# Patient Record
Sex: MALE | Race: WHITE | NOT HISPANIC OR LATINO | Employment: OTHER | URBAN - METROPOLITAN AREA
[De-identification: names, ages, dates, MRNs, and addresses within clinical notes are randomized per-mention and may not be internally consistent; named-entity substitution may affect disease eponyms.]

---

## 2017-12-29 ENCOUNTER — TELEPHONE (OUTPATIENT)
Dept: CARDIOLOGY | Facility: MEDICAL CENTER | Age: 72
End: 2017-12-29

## 2017-12-29 NOTE — TELEPHONE ENCOUNTER
----- Message from Dasia Quiroga sent at 12/29/2017 10:16 AM PST -----  Regarding: concerns about recent testing he had  LA/Traci      Patient's wife Patti called and patient had recent testing for another doctor and calcified arteries was mentioned. Doctor told patient it could of been there from years ago prior to him having stents put in, but suggested he get in touch with Dr. Waterman. Patti can be reached at [phone].    ========================================================================    S/w pt's wife (Patti), per Patti pt was just in his doctor today and they have the print out of the PET scan of the body that pt had done recently because of enlarged lymph node and it says on the heart section that pt have severe coronary artery calcification so that concerns them, extensive education provided to wife regarding coronary calcification and heart attack, wife reports pt denies any CP, increasing SOB or any cardiac symptoms at this time, informed wife that since pt has not seen in our office for more than a year, it would be a good idea to set up a F[facility] w/ Dr Waterman, wife agreed, discussed ED precautions w/ wife also, wife verbalizes understanding, wife transferred to scheduling to help set up FV.     FYI to Dr Waterman

## 2017-12-29 NOTE — TELEPHONE ENCOUNTER
Abida Waterman M.D.  Traci Clements R.N.   Caller: Unspecified (Today, 10:57 AM)             Thank you!   Very very common to have Ca++ in your seventies - no reccs unless he has sxs, like you said

## 2018-03-02 ENCOUNTER — TELEPHONE (OUTPATIENT)
Dept: CARDIOLOGY | Facility: MEDICAL CENTER | Age: 73
End: 2018-03-02

## 2018-03-02 ENCOUNTER — OFFICE VISIT (OUTPATIENT)
Dept: CARDIOLOGY | Facility: MEDICAL CENTER | Age: 73
End: 2018-03-02
Payer: MEDICARE

## 2018-03-02 VITALS
OXYGEN SATURATION: 95 % | BODY MASS INDEX: 34.36 KG/M2 | HEIGHT: 69 IN | HEART RATE: 100 BPM | SYSTOLIC BLOOD PRESSURE: 120 MMHG | DIASTOLIC BLOOD PRESSURE: 80 MMHG | WEIGHT: 232 LBS

## 2018-03-02 DIAGNOSIS — Z95.5 H/O RIGHT CORONARY ARTERY STENT PLACEMENT: Chronic | ICD-10-CM

## 2018-03-02 DIAGNOSIS — I25.10 CORONARY ARTERY DISEASE DUE TO LIPID RICH PLAQUE: ICD-10-CM

## 2018-03-02 DIAGNOSIS — I25.83 CORONARY ARTERY DISEASE DUE TO LIPID RICH PLAQUE: ICD-10-CM

## 2018-03-02 DIAGNOSIS — Z78.9 STATIN INTOLERANCE: ICD-10-CM

## 2018-03-02 DIAGNOSIS — I70.213 ATHEROSCLEROSIS OF NATIVE ARTERY OF BOTH LOWER EXTREMITIES WITH INTERMITTENT CLAUDICATION (HCC): Primary | ICD-10-CM

## 2018-03-02 PROCEDURE — 99214 OFFICE O/P EST MOD 30 MIN: CPT | Performed by: INTERNAL MEDICINE

## 2018-03-02 RX ORDER — HYDROCODONE BITARTRATE AND ACETAMINOPHEN 7.5; 325 MG/1; MG/1
1-2 TABLET ORAL EVERY 6 HOURS PRN
COMMUNITY
End: 2019-01-25

## 2018-03-02 ASSESSMENT — ENCOUNTER SYMPTOMS
PHOTOPHOBIA: 0
EYE PAIN: 0
COUGH: 0
DOUBLE VISION: 0
INSOMNIA: 0
HEARTBURN: 0
BRUISES/BLEEDS EASILY: 0
PALPITATIONS: 0
DIZZINESS: 0
BLURRED VISION: 0
ABDOMINAL PAIN: 0
LOSS OF CONSCIOUSNESS: 0
MYALGIAS: 0
WEIGHT LOSS: 0
SHORTNESS OF BREATH: 0
EYE DISCHARGE: 0

## 2018-03-02 NOTE — TELEPHONE ENCOUNTER
----- Message from Abida Waterman M.D. sent at 3/2/2018 10:48 AM PST -----  Can you please refer him back to the lipid clinic?  Diagnosis coronary disease with statin intolerance    ========================================================    Referral to Lipid Clinic initiated.

## 2018-03-02 NOTE — PROGRESS NOTES
"Subjective:   Brijesh Brown is a 72y.o. male who presents today in follow-up in regards to his coronary disease hypertension and hyperlipidemia had stenting to his mid LAD in 2004 for symptoms as well as subsequent stenting to the diagonal and right coronary artery more than 10 years ago    Doing well  Had a CAT scan of his chest to rule out cancer, wife was worried that he had calcium in his coronary arteries no symptoms, now on insulin for his diabetes but is able to walk without chest discomfort or breathlessness    Often does 2 miles a day but oftentimes, maybe once a week or so feels cramping in his thighs. He never took his statin. He has had trouble with more than 6 statins. We referred him to lipid clinic but he never followed up with fear that the medicines would be too expensive    Doing well no setbacks and enjoying his life      Past Medical History:   Diagnosis Date   • Atypical chest pain 4/23/2012   • Benign essential HTN 4/23/2012   • Depression 4/23/2012   • H/O right coronary artery stent placement 4/23/2012   • Myalgia 4/23/2012   • Other and unspecified hyperlipidemia 12/20/2013   • Overweight(278.02) 4/23/2012   • PVD (peripheral vascular disease) (CMS-[facility]) 4/23/2012     No past surgical history on file.  Family History   Problem Relation Age of Onset   • Lung Disease Mother    • Heart Disease Father      \"bad valve\"   • Diabetes Sister    • Heart Disease Sister      History   Smoking Status   • Former Smoker   • Packs/day: 1.00   • Years: 20.00   • Types: Cigarettes   • Quit date: 1/1/1984   Smokeless Tobacco   • Never Used     Comment: quit '84, 20 py history     Allergies   Allergen Reactions   • Statins [Hmg-Coa-R Inhibitors]      Caused muscle pain     Outpatient Encounter Prescriptions as of 3/2/2018   Medication Sig Dispense Refill   • Insulin Degludec (TRESIBA FLEXTOUCH SC) Inject  as instructed.     • HYDROcodone-acetaminophen (NORCO) 7.5-325 MG per tablet Take 1-2 Tabs by mouth " "every 6 hours as needed.     • glimepiride (AMARYL) 4 MG Tab Take 4 mg by mouth 2 times a day.     • metformin ER (GLUCOPHAGE XR) 500 MG TB24 Take 2 Tabs by mouth 2 times a day. 360 Tab 3   • Pitavastatin Calcium (LIVALO) 1 MG Tab Take 1 mg by mouth every day. (Patient not taking: Reported on 3/2/2018) 30 Tab 3   • vitamin D, Ergocalciferol, (DRISDOL) 24665 UNITS CAPS capsule Take 1 Cap by mouth every 7 days. (Patient not taking: Reported on 3/2/2018) 13 Cap 3   • aspirin EC (ECOTRIN) 81 MG TBEC Take 81 mg by mouth every day.     • Cholecalciferol (VITAMIN D) 2000 UNITS CAPS Take 2,000 Units by mouth every day.       No facility-administered encounter medications on file as of 3/2/2018.      Review of Systems   Constitutional: Negative for malaise/fatigue and weight loss.   Eyes: Negative for blurred vision, double vision, photophobia, pain and discharge.   Respiratory: Negative for cough and shortness of breath.    Cardiovascular: Negative for chest pain, palpitations and leg swelling.   Gastrointestinal: Negative for abdominal pain and heartburn.   Musculoskeletal: Negative for joint pain and myalgias.   Neurological: Negative for dizziness and loss of consciousness.   Endo/Heme/Allergies: Does not bruise/bleed easily.   Psychiatric/Behavioral: The patient does not have insomnia.    All other systems reviewed and are negative.       Objective:   /80   Pulse 100   Ht 1.74 m (5' 8.5\")   Wt 105.2 kg (232 lb)   SpO2 95%   BMI 34.76 kg/m²     Physical Exam   Constitutional: He is oriented to person, place, and time. He appears well-developed.   Overweight   HENT:   Head: Normocephalic and atraumatic.   Mouth/Throat: Mucous membranes are normal.   Eyes: EOM are normal. Pupils are equal, round, and reactive to light. No scleral icterus.   He has a visual field cut in his right eye from the midline down, it is black when he closes his left eye   Neck: No JVD present. No thyroid mass and no thyromegaly present. "   Cardiovascular: Normal rate, regular rhythm and intact distal pulses.    No murmur heard.  Normal posterior tibial pulses bilaterally   Pulmonary/Chest: Effort normal and breath sounds normal. He exhibits no tenderness.   Musculoskeletal: Normal range of motion. He exhibits no edema or tenderness.   Neurological: He is alert and oriented to person, place, and time. He has normal strength. He displays no tremor.   Skin: Skin is warm. No rash noted.   Psychiatric: He has a normal mood and affect. His behavior is normal.   Vitals reviewed.      Assessment:     1. Atherosclerosis of native artery of both lower extremities with intermittent claudication (CMS-[facility])  US-KWADWO MULTI LEVEL BILAT   2. Coronary artery disease due to lipid rich plaque     3. 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   2016  Again this strongly warrants statin or some other cholesterol reducing medication with his known coronary disease and significant calcification. I will refer him back to the cholesterol clinic. He is quite interested in doing this    Coronary disease  Talked about coronary calcium  Functional, plan PET scan to rule out ischemia if he has symptoms with exertion  Aspirin  Statin as above  Weight loss and diabetic control discussed at length    Cramping in the legs  Atypical for claudication  Exam reassuring, will check ABIs are discussed    RTC one year sooner based on the above. Laboratory data upcoming with his primary doctor

## 2018-03-12 ENCOUNTER — HOSPITAL ENCOUNTER (OUTPATIENT)
Dept: RADIOLOGY | Facility: MEDICAL CENTER | Age: 73
End: 2018-03-12
Attending: INTERNAL MEDICINE
Payer: MEDICARE

## 2018-03-12 DIAGNOSIS — I70.213 ATHEROSCLEROSIS OF NATIVE ARTERY OF BOTH LOWER EXTREMITIES WITH INTERMITTENT CLAUDICATION (HCC): ICD-10-CM

## 2018-03-12 PROCEDURE — 93922 UPR/L XTREMITY ART 2 LEVELS: CPT

## 2018-03-12 PROCEDURE — 93925 LOWER EXTREMITY STUDY: CPT

## 2018-03-13 ENCOUNTER — TELEPHONE (OUTPATIENT)
Dept: CARDIOLOGY | Facility: MEDICAL CENTER | Age: 73
End: 2018-03-13

## 2018-03-13 DIAGNOSIS — I73.9 PAD (PERIPHERAL ARTERY DISEASE) (HCC): ICD-10-CM

## 2018-03-13 NOTE — TELEPHONE ENCOUNTER
----- Message from Dasia Quiroga sent at 3/13/2018  2:06 PM PDT -----  Regarding: calling for test results  LA/Traci      Patient's wife Patti is calling for test results for lower extremity tests he had done yesterday, 03/12. She can be reached at [phone].      ----- Message -----   From: Abida Waterman M.D.   Sent: 3/12/2018   4:11 PM   To: Viviana Villarreal R.N.     Looks like he does have some significant PAD. Nothing to be immediately concerned or worried about. If his legs are cramping and bothering him, the right person for him to see would be a vascular surgeon, would recommend Dr. Arias's group, we be happy to set up      ==============================================================================    Called pt and wife (Patti), discussed LE US result and Dr Waterman recommendations, pt reports that he gets leg cramps cesilia in morning and his legs aches at times, pt and wife agreed to Vascular Referral.     [facility] initiated.

## 2018-03-23 ENCOUNTER — NON-PROVIDER VISIT (OUTPATIENT)
Dept: VASCULAR LAB | Facility: MEDICAL CENTER | Age: 73
End: 2018-03-23
Attending: INTERNAL MEDICINE
Payer: MEDICARE

## 2018-03-23 DIAGNOSIS — E78.5 HYPERLIPIDEMIA, UNSPECIFIED HYPERLIPIDEMIA TYPE: ICD-10-CM

## 2018-03-23 PROCEDURE — 99212 OFFICE O/P EST SF 10 MIN: CPT

## 2018-03-23 RX ORDER — TEMAZEPAM 15 MG/1
CAPSULE ORAL
Refills: 1 | COMMUNITY
Start: 2018-03-05 | End: 2019-01-25

## 2018-03-23 RX ORDER — INSULIN DEGLUDEC 200 U/ML
INJECTION, SOLUTION SUBCUTANEOUS
Refills: 0 | COMMUNITY
Start: 2018-03-05 | End: 2019-01-25

## 2018-03-23 RX ORDER — HYDROCODONE BITARTRATE AND ACETAMINOPHEN 10; 325 MG/1; MG/1
TABLET ORAL
Refills: 0 | COMMUNITY
Start: 2018-01-15 | End: 2019-01-25

## 2018-03-23 RX ORDER — IBUPROFEN 800 MG/1
TABLET ORAL
Refills: 0 | COMMUNITY
Start: 2018-01-15 | End: 2019-01-25

## 2018-03-23 NOTE — PROGRESS NOTES
Date of Service: 03/23/18    [facility] Brown has been referred for evaluation and management of dyslipidemia    Referral Source: Abida Waterman    [facility]  History of ASCVD: Yes, Details: Hx of Lower Extremity arterial disease PVD, Hx of CAD with right coronary stent placement.  Age at Initial Diagnosis:  58    Current Prescription Lipid Lowering Medications - including dose:   Statin: None  Non-Statin: None  Current Lipid Lowering and Related Supplements:   None  Any Current Side Effects Potentially Related to Lipid Lowering therapy?   No  Current Adherence to Lipid Lowering Therapies   Not on any lipid lowering therapy  Previously Attempted Interventions for Lipids - including outcome  Statin: Was on atorvastatin 40 mg for 12 years without complications then developed muscle pain/ weakness, patient also failed rosuvastatin and pitavasatin  Outcome: Atorvastatin was then stopped, he has since tried several different statins since that point  Non-Statin: Patient has had a Hx of trial of Zetia 10 mg and Welchol 2500 mg/day  Outcome: he did no tolerate those either  Any Previous History of Statin Intolerance?   Yes, Details: See above, Hx of several different trials including Lipitor, Crestor and pitavastatin  Baseline Lipids Prior to Treatment:   Shown here:Results for [facility] BROWN (MRN [id number]) as of 3/23/2018 10:56   Ref. Range 6/26/2015 08:54 10/14/2015 11:21   Cholesterol,Tot Latest Ref Range: 100 - 199 mg/dL 257 (H)    Triglycerides Latest Ref Range: 0 - 149 mg/dL 163 (H)    HDL Latest Ref Range: >39 mg/dL 42    LDL Latest Ref Range: 0 - 99 mg/dL 182 (H)    VLDL Cholesterol Calc Latest Ref Range: 5 - 40 mg/dL 33      Other Pertinent History: Patient with Hx of Type II Diabetes, HTN, Hx of cigarette smoking quit in 1984  PAST MEDICAL HISTORY:  has a past medical history of Atypical chest pain (4/23/2012); Benign essential HTN (4/23/2012); Depression (4/23/2012); H/O right coronary artery stent placement  (2012); Myalgia (2012); Other and unspecified hyperlipidemia (2013); Overweight(278.02) (2012); and PVD (peripheral vascular disease) (CMS-HCC) (2012).  PAST SURGICAL HISTORY:  has no past surgical history on file.  CURRENT MEDICATIONS:   Current Outpatient Prescriptions:   •  ONE TOUCH ULTRA TEST, USE TO TEST BLOOD SUGAR ONCE DAILY AS DIRECTED  •  HYDROcodone/acetaminophen, TK 1 T PO Q 4 TO 6 H PRN P  •  ibuprofen, TK 1 T PO Q 6 TO 8 H PRN P  •  TRESIBA FLEXTOUCH, INJECT 22 UNITS UNDER THE SKIN NIGHTLY  •  temazepam, TK 2 CS PO QHS PRF INSOMNIA  •  Insulin Degludec (TRESIBA FLEXTOUCH SC), Inject  as instructed., 3/2/2018  •  HYDROcodone-acetaminophen, 1-2 Tab, Oral, Q6HRS PRN, 3/2/2018  •  glimepiride, 4 mg, Oral, BID, 3/2/2018  •  metFORMIN ER, 1,000 mg, Oral, BID, 3/2/2018  •  vitamin D (Ergocalciferol), 50,000 Units, Oral, Q7 DAYS (Patient not taking: Reported on 3/2/2018), Not Taking at Unknown time  •  aspirin EC, 81 mg, Oral, DAILY  •  Vitamin D, 2,000 Units, Oral, DAILY  ALLERGIES: Statins [hmg-coa-r inhibitors]    SOCIAL HISTORY   History   Smoking Status   • Former Smoker   • Packs/day: 1.00   • Years: 20.00   • Types: Cigarettes   • Quit date: 1984   Smokeless Tobacco   • Never Used     Comment: quit '84, 20 py history     Change in weight: Stable  Exercise habits: no regular exercise program  Diet: common adult    ROS  Physical Exam    DATA REVIEW:  Most Recent Lipid Panel: 2015   TC: 257 T, LDL: 182, VLDL:33, HDL:42 ,  (Non HDL): 215    Other: NA    Recent Imaging Studies:    Recent imaging studies, including 2018 Right KWADWO 0.73, Left KWADWO 0.65, were available in EMR and reviewed with patient at today's visit. Bilateral. Moderate arterial insufficiency    ASSESSMENT AND PLAN  Patient Type, check all that apply:   Secondary Prevention  Established Atherosclerotic Cardiovascular Disease (ASCVD)  Yes, Details: Hx of CAD with right coronary stent placement,  PVD  Other Established (non-atherosclerotic) Vascular Disease, if Present:  None  Evidence of Heterozygous Familial Hypercholesterolemia (FH):   Possible  ACC/AHA Indication for Statin Therapy, michele all that apply:  Established ASCVD: Indication for High intensity statin   Calculated Risk for ASCVD, if applicable    N/A  Other Significant Risk Markers, if any, michele all that apply   Family history of premature ASCVD in first degree relative Father had a CAD with triple bypass at 54 y/o  National Lipid Association (NLA) Goal  LDL-C:   <70 mg/dL, Non HDL goal <100  Lifestyle Recommendations From Today’s Visit:    Eating Plan: Concentrate on  Low sat/Trans fat  Statin Therapy Recommendations from Today’s Visit:   Patient resistant to re-trial of statin at this point  Non-Statin Medications Recommendations from Today’s Visit:   None  Indication for PCSK9 Inhibitor, if applicable: YES  ASCVD with suboptimal control of LDL-C despite maximally tolerated statin.   Patient with Hx of ASCVD and Type II DM, per NLA guidelines he is very high risk, with the need of >20% reduction in LDL and Non HDL goals.  Supplements Recommended at this visit: Ordered Vit D levels will evaluate at that point   Recommendations for Other Cardiovascular Risk Factors, michele all that apply: Diabetes/Impaired Fasting Glucose- Patient is being managed by PCP last A1C ws 7.6 10/24/2015    Studies Ordered at Todays Visit: None  Blood Work Ordered At Today’s visit: Lipid panel and Vit D   Follow-Up: 13 days, patient will get new lipid panel ASAP and will return for instruction on PCSK9-I initiation.    Shakir George, PharmD     Agree with above  Patient fits ACC decision pathway and our clinical utilization criteria for use of PSCK9 Inhibitor    Michael J Bloch, MD  Certified Clinical Lipid Specialist  Medial Director, [facility]    CC:  Sheridan Jackman M.D.

## 2018-03-30 LAB
25(OH)D3+25(OH)D2 SERPL-MCNC: 24 NG/ML (ref 30–100)
ABN OPTION 3 977711: NORMAL
CHOLEST SERPL-MCNC: 219 MG/DL (ref 100–199)
HDLC SERPL-MCNC: 38 MG/DL
LABORATORY COMMENT REPORT: ABNORMAL
LDLC SERPL CALC-MCNC: 160 MG/DL (ref 0–99)
TRIGL SERPL-MCNC: 105 MG/DL (ref 0–149)
VLDLC SERPL CALC-MCNC: 21 MG/DL (ref 5–40)

## 2018-04-03 ENCOUNTER — HOSPITAL ENCOUNTER (OUTPATIENT)
Facility: MEDICAL CENTER | Age: 73
End: 2018-04-03
Attending: SURGERY | Admitting: SURGERY
Payer: MEDICARE

## 2018-04-06 ENCOUNTER — NON-PROVIDER VISIT (OUTPATIENT)
Dept: VASCULAR LAB | Facility: MEDICAL CENTER | Age: 73
End: 2018-04-06
Attending: INTERNAL MEDICINE
Payer: MEDICARE

## 2018-04-06 ENCOUNTER — TELEPHONE (OUTPATIENT)
Dept: VASCULAR LAB | Facility: MEDICAL CENTER | Age: 73
End: 2018-04-06

## 2018-04-06 DIAGNOSIS — E78.5 HYPERLIPIDEMIA, UNSPECIFIED HYPERLIPIDEMIA TYPE: ICD-10-CM

## 2018-04-06 PROCEDURE — 96372 THER/PROPH/DIAG INJ SC/IM: CPT

## 2018-04-06 NOTE — NON-PROVIDER
Patient came in today for Praluent training. Patient understands how the injection works. The first injection was given here in our office today, patient was comfortable enough to do it himself. Pharmacist also came in to give the patient his lab values. Updated patient on how the insurance works with this medication. Patient states that he is very concerned about the price. Let the patient know that once he has been approved, Praluent has a co-pay assistance program that he may qualify for. Once we get an approval from the insurance company we will try and qualify the patient for PASS. PA for the medication was just received this morning, I will call today and get the PA done.     Maria Guadalupe Velázquez, Med Ass't

## 2018-04-06 NOTE — TELEPHONE ENCOUNTER
Patients most recent Lipid panel reviewed, patient came in today for first administration of Praluent 150 mg. By the end of visit he was patient was comfortable enough to do it himself. 4  weeks of samples were given to patient today. [facility] 150 mg.     FU with pharmacotherapy clinic and  Lipid Panel in 6 weeks.    Shakir George, Pharm.D  Cc Dr Bloch

## 2018-04-10 ENCOUNTER — TELEPHONE (OUTPATIENT)
Dept: VASCULAR LAB | Facility: MEDICAL CENTER | Age: 73
End: 2018-04-10

## 2018-04-10 ENCOUNTER — TELEPHONE (OUTPATIENT)
Dept: CARDIOLOGY | Facility: MEDICAL CENTER | Age: 73
End: 2018-04-10

## 2018-04-10 ENCOUNTER — APPOINTMENT (OUTPATIENT)
Dept: ADMISSIONS | Facility: MEDICAL CENTER | Age: 73
End: 2018-04-10
Payer: MEDICARE

## 2018-04-10 DIAGNOSIS — I73.9 PAD (PERIPHERAL ARTERY DISEASE) (HCC): ICD-10-CM

## 2018-04-10 NOTE — TELEPHONE ENCOUNTER
----- Message from Ginny Cano sent at 4/10/2018 12:54 PM PDT -----  Regarding: Question about prescribing medication  LA/Traci    Patient's wife, Shelia, wants a call back at [phone]. She wants to find out if Dr Waterman would prescribe a medication called Cilostazol (Pletal) for her .    ================================================================    Called pt, pt reports he saw Dr Rodriguez for his PAD and was given two options; first is he can either loose weight and increase his activity as tolerated or he can have angioplasty done, per pt he opted on angioplasty first but then decided on the first one afterwards, then he read something about Pletal for PVD online, informed pt that he actually needs to call Dr Rodriguez office if this is something he would recommend for treatment of his PVD/PAD as he is the vascular specialist, pt verbalizes understanding, pt and wife would like to know though Dr Waterman recommendations if he is safe to take this medication considering his heart disease and other medications then they would call Dr Rodriguez to discussed.     To Dr Waterman for advice

## 2018-04-10 NOTE — TELEPHONE ENCOUNTER
Spoke with pt on the phone. He rc'd 1st praluent injections 4/6/18. He reports that evening he experienced SOB, chest pressure and numbness/lightheadedness in his face. He reports this for 2 more days (3 days total). He reports it has resolved at this point. Pt does have a hx of CAD. Discussed with pt not a common side effect of Praluent although he reports he read on the internet it is. Discussed with pt, concern of symptoms and possible coronary event. Recommended pt be evaluated in ER which he is refusing. He denies rash, tongue swelling, injection site reaction or any other allergic reaction symptoms.  He reports he will continue with Praluent q 2 weeks. Discussed with pt if symptoms return to seek immediate medical attention      Patsy Mcdonough, Pharm D

## 2018-04-11 RX ORDER — CILOSTAZOL 100 MG/1
100 TABLET ORAL EVERY 12 HOURS
Qty: 60 TAB | Refills: 6 | OUTPATIENT
Start: 2018-04-11 | End: 2019-01-25

## 2018-04-12 ENCOUNTER — TELEPHONE (OUTPATIENT)
Dept: CARDIOLOGY | Facility: MEDICAL CENTER | Age: 73
End: 2018-04-12

## 2018-04-12 ENCOUNTER — TELEPHONE (OUTPATIENT)
Dept: VASCULAR LAB | Facility: MEDICAL CENTER | Age: 73
End: 2018-04-12

## 2018-04-12 DIAGNOSIS — E78.5 HYPERLIPIDEMIA, UNSPECIFIED HYPERLIPIDEMIA TYPE: ICD-10-CM

## 2018-04-12 NOTE — TELEPHONE ENCOUNTER
Pt's wife called and is concerned re: new Rx for Pletal. She is wondering if he is to continue taking ASA 81 mg as well and worried about interactions. Pt is also doing Praluent injections and she is concerned about interactions with that as well. Discussed that Pharmacist is a good resource if concerned with drug interactions, but she would like Dr. Waterman's advice.    To Dr. Julienne Waterman to advise.

## 2018-04-13 NOTE — TELEPHONE ENCOUNTER
Called pt. S/w wife and pt, discussed Dr Waterman recommendations, pt and wife verbalizes understanding

## 2018-04-23 ENCOUNTER — TELEPHONE (OUTPATIENT)
Dept: VASCULAR LAB | Facility: MEDICAL CENTER | Age: 73
End: 2018-04-23

## 2018-04-23 NOTE — TELEPHONE ENCOUNTER
Received indication from silver script that praluent was approved.  MA to inform patient.    Michael J Bloch, MD  Certified Clinical Lipid Specialist  Medial Director, [facility]    Cc:

## 2018-04-24 ENCOUNTER — TELEPHONE (OUTPATIENT)
Dept: VASCULAR LAB | Facility: MEDICAL CENTER | Age: 73
End: 2018-04-24

## 2018-04-24 NOTE — TELEPHONE ENCOUNTER
Spoke with pt on the phone following up to see if he had side effects after second injection of Praluent. Pt reports similar adverse reaction of SOB and chest pressure for 2.5 days. Pt is reporting Praluent is $150 per month and he can not afford this. Pt wants to try Repatha as he reports he read it has less side effects... Pt is hoping to get on patient assistance program. Will get MA to contact pt to see if he qualifies for patient assistance for Repatha.     Patsy Mcdonough, Pharm D

## 2018-05-02 ENCOUNTER — ANTICOAGULATION MONITORING (OUTPATIENT)
Dept: VASCULAR LAB | Facility: MEDICAL CENTER | Age: 73
End: 2018-05-02

## 2018-05-02 ENCOUNTER — TELEPHONE (OUTPATIENT)
Dept: VASCULAR LAB | Facility: MEDICAL CENTER | Age: 73
End: 2018-05-02

## 2018-05-02 NOTE — TELEPHONE ENCOUNTER
PA forms completed for silver prescription and [facility] for repatha  Await coverage determination    Michael J Bloch, MD  Certified Clinical Lipid Specialist  Medial Director, [facility]

## 2018-05-02 NOTE — PROGRESS NOTES
Patient came in to receive samples of Repatha 140 mg.     LOT:[id number]  EXP: 6/20    Spoke with [facility] this morning, they will be faxing over the PA. Will make sure this is done in a timely manner and will keep the patient informed.     Maria Guadalupe Velázquez, Med Ass't  [facility]

## 2018-05-07 ENCOUNTER — TELEPHONE (OUTPATIENT)
Dept: VASCULAR LAB | Facility: MEDICAL CENTER | Age: 73
End: 2018-05-07

## 2018-05-07 NOTE — TELEPHONE ENCOUNTER
Received indication from formulary that repatha prior authorization was approved  Medical assistant to inform patient    Michael J Bloch, MD  Certified Clinical Lipid Specialist  Medial Director, [facility]

## 2018-05-14 ENCOUNTER — TELEPHONE (OUTPATIENT)
Dept: VASCULAR LAB | Facility: MEDICAL CENTER | Age: 73
End: 2018-05-14

## 2018-05-14 ENCOUNTER — ANTICOAGULATION MONITORING (OUTPATIENT)
Dept: VASCULAR LAB | Facility: MEDICAL CENTER | Age: 73
End: 2018-05-14

## 2018-05-14 NOTE — PROGRESS NOTES
Patient came in to fill out [employer] [facility] paperwork. Patient also received Repatha 140 mg sample.     LOT: [id number]  EXP: 6/20

## 2018-05-18 ENCOUNTER — APPOINTMENT (OUTPATIENT)
Dept: VASCULAR LAB | Facility: MEDICAL CENTER | Age: 73
End: 2018-05-18
Payer: MEDICARE

## 2018-05-24 ENCOUNTER — TELEPHONE (OUTPATIENT)
Dept: VASCULAR LAB | Facility: MEDICAL CENTER | Age: 73
End: 2018-05-24

## 2018-05-24 NOTE — TELEPHONE ENCOUNTER
Received indication from [employer] that repatha will be covered on safety [email or web address] [facility] to inform patient    Michael J Bloch, MD  Certified Clinical Lipid Specialist  Medial Director, [facility]

## 2018-06-21 ENCOUNTER — TELEPHONE (OUTPATIENT)
Dept: CARDIOLOGY | Facility: MEDICAL CENTER | Age: 73
End: 2018-06-21

## 2018-06-21 NOTE — TELEPHONE ENCOUNTER
----- Message from Ginny Cano sent at 6/21/2018  2:39 PM PDT -----  Regarding: Patient's wife wants call back  LA/Traci    Patient's wife, Patti, wants a call back at [phone]. She said that the call isn't an emergency but needs to speak to you about her . I tried to find out what the call is about and she said that she needs to speak to you.    ======================================================================    Called pt's wife (Patti), per wife last month pt had an episode that he was dizzy and felt some numbness on his face and almost faint, denies CP or SOB and denies further episodes since then, they did not check pt's BP and blood sugar during the episode. Advise wife to continue to monitor at this time and if pt have further episodes or increase episode to call our office or seek emergency services, wife verbalizes understanding    Pt's wife also would like me to advice her about her Aortic Regurgitation and whether she needs a prophylaxis abx, she said she currently don't have a cardiologist and sees Linda before, informed wife that we are unable to give recommendations about that at this time and recommended to contact her PCP if she does not have a cardiologist.     To Dr Waterman

## 2018-06-21 NOTE — TELEPHONE ENCOUNTER
Chart reviewed, agree with the patient's current treatment, recommend nothing new  Anyone in our group can see the patient's wife if needed.  Thanks 1 [email or web address]

## 2018-12-19 ENCOUNTER — TELEPHONE (OUTPATIENT)
Dept: VASCULAR LAB | Facility: MEDICAL CENTER | Age: 73
End: 2018-12-19

## 2018-12-19 NOTE — TELEPHONE ENCOUNTER
[facility]    LM for pt to call clinic and establish care again with lipid clinic.    Yoshi Strange, PharmD

## 2019-01-02 ENCOUNTER — TELEPHONE (OUTPATIENT)
Dept: VASCULAR LAB | Facility: MEDICAL CENTER | Age: 74
End: 2019-01-02

## 2019-01-02 NOTE — TELEPHONE ENCOUNTER
[facility]    Left VM to remind pt to schedule follow up with lipid clinic.    Yoshi Strange, PharmD

## 2019-01-10 ENCOUNTER — TELEPHONE (OUTPATIENT)
Dept: VASCULAR LAB | Facility: MEDICAL CENTER | Age: 74
End: 2019-01-10

## 2019-01-25 ENCOUNTER — OFFICE VISIT (OUTPATIENT)
Dept: CARDIOLOGY | Facility: MEDICAL CENTER | Age: 74
End: 2019-01-25
Payer: MEDICARE

## 2019-01-25 VITALS
WEIGHT: 232 LBS | HEART RATE: 100 BPM | BODY MASS INDEX: 34.36 KG/M2 | HEIGHT: 69 IN | SYSTOLIC BLOOD PRESSURE: 130 MMHG | OXYGEN SATURATION: 92 % | DIASTOLIC BLOOD PRESSURE: 84 MMHG

## 2019-01-25 DIAGNOSIS — E66.3 OVERWEIGHT: Chronic | ICD-10-CM

## 2019-01-25 DIAGNOSIS — G47.33 OBSTRUCTIVE SLEEP APNEA: ICD-10-CM

## 2019-01-25 DIAGNOSIS — I70.213 ATHEROSCLEROSIS OF NATIVE ARTERY OF BOTH LOWER EXTREMITIES WITH INTERMITTENT CLAUDICATION (HCC): ICD-10-CM

## 2019-01-25 DIAGNOSIS — Z78.9 STATIN INTOLERANCE: ICD-10-CM

## 2019-01-25 DIAGNOSIS — I25.10 CORONARY ARTERY DISEASE DUE TO LIPID RICH PLAQUE: ICD-10-CM

## 2019-01-25 DIAGNOSIS — I25.83 CORONARY ARTERY DISEASE DUE TO LIPID RICH PLAQUE: ICD-10-CM

## 2019-01-25 DIAGNOSIS — I73.9 PERIPHERAL ARTERIAL DISEASE (HCC): ICD-10-CM

## 2019-01-25 DIAGNOSIS — I10 BENIGN ESSENTIAL HTN: Chronic | ICD-10-CM

## 2019-01-25 DIAGNOSIS — Z87.891 PERSONAL HISTORY OF TOBACCO USE: ICD-10-CM

## 2019-01-25 DIAGNOSIS — E11.65 UNCONTROLLED TYPE 2 DIABETES MELLITUS WITH HYPERGLYCEMIA (HCC): ICD-10-CM

## 2019-01-25 DIAGNOSIS — E78.2 MIXED HYPERLIPIDEMIA: ICD-10-CM

## 2019-01-25 DIAGNOSIS — Z95.5 H/O RIGHT CORONARY ARTERY STENT PLACEMENT: Chronic | ICD-10-CM

## 2019-01-25 PROBLEM — I70.219 ATHEROSCLEROSIS OF NATIVE ARTERIES OF EXTREMITY WITH INTERMITTENT CLAUDICATION (HCC): Status: ACTIVE | Noted: 2019-01-25

## 2019-01-25 PROCEDURE — 99214 OFFICE O/P EST MOD 30 MIN: CPT | Performed by: NURSE PRACTITIONER

## 2019-01-25 RX ORDER — ZOLPIDEM TARTRATE 5 MG/1
TABLET ORAL
Refills: 3 | COMMUNITY
Start: 2019-01-20 | End: 2019-02-08 | Stop reason: CLARIF

## 2019-01-25 RX ORDER — PEN NEEDLE, DIABETIC 32GX 5/32"
NEEDLE, DISPOSABLE MISCELLANEOUS
Refills: 6 | COMMUNITY
Start: 2018-12-05 | End: 2019-02-08 | Stop reason: CLARIF

## 2019-01-25 RX ORDER — METFORMIN HYDROCHLORIDE 500 MG/1
TABLET, EXTENDED RELEASE ORAL
Refills: 0 | COMMUNITY
Start: 2018-11-28 | End: 2019-02-08 | Stop reason: CLARIF

## 2019-01-25 RX ORDER — HYDROCODONE BITARTRATE AND ACETAMINOPHEN 5; 325 MG/1; MG/1
TABLET ORAL
Refills: 0 | COMMUNITY
Start: 2019-01-21 | End: 2019-01-25

## 2019-01-25 ASSESSMENT — ENCOUNTER SYMPTOMS
MYALGIAS: 0
ABDOMINAL PAIN: 0
COUGH: 0
DIZZINESS: 0
ORTHOPNEA: 0
SHORTNESS OF BREATH: 0
PND: 0
CLAUDICATION: 0
PALPITATIONS: 0
FEVER: 0

## 2019-01-25 NOTE — PROGRESS NOTES
"Chief Complaint   Patient presents with   • Coronary Artery Disease     PP of [place]     Subjective:   Brijesh Brown is a 73 y.o. male who presents today for worsening exertional angina.    He is a patient of Dr. ALTAGRACIA Waterman in our office.    Hx of CAD with prior coronary stent placements X3 (unkonwn vessels), HLD, HTN, obesity, CHERIE (un-treated), and PVD.    He states that over the last year but mainly the last 5 months he has noticed exertional chest tightness, once he rests it goes away.    His wife is in the apt with him as well    He doesn't exercise and tries to eat healthy but remains overweight.    He has no claudication with his PVD.    He stopped taking ASA, repatha, and pletal due to side effects.    He has had no episodes of palpitations, dizziness/lightheadedness, shortness of breath, orthopnea, or peripheral edema.    Past Medical History:   Diagnosis Date   • Atypical chest pain 4/23/2012   • Benign essential HTN 4/23/2012   • Depression 4/23/2012   • H/O right coronary artery stent placement 4/23/2012   • Myalgia 4/23/2012   • Other and unspecified hyperlipidemia 12/20/2013   • Overweight(278.02) 4/23/2012   • PVD (peripheral vascular disease) ([facility]) 4/23/2012     No past surgical history on file.  Family History   Problem Relation Age of Onset   • Lung Disease Mother    • Heart Disease Father         \"bad valve\"   • Diabetes Sister    • Heart Disease Sister      Social History     Social History   • Marital status:      Spouse name: N/A   • Number of children: N/A   • Years of education: N/A     Occupational History   • Not on file.     Social History Main Topics   • Smoking status: Former Smoker     Packs/day: 1.00     Years: 20.00     Types: Cigarettes     Quit date: 1/1/1984   • Smokeless tobacco: Never Used      Comment: quit '84, 20 py history   • Alcohol use No   • Drug use: Unknown   • Sexual activity: Not on file      Comment:  48 years     Other Topics Concern   • Not on file "     Social History Narrative   • No narrative on file     Allergies   Allergen Reactions   • Statins [Hmg-Coa-R Inhibitors]      Caused muscle pain     Outpatient Encounter Prescriptions as of 1/25/2019   Medication Sig Dispense Refill   • metFORMIN ER (GLUCOPHAGE XR) 500 MG TABLET SR 24 HR TK 2 TS PO BID  0   • zolpidem (AMBIEN) 5 MG Tab TK 1 T PO QHS FOR 30 DAYS  3   • Insulin Degludec (TRESIBA FLEXTOUCH SC) Inject 25 Units as instructed.     • glimepiride (AMARYL) 4 MG Tab Take 8 mg by mouth 2 times a day.     • HYDROcodone-acetaminophen (NORCO) 5-325 MG Tab per tablet TK 1 T PO TID  0   • BD PEN NEEDLE BRE U/F USE TO INJECT INSULIN DAILY  6   • Evolocumab (REPATHA) 140 MG/ML Solution Prefilled Syringe Inject 140 mg as instructed every 14 days. 2 Syringe 5   • cilostazol (PLETAL) 100 MG Tab Take 1 Tab by mouth every 12 hours. 60 Tab 6   • ONE TOUCH ULTRA TEST strip USE TO TEST BLOOD SUGAR ONCE DAILY AS DIRECTED  11   • HYDROcodone/acetaminophen (NORCO)  MG Tab TK 1 T PO Q 4 TO 6 H PRN P  0   • ibuprofen (MOTRIN) 800 MG Tab TK 1 T PO Q 6 TO 8 H PRN P  0   • TRESIBA FLEXTOUCH 200 UNIT/ML Solution Pen-injector INJECT 22 UNITS UNDER THE SKIN NIGHTLY  0   • temazepam (RESTORIL) 15 MG Cap TK 2 CS PO QHS PRF INSOMNIA  1   • HYDROcodone-acetaminophen (NORCO) 7.5-325 MG per tablet Take 1-2 Tabs by mouth every 6 hours as needed.     • metformin ER (GLUCOPHAGE XR) 500 MG TB24 Take 2 Tabs by mouth 2 times a day. (Patient taking differently: Take 500 mg by mouth 2 times a day.) 360 Tab 3   • vitamin D, Ergocalciferol, (DRISDOL) 23698 UNITS CAPS capsule Take 1 Cap by mouth every 7 days. (Patient not taking: Reported on 3/2/2018) 13 Cap 3   • aspirin EC (ECOTRIN) 81 MG TBEC Take 81 mg by mouth every day.     • Cholecalciferol (VITAMIN D) 2000 UNITS CAPS Take 2,000 Units by mouth every day.       No facility-administered encounter medications on file as of 1/25/2019.      Review of Systems   Constitutional: Negative for  "fever and malaise/fatigue.   Respiratory: Negative for cough and shortness of breath.    Cardiovascular: Positive for chest pain. Negative for palpitations, orthopnea, claudication, leg swelling and PND.        Typical exertional angina   Gastrointestinal: Negative for abdominal pain.   Musculoskeletal: Negative for myalgias.   Neurological: Negative for dizziness.        Objective:   /84 (BP Location: Left arm, Patient Position: Sitting, BP Cuff Size: Adult)   Pulse 100   Ht 1.74 m (5' 8.5\")   Wt 105.2 kg (232 lb)   SpO2 92%   BMI 34.76 kg/m²     Physical Exam   Constitutional: He appears well-developed.   obese   HENT:   Head: Normocephalic and atraumatic.   Eyes: EOM are normal.   Neck: No JVD present.   Cardiovascular: Normal rate, regular rhythm, normal heart sounds and intact distal pulses.    Pulmonary/Chest: Effort normal and breath sounds normal.   Musculoskeletal: Normal range of motion. He exhibits no edema.   Skin: Skin is warm and dry.   Psychiatric: He has a normal mood and affect.   Nursing note and vitals reviewed.    Lab Results   Component Value Date/Time    CHOLSTRLTOT 219 (H) 03/29/2018 07:54 AM    CHOLSTRLTOT 253 10/01/2013     (H) 03/29/2018 07:54 AM     10/01/2013    HDL 38 (L) 03/29/2018 07:54 AM    HDL 44 10/01/2013    TRIGLYCERIDE 105 03/29/2018 07:54 AM    TRIGLYCERIDE 187 10/01/2013      LE ART/KWADWO US 3/12/18   Conclusions   Bilateral.    Moderate arterial insufficiency.     Bilateral   Doppler waveform of the common femoral artery is of high amplitude and    triphasic.    Doppler waveforms at the ankle are brisk and multiphasic.    Ankle-brachial index is moderately reduced.    Toe-Brachial Index Results:   Right: 0.32  Left: 0.28    TransthoracicEcho 2/4/16  CONCLUSIONS  Left ventricular ejection fraction is visually estimated to be 65%.  Mild left ventricular hypertrophy.  Grade I diastolic dysfunction.  No significant valve disease or flow abnormalities.   No " prior study is available for comparison.     Cardiac PET 6/4/2012  CONCLUSIONS/IMPRESSIONS:  1. Negative pharmacologic PET scan for ischemia or infarction.  2. Normal left ventricular systolic function with an ejection      fraction of 68% with stress.  3. No chest pain reported and no ischemic EKG changes seen with      pharmacologic stress testing.  4. Appropriate blood pressure and heart rate response to exercise.  5. No prior nuclear stress test available for comparison.    Assessment:     1. Benign essential HTN     2. H/O right coronary artery stent placement     3. Uncontrolled type 2 diabetes mellitus with hyperglycemia (HCC)     4. Coronary artery disease due to lipid rich plaque     5. Mixed hyperlipidemia     6. Statin intolerance       Medical Decision Making:  Today's Assessment / Status / Plan:     1. CAD with new persisting angina  -recommend cardiac PET for ischemic eval  -consider repeat cath if +  -if -, consider imdur 30 mg QD  -recommend re-starting ASA 81 mg QD  -unable to tolerate statins and PCKS9 inhibitors due to myalgias, continue to follow, consider zetia?  -follow up after PET scan to review plan of care    2. HTN  -good control on no medications  -follow in office and at home    3. HLD  -see above, no medications    4. DM  -managed per patient with metformin, tresiba, and glimepiride    5. CHERIE with obesity  -un-treated due to intolerance of mask  -discussed weight loss and management program, will think about it    6. Prior smoker  -cessation in '80's    7. PVD  -no claudication  -restart ASA, no cholesterol medication    FU in clinic in 1 months with SC post PET scan    Patient verbalizes understanding and agrees with the plan of care.     Collaborating MD: Keven FENG

## 2019-01-31 ENCOUNTER — TELEPHONE (OUTPATIENT)
Dept: VASCULAR LAB | Facility: MEDICAL CENTER | Age: 74
End: 2019-01-31

## 2019-01-31 NOTE — TELEPHONE ENCOUNTER
[facility]     Left VM for pt to call the clinic and establish care with lipid clinic for PCSK9 monitoring.    Yoshi Strange, PharmD

## 2019-02-06 ENCOUNTER — TELEPHONE (OUTPATIENT)
Dept: CARDIOLOGY | Facility: MEDICAL CENTER | Age: 74
End: 2019-02-06

## 2019-02-06 ENCOUNTER — HOSPITAL ENCOUNTER (OUTPATIENT)
Dept: RADIOLOGY | Facility: MEDICAL CENTER | Age: 74
DRG: 287 | End: 2019-02-06
Attending: NURSE PRACTITIONER
Payer: MEDICARE

## 2019-02-06 DIAGNOSIS — I25.83 CORONARY ARTERY DISEASE DUE TO LIPID RICH PLAQUE: ICD-10-CM

## 2019-02-06 DIAGNOSIS — I25.10 CORONARY ARTERY DISEASE DUE TO LIPID RICH PLAQUE: ICD-10-CM

## 2019-02-06 DIAGNOSIS — E78.2 MIXED HYPERLIPIDEMIA: ICD-10-CM

## 2019-02-06 PROCEDURE — A9555 RB82 RUBIDIUM: HCPCS

## 2019-02-06 PROCEDURE — 700111 HCHG RX REV CODE 636 W/ 250 OVERRIDE (IP)

## 2019-02-06 PROCEDURE — 93018 CV STRESS TEST I&R ONLY: CPT | Performed by: INTERNAL MEDICINE

## 2019-02-06 PROCEDURE — 78492 MYOCRD IMG PET MLT RST&STRS: CPT | Mod: 26 | Performed by: INTERNAL MEDICINE

## 2019-02-07 ENCOUNTER — TELEPHONE (OUTPATIENT)
Dept: CARDIOLOGY | Facility: MEDICAL CENTER | Age: 74
End: 2019-02-07

## 2019-02-07 NOTE — TELEPHONE ENCOUNTER
procedure advice   Received: Today   Message Contents   Lorena Brown R.N.   Phone Number: [phone]             SC/chiquis     Pt's wife Patti calling to ask if angioplasty and angiogram are the same and what does each one actually do for the patient.       Please call Patti, [phone]        Answered wife's questions. She also asked about procedure codes for insurance. Advised wife that the codes can be given tomorrow with visit if scheduled.

## 2019-02-07 NOTE — PROCEDURES
ORDERING PROVIDER:  TRACY Inman    AGE:  73.  GENDER:  Male.  HEIGHT:  68 inches.  WEIGHT:  232 pounds.    INDICATION:  Coronary atherosclerosis.    PROCEDURE:  The patient reviewed and signed the acknowledgement for testing   form.  The patient was in a fasting state and was properly prepared for   testing.  An intravenous line was inserted and a flush of normal saline   followed to insure line patency.     A transmission scan was acquired for attenuation correction using the internal   Germanium sources.  The patient was then administered 26.8 mCi of   Rubidium-82.  Approximately 90 seconds after the infusion, resting imagine   were obtained with ECG-gating.  Following the resting series, the patient   administered 60 mg of dipyridamole over four minutes.  The blood pressure,   heart rate and ECG were monitored and recorded.  After the dipyridamole   infusion was completed, another transmission scan for attenuation correction   was obtained.  The patient was then administered 26.7 mCi of Rubidium-82.    Approximately 90 seconds after the infusion, Peak stress images were obtained   with ECG-gating.     CLINICAL RESPONSE:  Resting blood pressure was 99/74 with a heart rate of 100.    Immediately post-dipyridamole infusion the blood pressure was 85/65 with a   heart rate of 92.  After a recovery period the blood pressure was 144/55 with   a heart rate of 88.     The patient experienced shortness of breath and chest pressure during testing.     Aminophylline 100 mg was administered following the scan.     ELECTROCARDIOGRAPHIC FINDINGS:  Resting EKG showed sinus rhythm.  No specific   changes with pharmacologic stress.     SCINTOGRAPHIC FINDINGS:  There is a moderate inferolateral wall motion defect.    This is completely reversible.  There are no other perfusion defects   appreciated.  The QC data for the scan was reviewed and was within acceptable   limits.      GATED WALL MOTION FINDINGS:  There is  normal global and segmental function.    Resting ejection fraction is 64%, this corrects to 72% with stress.  There is   no dilation appreciated of the ventricular myocardium with stress.      CONCLUSIONS AND IMPRESSIONS:   1.  Moderate ischemia in the inferior wall.  2.  Normal function with a resting ejection fraction of 64%.  3.  Ordering provider notified at time of reading.       ____________________________________     MD SCOOBY GRAVES / JAYLEN    DD:  02/06/2019 14:44:43  DT:  02/06/2019 16:39:22    D#:  [id number]  Job#:  [id number]

## 2019-02-07 NOTE — TELEPHONE ENCOUNTER
+ PET, needs [facility]   Received: Today   Message Contents   KAI Holt R.N.   Cc: Nani Waterman P.A.-C.             [place] called with + PET, needs repeat [facility] please for eval of coronaries.     No heavy exertional activities until cath. If chest pain worsens, utilize ER.     FU with cath and then arrange for 1-2 week HFU with myself or Nani post cath.     SC        S/w pt and explained the above information, recommendations and precautions. Pt has a follow up scheduled on the 8th, in 2 days. Instead of proceeding with scheduling procedure he would like to keep this appt and discuss things in more detail with APRN.

## 2019-02-08 ENCOUNTER — TELEPHONE (OUTPATIENT)
Dept: CARDIOLOGY | Facility: MEDICAL CENTER | Age: 74
End: 2019-02-08

## 2019-02-08 ENCOUNTER — APPOINTMENT (OUTPATIENT)
Dept: RADIOLOGY | Facility: MEDICAL CENTER | Age: 74
DRG: 287 | End: 2019-02-08
Attending: EMERGENCY MEDICINE
Payer: MEDICARE

## 2019-02-08 ENCOUNTER — HOSPITAL ENCOUNTER (INPATIENT)
Facility: MEDICAL CENTER | Age: 74
LOS: 3 days | DRG: 287 | End: 2019-02-11
Attending: EMERGENCY MEDICINE | Admitting: INTERNAL MEDICINE
Payer: MEDICARE

## 2019-02-08 ENCOUNTER — OFFICE VISIT (OUTPATIENT)
Dept: CARDIOLOGY | Facility: MEDICAL CENTER | Age: 74
End: 2019-02-08
Payer: MEDICARE

## 2019-02-08 VITALS
SYSTOLIC BLOOD PRESSURE: 138 MMHG | OXYGEN SATURATION: 93 % | HEART RATE: 92 BPM | HEIGHT: 69 IN | DIASTOLIC BLOOD PRESSURE: 74 MMHG | BODY MASS INDEX: 34.76 KG/M2

## 2019-02-08 DIAGNOSIS — I25.83 CORONARY ARTERY DISEASE DUE TO LIPID RICH PLAQUE: ICD-10-CM

## 2019-02-08 DIAGNOSIS — Z95.5 H/O RIGHT CORONARY ARTERY STENT PLACEMENT: Chronic | ICD-10-CM

## 2019-02-08 DIAGNOSIS — Z78.9 STATIN INTOLERANCE: ICD-10-CM

## 2019-02-08 DIAGNOSIS — G47.33 OBSTRUCTIVE SLEEP APNEA: ICD-10-CM

## 2019-02-08 DIAGNOSIS — I10 BENIGN ESSENTIAL HTN: Chronic | ICD-10-CM

## 2019-02-08 DIAGNOSIS — I25.10 CORONARY ARTERY DISEASE DUE TO LIPID RICH PLAQUE: ICD-10-CM

## 2019-02-08 DIAGNOSIS — Z87.891 PERSONAL HISTORY OF TOBACCO USE: ICD-10-CM

## 2019-02-08 DIAGNOSIS — I73.9 PERIPHERAL ARTERIAL DISEASE (HCC): ICD-10-CM

## 2019-02-08 DIAGNOSIS — R07.9 CHEST PAIN, UNSPECIFIED TYPE: ICD-10-CM

## 2019-02-08 DIAGNOSIS — E66.3 OVERWEIGHT: Chronic | ICD-10-CM

## 2019-02-08 DIAGNOSIS — E78.2 MIXED HYPERLIPIDEMIA: ICD-10-CM

## 2019-02-08 DIAGNOSIS — E11.65 UNCONTROLLED TYPE 2 DIABETES MELLITUS WITH HYPERGLYCEMIA (HCC): ICD-10-CM

## 2019-02-08 PROBLEM — I24.9 ACS (ACUTE CORONARY SYNDROME) (HCC): Status: ACTIVE | Noted: 2019-02-08

## 2019-02-08 LAB
ALBUMIN SERPL BCP-MCNC: 4.2 G/DL (ref 3.2–4.9)
ALBUMIN/GLOB SERPL: 1.1 G/DL
ALP SERPL-CCNC: 71 U/L (ref 30–99)
ALT SERPL-CCNC: 19 U/L (ref 2–50)
ANION GAP SERPL CALC-SCNC: 9 MMOL/L (ref 0–11.9)
APTT PPP: 27.6 SEC (ref 24.7–36)
AST SERPL-CCNC: 16 U/L (ref 12–45)
BASOPHILS # BLD AUTO: 0.3 % (ref 0–1.8)
BASOPHILS # BLD: 0.02 K/UL (ref 0–0.12)
BILIRUB SERPL-MCNC: 0.4 MG/DL (ref 0.1–1.5)
BNP SERPL-MCNC: 4 PG/ML (ref 0–100)
BUN SERPL-MCNC: 21 MG/DL (ref 8–22)
CALCIUM SERPL-MCNC: 9.3 MG/DL (ref 8.5–10.5)
CHLORIDE SERPL-SCNC: 102 MMOL/L (ref 96–112)
CHOLEST SERPL-MCNC: 245 MG/DL (ref 100–199)
CO2 SERPL-SCNC: 23 MMOL/L (ref 20–33)
CREAT SERPL-MCNC: 1.08 MG/DL (ref 0.5–1.4)
EKG IMPRESSION: NORMAL
EKG IMPRESSION: NORMAL
EOSINOPHIL # BLD AUTO: 0.19 K/UL (ref 0–0.51)
EOSINOPHIL NFR BLD: 2.6 % (ref 0–6.9)
ERYTHROCYTE [DISTWIDTH] IN BLOOD BY AUTOMATED COUNT: 45.5 FL (ref 35.9–50)
EST. AVERAGE GLUCOSE BLD GHB EST-MCNC: 212 MG/DL
GLOBULIN SER CALC-MCNC: 3.8 G/DL (ref 1.9–3.5)
GLUCOSE BLD-MCNC: 108 MG/DL (ref 65–99)
GLUCOSE BLD-MCNC: 142 MG/DL (ref 65–99)
GLUCOSE SERPL-MCNC: 222 MG/DL (ref 65–99)
HBA1C MFR BLD: 9 % (ref 0–5.6)
HCT VFR BLD AUTO: 44.7 % (ref 42–52)
HDLC SERPL-MCNC: 42 MG/DL
HGB BLD-MCNC: 15.4 G/DL (ref 14–18)
IMM GRANULOCYTES # BLD AUTO: 0.02 K/UL (ref 0–0.11)
IMM GRANULOCYTES NFR BLD AUTO: 0.3 % (ref 0–0.9)
INR PPP: 0.96 (ref 0.87–1.13)
LDLC SERPL CALC-MCNC: 179 MG/DL
LIPASE SERPL-CCNC: 13 U/L (ref 11–82)
LYMPHOCYTES # BLD AUTO: 2.08 K/UL (ref 1–4.8)
LYMPHOCYTES NFR BLD: 28.1 % (ref 22–41)
MCH RBC QN AUTO: 34.4 PG (ref 27–33)
MCHC RBC AUTO-ENTMCNC: 34.5 G/DL (ref 33.7–35.3)
MCV RBC AUTO: 99.8 FL (ref 81.4–97.8)
MONOCYTES # BLD AUTO: 0.77 K/UL (ref 0–0.85)
MONOCYTES NFR BLD AUTO: 10.4 % (ref 0–13.4)
NEUTROPHILS # BLD AUTO: 4.31 K/UL (ref 1.82–7.42)
NEUTROPHILS NFR BLD: 58.3 % (ref 44–72)
NRBC # BLD AUTO: 0 K/UL
NRBC BLD-RTO: 0 /100 WBC
PLATELET # BLD AUTO: 181 K/UL (ref 164–446)
PMV BLD AUTO: 11.2 FL (ref 9–12.9)
POTASSIUM SERPL-SCNC: 4.2 MMOL/L (ref 3.6–5.5)
PROT SERPL-MCNC: 8 G/DL (ref 6–8.2)
PROTHROMBIN TIME: 12.9 SEC (ref 12–14.6)
RBC # BLD AUTO: 4.48 M/UL (ref 4.7–6.1)
SODIUM SERPL-SCNC: 134 MMOL/L (ref 135–145)
TRIGL SERPL-MCNC: 121 MG/DL (ref 0–149)
TROPONIN I SERPL-MCNC: <0.01 NG/ML (ref 0–0.04)
WBC # BLD AUTO: 7.4 K/UL (ref 4.8–10.8)

## 2019-02-08 PROCEDURE — 700105 HCHG RX REV CODE 258: Performed by: INTERNAL MEDICINE

## 2019-02-08 PROCEDURE — 360979 HCHG DIAGNOSTIC CATH

## 2019-02-08 PROCEDURE — C1894 INTRO/SHEATH, NON-LASER: HCPCS

## 2019-02-08 PROCEDURE — 700102 HCHG RX REV CODE 250 W/ 637 OVERRIDE(OP): Performed by: STUDENT IN AN ORGANIZED HEALTH CARE EDUCATION/TRAINING PROGRAM

## 2019-02-08 PROCEDURE — 83036 HEMOGLOBIN GLYCOSYLATED A1C: CPT

## 2019-02-08 PROCEDURE — 85025 COMPLETE CBC W/AUTO DIFF WBC: CPT

## 2019-02-08 PROCEDURE — 99152 MOD SED SAME PHYS/QHP 5/>YRS: CPT

## 2019-02-08 PROCEDURE — 700102 HCHG RX REV CODE 250 W/ 637 OVERRIDE(OP): Performed by: INTERNAL MEDICINE

## 2019-02-08 PROCEDURE — 93458 L HRT ARTERY/VENTRICLE ANGIO: CPT

## 2019-02-08 PROCEDURE — 93005 ELECTROCARDIOGRAM TRACING: CPT | Performed by: EMERGENCY MEDICINE

## 2019-02-08 PROCEDURE — 93010 ELECTROCARDIOGRAM REPORT: CPT | Performed by: INTERNAL MEDICINE

## 2019-02-08 PROCEDURE — C1769 GUIDE WIRE: HCPCS

## 2019-02-08 PROCEDURE — 80061 LIPID PANEL: CPT

## 2019-02-08 PROCEDURE — 99153 MOD SED SAME PHYS/QHP EA: CPT

## 2019-02-08 PROCEDURE — A9270 NON-COVERED ITEM OR SERVICE: HCPCS | Performed by: INTERNAL MEDICINE

## 2019-02-08 PROCEDURE — 4A023N7 MEASUREMENT OF CARDIAC SAMPLING AND PRESSURE, LEFT HEART, PERCUTANEOUS APPROACH: ICD-10-PCS | Performed by: INTERNAL MEDICINE

## 2019-02-08 PROCEDURE — 85610 PROTHROMBIN TIME: CPT

## 2019-02-08 PROCEDURE — 84484 ASSAY OF TROPONIN QUANT: CPT

## 2019-02-08 PROCEDURE — 80053 COMPREHEN METABOLIC PANEL: CPT

## 2019-02-08 PROCEDURE — 700111 HCHG RX REV CODE 636 W/ 250 OVERRIDE (IP): Performed by: INTERNAL MEDICINE

## 2019-02-08 PROCEDURE — 93000 ELECTROCARDIOGRAM COMPLETE: CPT | Performed by: INTERNAL MEDICINE

## 2019-02-08 PROCEDURE — 99152 MOD SED SAME PHYS/QHP 5/>YRS: CPT | Performed by: INTERNAL MEDICINE

## 2019-02-08 PROCEDURE — B2111ZZ FLUOROSCOPY OF MULTIPLE CORONARY ARTERIES USING LOW OSMOLAR CONTRAST: ICD-10-PCS | Performed by: INTERNAL MEDICINE

## 2019-02-08 PROCEDURE — 700111 HCHG RX REV CODE 636 W/ 250 OVERRIDE (IP)

## 2019-02-08 PROCEDURE — 82962 GLUCOSE BLOOD TEST: CPT

## 2019-02-08 PROCEDURE — 93458 L HRT ARTERY/VENTRICLE ANGIO: CPT | Mod: 26 | Performed by: INTERNAL MEDICINE

## 2019-02-08 PROCEDURE — 700101 HCHG RX REV CODE 250

## 2019-02-08 PROCEDURE — B2151ZZ FLUOROSCOPY OF LEFT HEART USING LOW OSMOLAR CONTRAST: ICD-10-PCS | Performed by: INTERNAL MEDICINE

## 2019-02-08 PROCEDURE — 85730 THROMBOPLASTIN TIME PARTIAL: CPT

## 2019-02-08 PROCEDURE — 770020 HCHG ROOM/CARE - TELE (206)

## 2019-02-08 PROCEDURE — 36415 COLL VENOUS BLD VENIPUNCTURE: CPT

## 2019-02-08 PROCEDURE — 304952 HCHG R 2 PADS

## 2019-02-08 PROCEDURE — 83690 ASSAY OF LIPASE: CPT

## 2019-02-08 PROCEDURE — 99223 1ST HOSP IP/OBS HIGH 75: CPT | Mod: GC | Performed by: INTERNAL MEDICINE

## 2019-02-08 PROCEDURE — 99214 OFFICE O/P EST MOD 30 MIN: CPT | Performed by: NURSE PRACTITIONER

## 2019-02-08 PROCEDURE — 71045 X-RAY EXAM CHEST 1 VIEW: CPT

## 2019-02-08 PROCEDURE — 99221 1ST HOSP IP/OBS SF/LOW 40: CPT | Performed by: INTERNAL MEDICINE

## 2019-02-08 PROCEDURE — 83880 ASSAY OF NATRIURETIC PEPTIDE: CPT

## 2019-02-08 PROCEDURE — 93005 ELECTROCARDIOGRAM TRACING: CPT | Performed by: INTERNAL MEDICINE

## 2019-02-08 PROCEDURE — 99291 CRITICAL CARE FIRST HOUR: CPT

## 2019-02-08 PROCEDURE — 93005 ELECTROCARDIOGRAM TRACING: CPT

## 2019-02-08 PROCEDURE — 307093 HCHG TR BAND RADIAL

## 2019-02-08 RX ORDER — VERAPAMIL HYDROCHLORIDE 2.5 MG/ML
INJECTION, SOLUTION INTRAVENOUS
Status: COMPLETED
Start: 2019-02-08 | End: 2019-02-08

## 2019-02-08 RX ORDER — HEPARIN SODIUM,PORCINE 1000/ML
VIAL (ML) INJECTION
Status: COMPLETED
Start: 2019-02-08 | End: 2019-02-08

## 2019-02-08 RX ORDER — ZOLPIDEM TARTRATE 5 MG/1
5 TABLET ORAL NIGHTLY PRN
Status: DISCONTINUED | OUTPATIENT
Start: 2019-02-08 | End: 2019-02-11 | Stop reason: HOSPADM

## 2019-02-08 RX ORDER — SODIUM CHLORIDE 9 MG/ML
INJECTION, SOLUTION INTRAVENOUS CONTINUOUS
Status: DISCONTINUED | OUTPATIENT
Start: 2019-02-08 | End: 2019-02-08

## 2019-02-08 RX ORDER — ZOLPIDEM TARTRATE 5 MG/1
5 TABLET ORAL NIGHTLY PRN
COMMUNITY

## 2019-02-08 RX ORDER — ISOSORBIDE MONONITRATE 30 MG/1
30 TABLET, EXTENDED RELEASE ORAL EVERY MORNING
Qty: 30 TAB | Refills: 11 | Status: SHIPPED | OUTPATIENT
Start: 2019-02-08 | End: 2019-02-08 | Stop reason: CLARIF

## 2019-02-08 RX ORDER — HEPARIN SODIUM 1000 [USP'U]/ML
6000 INJECTION, SOLUTION INTRAVENOUS; SUBCUTANEOUS ONCE
Status: COMPLETED | OUTPATIENT
Start: 2019-02-08 | End: 2019-02-08

## 2019-02-08 RX ORDER — ASPIRIN 81 MG/1
324 TABLET, CHEWABLE ORAL ONCE
Status: COMPLETED | OUTPATIENT
Start: 2019-02-08 | End: 2019-02-08

## 2019-02-08 RX ORDER — MIDAZOLAM HYDROCHLORIDE 1 MG/ML
INJECTION INTRAMUSCULAR; INTRAVENOUS
Status: COMPLETED
Start: 2019-02-08 | End: 2019-02-08

## 2019-02-08 RX ORDER — POLYETHYLENE GLYCOL 3350 17 G/17G
1 POWDER, FOR SOLUTION ORAL
Status: DISCONTINUED | OUTPATIENT
Start: 2019-02-08 | End: 2019-02-11 | Stop reason: HOSPADM

## 2019-02-08 RX ORDER — SODIUM CHLORIDE 9 MG/ML
INJECTION, SOLUTION INTRAVENOUS CONTINUOUS
Status: DISCONTINUED | OUTPATIENT
Start: 2019-02-08 | End: 2019-02-09

## 2019-02-08 RX ORDER — ROSUVASTATIN CALCIUM 20 MG/1
10 TABLET, COATED ORAL EVERY EVENING
Status: DISCONTINUED | OUTPATIENT
Start: 2019-02-08 | End: 2019-02-11 | Stop reason: HOSPADM

## 2019-02-08 RX ORDER — ACETAMINOPHEN 325 MG/1
650 TABLET ORAL EVERY 6 HOURS PRN
Status: DISCONTINUED | OUTPATIENT
Start: 2019-02-08 | End: 2019-02-11 | Stop reason: HOSPADM

## 2019-02-08 RX ORDER — INSULIN GLARGINE 100 [IU]/ML
12 INJECTION, SOLUTION SUBCUTANEOUS EVERY EVENING
Status: DISCONTINUED | OUTPATIENT
Start: 2019-02-08 | End: 2019-02-08

## 2019-02-08 RX ORDER — HEPARIN SODIUM 1000 [USP'U]/ML
3200 INJECTION, SOLUTION INTRAVENOUS; SUBCUTANEOUS PRN
Status: DISCONTINUED | OUTPATIENT
Start: 2019-02-08 | End: 2019-02-11

## 2019-02-08 RX ORDER — BISACODYL 10 MG
10 SUPPOSITORY, RECTAL RECTAL
Status: DISCONTINUED | OUTPATIENT
Start: 2019-02-08 | End: 2019-02-11 | Stop reason: HOSPADM

## 2019-02-08 RX ORDER — DEXTROSE MONOHYDRATE 25 G/50ML
25 INJECTION, SOLUTION INTRAVENOUS
Status: DISCONTINUED | OUTPATIENT
Start: 2019-02-08 | End: 2019-02-09

## 2019-02-08 RX ORDER — INSULIN GLARGINE 100 [IU]/ML
20 INJECTION, SOLUTION SUBCUTANEOUS EVERY EVENING
Status: DISCONTINUED | OUTPATIENT
Start: 2019-02-08 | End: 2019-02-11 | Stop reason: HOSPADM

## 2019-02-08 RX ORDER — ASPIRIN 325 MG
325 TABLET ORAL DAILY
Status: DISCONTINUED | OUTPATIENT
Start: 2019-02-09 | End: 2019-02-09

## 2019-02-08 RX ORDER — LIDOCAINE HYDROCHLORIDE 20 MG/ML
INJECTION, SOLUTION INFILTRATION; PERINEURAL
Status: COMPLETED
Start: 2019-02-08 | End: 2019-02-08

## 2019-02-08 RX ORDER — AMOXICILLIN 250 MG
2 CAPSULE ORAL 2 TIMES DAILY
Status: DISCONTINUED | OUTPATIENT
Start: 2019-02-08 | End: 2019-02-11 | Stop reason: HOSPADM

## 2019-02-08 RX ADMIN — METOPROLOL TARTRATE 25 MG: 25 TABLET, FILM COATED ORAL at 17:40

## 2019-02-08 RX ADMIN — MIDAZOLAM HYDROCHLORIDE 1 MG: 1 INJECTION, SOLUTION INTRAMUSCULAR; INTRAVENOUS at 15:20

## 2019-02-08 RX ADMIN — HEPARIN SODIUM 6000 UNITS: 1000 INJECTION, SOLUTION INTRAVENOUS; SUBCUTANEOUS at 18:01

## 2019-02-08 RX ADMIN — MIDAZOLAM HYDROCHLORIDE 2 MG: 1 INJECTION, SOLUTION INTRAMUSCULAR; INTRAVENOUS at 14:38

## 2019-02-08 RX ADMIN — NITROGLYCERIN 10 ML: 20 INJECTION INTRAVENOUS at 14:38

## 2019-02-08 RX ADMIN — FENTANYL CITRATE 100 MCG: 50 INJECTION, SOLUTION INTRAMUSCULAR; INTRAVENOUS at 14:38

## 2019-02-08 RX ADMIN — SODIUM CHLORIDE: 9 INJECTION, SOLUTION INTRAVENOUS at 17:41

## 2019-02-08 RX ADMIN — ASPIRIN 81 MG 324 MG: 81 TABLET ORAL at 13:33

## 2019-02-08 RX ADMIN — SODIUM CHLORIDE: 9 INJECTION, SOLUTION INTRAVENOUS at 13:33

## 2019-02-08 RX ADMIN — HEPARIN SODIUM 1200 UNITS/HR: 5000 INJECTION, SOLUTION INTRAVENOUS at 18:02

## 2019-02-08 RX ADMIN — VERAPAMIL HYDROCHLORIDE 5 MG: 2.5 INJECTION, SOLUTION INTRAVENOUS at 14:38

## 2019-02-08 RX ADMIN — HEPARIN SODIUM: 1000 INJECTION, SOLUTION INTRAVENOUS; SUBCUTANEOUS at 14:38

## 2019-02-08 RX ADMIN — FENTANYL CITRATE 50 MCG: 50 INJECTION, SOLUTION INTRAMUSCULAR; INTRAVENOUS at 15:20

## 2019-02-08 RX ADMIN — LIDOCAINE HYDROCHLORIDE: 20 INJECTION, SOLUTION INFILTRATION; PERINEURAL at 14:38

## 2019-02-08 RX ADMIN — HEPARIN SODIUM: 200 INJECTION, SOLUTION INTRAVENOUS at 14:15

## 2019-02-08 ASSESSMENT — ENCOUNTER SYMPTOMS
FEVER: 0
ORTHOPNEA: 0
SHORTNESS OF BREATH: 0
BLURRED VISION: 1
COUGH: 1
BACK PAIN: 0
TREMORS: 0
SENSORY CHANGE: 0
TINGLING: 0
HEADACHES: 0
HEARTBURN: 0
DOUBLE VISION: 0
CLAUDICATION: 0
BACK PAIN: 1
HEMOPTYSIS: 0
SHORTNESS OF BREATH: 0
MYALGIAS: 0
NAUSEA: 1
MYALGIAS: 0
DIZZINESS: 0
BLOOD IN STOOL: 0
ORTHOPNEA: 0
CLAUDICATION: 0
PALPITATIONS: 0
PND: 0
PALPITATIONS: 0
FALLS: 0
NECK PAIN: 0
CHILLS: 0
VOMITING: 0
WEIGHT LOSS: 0
DIZZINESS: 0
SEIZURES: 0
PHOTOPHOBIA: 0
ABDOMINAL PAIN: 0
SPEECH CHANGE: 0
FEVER: 0
WHEEZING: 0
COUGH: 0
CONSTIPATION: 0
DIAPHORESIS: 0
DIARRHEA: 0
ABDOMINAL PAIN: 0
DEPRESSION: 0
FOCAL WEAKNESS: 0
NAUSEA: 1
SPUTUM PRODUCTION: 0

## 2019-02-08 ASSESSMENT — LIFESTYLE VARIABLES
SUBSTANCE_ABUSE: 0
ALCOHOL_USE: NO
EVER_SMOKED: YES

## 2019-02-08 ASSESSMENT — COGNITIVE AND FUNCTIONAL STATUS - GENERAL
MOVING FROM LYING ON BACK TO SITTING ON SIDE OF FLAT BED: A LITTLE
MOBILITY SCORE: 20
WALKING IN HOSPITAL ROOM: A LITTLE
CLIMB 3 TO 5 STEPS WITH RAILING: A LITTLE
SUGGESTED CMS G CODE MODIFIER DAILY ACTIVITY: CH
DAILY ACTIVITIY SCORE: 24
STANDING UP FROM CHAIR USING ARMS: A LITTLE
SUGGESTED CMS G CODE MODIFIER MOBILITY: CJ

## 2019-02-08 ASSESSMENT — PATIENT HEALTH QUESTIONNAIRE - PHQ9
2. FEELING DOWN, DEPRESSED, IRRITABLE, OR HOPELESS: NOT AT ALL
SUM OF ALL RESPONSES TO PHQ9 QUESTIONS 1 AND 2: 0
1. LITTLE INTEREST OR PLEASURE IN DOING THINGS: NOT AT ALL

## 2019-02-08 NOTE — ED TRIAGE NOTES
Chief Complaint   Patient presents with   • Chest Pain     Sent from cardiology office for ACS. Rates pain 2/10.  Chart up for ERP.

## 2019-02-08 NOTE — ED NOTES
Per Graciela BOURGEOIS from cath lab, she will come to ED to help transport pt to holding area.

## 2019-02-08 NOTE — LETTER
"     [facility]   [address]  [place] [address]  Phone: [phone]  Fax: [phone]              Brijesh Brown  1945    Encounter Date: 2/8/2019    KAI Holt          PROGRESS NOTE:  Chief Complaint   Patient presents with   • HTN (Controlled)     FV     Subjective:   Brijesh Brown is a 73 y.o. male who presents today for follow up on stress imaging.    He is a patient of Dr. Abida Waterman in our office.    Hx of CAD with prior LAD stenting X3 in '04, DM, obesity, HLD, PVD, and HTN.    He presents today with his wife. His wife is anxious about the results, he seems calm.    As of last night, his symptoms progressed to nausea, worsening chest tightness/back pain with radiation to his left jaw. EKG was obtained.    His EKG, recent stress imaging, worsening symptoms, and medical history were reviewed with Dr. Matthews, we both alongside the patient agree with ER evaluation and consideration for urgent cath today or tomorrow with worsening symptoms. Cardiology rounding team, cath lab, and ER were made aware of patient. He was wheeled over to the ER with nurse and family.    Past Medical History:   Diagnosis Date   • Benign essential HTN 4/23/2012   • CAD (coronary artery disease)     S/P NIKKI X 3 in '04   • Depression 4/23/2012   • Myalgia    • Other and unspecified hyperlipidemia 12/20/2013   • Overweight(278.02) 4/23/2012   • PVD (peripheral vascular disease) ([facility]) 4/23/2012     Past Surgical History:   Procedure Laterality Date   • CARDIAC CATH       Family History   Problem Relation Age of Onset   • Lung Disease Mother    • Heart Disease Father         \"bad valve\"   • Diabetes Sister    • Heart Disease Sister      Social History     Social History   • Marital status:      Spouse name: N/A   • Number of children: N/A   • Years of education: N/A     Occupational History   • Not on file.     Social History Main " Topics   • Smoking status: Former Smoker     Packs/day: 1.00     Years: 20.00     Types: Cigarettes     Quit date: 1/1/1984   • Smokeless tobacco: Never Used      Comment: quit '84, 20 py history   • Alcohol use No   • Drug use: Unknown   • Sexual activity: Not on file      Comment:  48 years     Other Topics Concern   • Not on file     Social History Narrative   • No narrative on file     Allergies   Allergen Reactions   • Statins [Hmg-Coa-R Inhibitors]      Caused muscle pain     Outpatient Encounter Prescriptions as of 2/8/2019   Medication Sig Dispense Refill   • [DISCONTINUED] isosorbide mononitrate SR (IMDUR) 30 MG TABLET SR 24 HR Take 1 Tab by mouth every morning. 30 Tab 11   • aspirin EC (ECOTRIN) 81 MG Tablet Delayed Response Take 1 Tab by mouth every day. 30 Tab 11   • [DISCONTINUED] metFORMIN ER (GLUCOPHAGE XR) 500 MG TABLET SR 24 HR TK 2 TS PO BID  0   • [DISCONTINUED] BD PEN NEEDLE BRE U/F USE TO INJECT INSULIN DAILY  6   • [DISCONTINUED] zolpidem (AMBIEN) 5 MG Tab TK 1 T PO QHS FOR 30 DAYS  3   • [DISCONTINUED] ONE TOUCH ULTRA TEST strip USE TO TEST BLOOD SUGAR ONCE DAILY AS DIRECTED  11   • Insulin Degludec (TRESIBA FLEXTOUCH) 200 UNIT/ML Solution Pen-injector Inject 26 Units as instructed every evening.     • glimepiride (AMARYL) 4 MG Tab Take 4 mg by mouth 2 times a day.       No facility-administered encounter medications on file as of 2/8/2019.      Review of Systems   Constitutional: Negative for fever and malaise/fatigue.   Respiratory: Negative for cough and shortness of breath.    Cardiovascular: Positive for chest pain. Negative for palpitations, orthopnea, claudication, leg swelling and PND.   Gastrointestinal: Positive for nausea. Negative for abdominal pain.   Musculoskeletal: Positive for back pain. Negative for myalgias.   Neurological: Negative for dizziness.        Objective:   /74 (BP Location: Left arm, Patient Position: Sitting, BP Cuff Size: Adult)   Pulse 92   Ht  "1.74 m (5' 8.5\")   SpO2 93%   BMI 34.76 kg/m²      Physical Exam   Constitutional: He appears well-developed.   Central obesity   HENT:   Head: Normocephalic and atraumatic.   Eyes: EOM are normal.   Neck: No JVD present.   Cardiovascular: Normal rate, regular rhythm, normal heart sounds and intact distal pulses.    Pulmonary/Chest: Effort normal and breath sounds normal.   Musculoskeletal: Normal range of motion. He exhibits no edema.   Skin: Skin is warm and dry.   Psychiatric: He has a normal mood and affect.   Nursing note and vitals reviewed.      Assessment:     1. Benign essential HTN     2. Coronary artery disease due to lipid rich plaque  EKG   3. H/O right coronary artery stent placement  EKG   4. Mixed hyperlipidemia     5. Obstructive sleep apnea     6. Overweight     7. Peripheral arterial disease (HCC)     8. Personal history of tobacco use     9. Statin intolerance     10. Uncontrolled type 2 diabetes mellitus with hyperglycemia (HCC)       Medical Decision Making:  Today's Assessment / Status / Plan:     1. CAD, prior stents X3 to LAD in '04  -last seen a few weeks ago with angina  -stress imaging was ordered and now + with moderate ischemia in inferior region  -patient presented to office with worsening symptoms of chest tightness, back pain, L neck pain, and nausea as of last night  -sent for ER eval and admission for [facility]  -rounding cardiology team aware of patient  -cont asa, unable to tolerate statins/PCSK9 inhibitors   -discuss management post cath    2. HTN  -moderate control  -no medications  -follow post cath    3. HLD  -unmanaged as patient is intolerant to statins and PCSK9 inhibitors, follow post cath    4. DM  -managed by PCP    5. Obesity  -needs weight loss regimen  -discuss at hospital follow up post cath    6. PAD  -no claudication  -cont asa, needs cholesterol management  -follow clinically    7. Prior smoker  -now cessation for >30 years  -20 pack years    FU in clinic in 2 weeks " with SC post cath    Patient verbalizes understanding and agrees with the plan of care.     Collaborating MD: Byron FENG        No Recipients

## 2019-02-08 NOTE — ED PROVIDER NOTES
ED Provider Note    CHIEF COMPLAINT  Chief Complaint   Patient presents with   • Chest Pain       HPI  Brijesh Brown is a 73 y.o. male here for evaluation of chest pain.  The patient states that he has been having intermittent chest heaviness associated with nausea and radiation to the left side of his neck, over the last few days.  He was sent in here by Dr. Waterman, after he had an abnormal stress test we believe done on February 6.  The patient has no vomiting, no shortness of breath, and no abdominal pain.  He has no fever, no chills.  Nothing alleviates or exacerbates his symptoms.  He does take some aspirin intermittently, but not consistently.  He describes his pain is an aching pain, but nonradiating.    PAST MEDICAL HISTORY   has a past medical history of Benign essential HTN (4/23/2012); CAD (coronary artery disease); Depression (4/23/2012); Diabetes ([facility]); Myalgia; Other and unspecified hyperlipidemia (12/20/2013); Overweight(278.02) (4/23/2012); and PVD (peripheral vascular disease) ([facility]) (4/23/2012).    SOCIAL HISTORY  Social History     Social History Main Topics   • Smoking status: Former Smoker     Packs/day: 1.00     Years: 20.00     Types: Cigarettes     Quit date: 1/1/1984   • Smokeless tobacco: Never Used      Comment: quit '84, 20 py history   • Alcohol use No   • Drug use: Unknown   • Sexual activity: Not on file      Comment:  48 years       Family History  No bleeding disorders.     SURGICAL HISTORY   has a past surgical history that includes cardiac cath.    CURRENT MEDICATIONS  Home Medications     Reviewed by Leighton Barr (Pharmacy Tech) on 02/08/19 at 1304  Med List Status: Complete   Medication Last Dose Status   aspirin EC (ECOTRIN) 81 MG Tablet Delayed Response 2/8/2019 Active   glimepiride (AMARYL) 4 MG Tab 2/8/2019 Active   Insulin Degludec (TRESIBA FLEXTOUCH) 200 UNIT/ML Solution Pen-injector 2/7/2019 Active   metFORMIN (GLUCOPHAGE) 500 MG Tab 2/8/2019 Active    zolpidem (AMBIEN) 5 MG Tab PRN Active                ALLERGIES  Allergies   Allergen Reactions   • Statins [Hmg-Coa-R Inhibitors]      Caused muscle pain       REVIEW OF SYSTEMS  See HPI for further details. Review of systems as above, otherwise all other systems are negative.     PHYSICAL EXAM  Constitutional: Well developed, well nourished. No acute distress.  HEENT: Normocephalic, atraumatic. Posterior pharynx clear and moist.  Eyes:  EOMI. Normal sclera.  Neck: Supple, Full range of motion, nontender.  Chest/Pulmonary: clear to ausculation. Symmetrical expansion.   Cardio: Regular rate and rhythm with no murmur.   Abdomen: Soft, nontender. No peritoneal signs. No guarding. No palpable masses  Musculoskeletal: No deformity, no edema, neurovascular intact.   Neuro: Clear speech, appropriate, cooperative, cranial nerves II-XII grossly intact.  Psych: Normal mood and affect    Results for orders placed or performed during the hospital encounter of 02/08/19   Troponin   Result Value Ref Range    Troponin I <0.01 0.00 - 0.04 ng/mL   Btype Natriuretic Peptide   Result Value Ref Range    B Natriuretic Peptide 4 0 - 100 pg/mL   CBC with Differential   Result Value Ref Range    WBC 7.4 4.8 - 10.8 K/uL    RBC 4.48 (L) 4.70 - 6.10 M/uL    Hemoglobin 15.4 14.0 - 18.0 g/dL    Hematocrit 44.7 42.0 - 52.0 %    MCV 99.8 (H) 81.4 - 97.8 fL    MCH 34.4 (H) 27.0 - 33.0 pg    MCHC 34.5 33.7 - 35.3 g/dL    RDW 45.5 35.9 - 50.0 fL    Platelet Count 181 164 - 446 K/uL    MPV 11.2 9.0 - 12.9 fL    Neutrophils-Polys 58.30 44.00 - 72.00 %    Lymphocytes 28.10 22.00 - 41.00 %    Monocytes 10.40 0.00 - 13.40 %    Eosinophils 2.60 0.00 - 6.90 %    Basophils 0.30 0.00 - 1.80 %    Immature Granulocytes 0.30 0.00 - 0.90 %    Nucleated RBC 0.00 /100 WBC    Neutrophils (Absolute) 4.31 1.82 - 7.42 K/uL    Lymphs (Absolute) 2.08 1.00 - 4.80 K/uL    Monos (Absolute) 0.77 0.00 - 0.85 K/uL    Eos (Absolute) 0.19 0.00 - 0.51 K/uL    Baso (Absolute)  0.02 0.00 - 0.12 K/uL    Immature Granulocytes (abs) 0.02 0.00 - 0.11 K/uL    NRBC (Absolute) 0.00 K/uL   Complete Metabolic Panel (CMP)   Result Value Ref Range    Sodium 134 (L) 135 - 145 mmol/L    Potassium 4.2 3.6 - 5.5 mmol/L    Chloride 102 96 - 112 mmol/L    Co2 23 20 - 33 mmol/L    Anion Gap 9.0 0.0 - 11.9    Glucose 222 (H) 65 - 99 mg/dL    Bun 21 8 - 22 mg/dL    Creatinine 1.08 0.50 - 1.40 mg/dL    Calcium 9.3 8.5 - 10.5 mg/dL    AST(SGOT) 16 12 - 45 U/L    ALT(SGPT) 19 2 - 50 U/L    Alkaline Phosphatase 71 30 - 99 U/L    Total Bilirubin 0.4 0.1 - 1.5 mg/dL    Albumin 4.2 3.2 - 4.9 g/dL    Total Protein 8.0 6.0 - 8.2 g/dL    Globulin 3.8 (H) 1.9 - 3.5 g/dL    A-G Ratio 1.1 g/dL   Prothrombin Time   Result Value Ref Range    PT 12.9 12.0 - 14.6 sec    INR 0.96 0.87 - 1.13   APTT   Result Value Ref Range    APTT 27.6 24.7 - 36.0 sec   Lipase   Result Value Ref Range    Lipase 13 11 - 82 U/L   ESTIMATED GFR   Result Value Ref Range    GFR If African American >60 >60 mL/min/1.73 m 2    GFR If Non African American >60 >60 mL/min/1.73 m 2   EKG   Result Value Ref Range    Report       [facility] Emergency Dept.    Test Date:  2019  Pt Name:    [facility] CONKLIN                  Department: ER  MRN:        [id number]                      Room:       RD 05  Gender:     Male                         Technician: [id number]  :        1945                   Requested By:ER TRIAGE PROTOCOL  Order #:    [id number]                    Reading MD:    Measurements  Intervals                                Axis  Rate:       84                           P:          32  SD:         168                          QRS:        -6  QRSD:       92                           T:          23  QT:         372  QTc:        440    Interpretive Statements  SINUS RHYTHM  RSR' IN V1 OR V2, PROBABLY NORMAL VARIANT  Compared to ECG 2019 10:44:46  RSR' in V1 or V2 now present       DX-CHEST-LIMITED (1 VIEW)   Final Result       Bilateral lung base atelectasis.            PROCEDURES     MEDICAL RECORD  I have reviewed patient's medical record and pertinent results are listed above.    COURSE & MEDICAL DECISION MAKING  I have reviewed any medical record information, laboratory studies and radiographic results as noted above.    Dr. Galan, has come down to see the patient, and states that he is going to send the patient to the Cath Lab.  At this time the patient has mild chest discomfort, but described as only as a slight heaviness.  He has no vomiting, and no current nausea.    I have been into speak to the family regarding her lab work, EKG, and the tentative plan.  The patient will be admitted to [facility], and they are seeing the patient now as well.    CRITICAL CARE  The very real possibility of a deterioration of this patient's condition required the highest level of my preparedness for sudden, emergent intervention.  I provided critical care services, which included medication orders, frequent reevaluations of the patient's condition and response to treatment, ordering and reviewing test results, and discussing the case with various consultants.  The critical care time associated with the care of the patient was 35 minutes. Review chart for interventions. This time is exclusive of any other billable procedures.       FINAL IMPRESSION  Chest pain  Critical care 35 minutes.       Electronically signed by: Mando Stevenson, 2/8/2019 1:37 PM

## 2019-02-08 NOTE — PROGRESS NOTES
"Chief Complaint   Patient presents with   • HTN (Controlled)     FV     Subjective:   Brijesh Brown is a 73 y.o. male who presents today for follow up on stress imaging.    He is a patient of Dr. Abida Waterman in our office.    Hx of CAD with prior LAD stenting X3 in '04, DM, obesity, HLD, PVD, and HTN.    He presents today with his wife. His wife is anxious about the results, he seems calm.    As of last night, his symptoms progressed to nausea, worsening chest tightness/back pain with radiation to his left jaw. EKG was obtained.    His EKG, recent stress imaging, worsening symptoms, and medical history were reviewed with Dr. Matthews, we both alongside the patient agree with ER evaluation and consideration for urgent cath today or tomorrow with worsening symptoms. Cardiology rounding team, cath lab, and ER were made aware of patient. He was wheeled over to the ER with nurse and family.    Past Medical History:   Diagnosis Date   • Benign essential HTN 4/23/2012   • CAD (coronary artery disease)     S/P NIKKI X 3 in '04   • Depression 4/23/2012   • Myalgia    • Other and unspecified hyperlipidemia 12/20/2013   • Overweight(278.02) 4/23/2012   • PVD (peripheral vascular disease) ([facility]) 4/23/2012     Past Surgical History:   Procedure Laterality Date   • CARDIAC CATH       Family History   Problem Relation Age of Onset   • Lung Disease Mother    • Heart Disease Father         \"bad valve\"   • Diabetes Sister    • Heart Disease Sister      Social History     Social History   • Marital status:      Spouse name: N/A   • Number of children: N/A   • Years of education: N/A     Occupational History   • Not on file.     Social History Main Topics   • Smoking status: Former Smoker     Packs/day: 1.00     Years: 20.00     Types: Cigarettes     Quit date: 1/1/1984   • Smokeless tobacco: Never Used      Comment: quit '84, 20 py history   • Alcohol use No   • Drug use: Unknown   • Sexual activity: Not on file      " "Comment:  48 years     Other Topics Concern   • Not on file     Social History Narrative   • No narrative on file     Allergies   Allergen Reactions   • Statins [Hmg-Coa-R Inhibitors]      Caused muscle pain     Outpatient Encounter Prescriptions as of 2/8/2019   Medication Sig Dispense Refill   • [DISCONTINUED] isosorbide mononitrate SR (IMDUR) 30 MG TABLET SR 24 HR Take 1 Tab by mouth every morning. 30 Tab 11   • aspirin EC (ECOTRIN) 81 MG Tablet Delayed Response Take 1 Tab by mouth every day. 30 Tab 11   • [DISCONTINUED] metFORMIN ER (GLUCOPHAGE XR) 500 MG TABLET SR 24 HR TK 2 TS PO BID  0   • [DISCONTINUED] BD PEN NEEDLE BRE U/F USE TO INJECT INSULIN DAILY  6   • [DISCONTINUED] zolpidem (AMBIEN) 5 MG Tab TK 1 T PO QHS FOR 30 DAYS  3   • [DISCONTINUED] ONE TOUCH ULTRA TEST strip USE TO TEST BLOOD SUGAR ONCE DAILY AS DIRECTED  11   • Insulin Degludec (TRESIBA FLEXTOUCH) 200 UNIT/ML Solution Pen-injector Inject 26 Units as instructed every evening.     • glimepiride (AMARYL) 4 MG Tab Take 4 mg by mouth 2 times a day.       No facility-administered encounter medications on file as of 2/8/2019.      Review of Systems   Constitutional: Negative for fever and malaise/fatigue.   Respiratory: Negative for cough and shortness of breath.    Cardiovascular: Positive for chest pain. Negative for palpitations, orthopnea, claudication, leg swelling and PND.   Gastrointestinal: Positive for nausea. Negative for abdominal pain.   Musculoskeletal: Positive for back pain. Negative for myalgias.   Neurological: Negative for dizziness.        Objective:   /74 (BP Location: Left arm, Patient Position: Sitting, BP Cuff Size: Adult)   Pulse 92   Ht 1.74 m (5' 8.5\")   SpO2 93%   BMI 34.76 kg/m²     Physical Exam   Constitutional: He appears well-developed.   Central obesity   HENT:   Head: Normocephalic and atraumatic.   Eyes: EOM are normal.   Neck: No JVD present.   Cardiovascular: Normal rate, regular rhythm, normal " heart sounds and intact distal pulses.    Pulmonary/Chest: Effort normal and breath sounds normal.   Musculoskeletal: Normal range of motion. He exhibits no edema.   Skin: Skin is warm and dry.   Psychiatric: He has a normal mood and affect.   Nursing note and vitals reviewed.      Assessment:     1. Benign essential HTN     2. Coronary artery disease due to lipid rich plaque  EKG   3. H/O right coronary artery stent placement  EKG   4. Mixed hyperlipidemia     5. Obstructive sleep apnea     6. Overweight     7. Peripheral arterial disease (HCC)     8. Personal history of tobacco use     9. Statin intolerance     10. Uncontrolled type 2 diabetes mellitus with hyperglycemia (HCC)       Medical Decision Making:  Today's Assessment / Status / Plan:     1. CAD, prior stents X3 to LAD in '04  -last seen a few weeks ago with angina  -stress imaging was ordered and now + with moderate ischemia in inferior region  -patient presented to office with worsening symptoms of chest tightness, back pain, L neck pain, and nausea as of last night  -sent for ER eval and admission for [facility]  -rounding cardiology team aware of patient  -cont asa, unable to tolerate statins/PCSK9 inhibitors   -discuss management post cath    2. HTN  -moderate control  -no medications  -follow post cath    3. HLD  -unmanaged as patient is intolerant to statins and PCSK9 inhibitors, follow post cath    4. DM  -managed by PCP    5. Obesity  -needs weight loss regimen  -discuss at hospital follow up post cath    6. PAD  -no claudication  -cont asa, needs cholesterol management  -follow clinically    7. Prior smoker  -now cessation for >30 years  -20 pack years    FU in clinic in 2 weeks with SC post cath    Patient verbalizes understanding and agrees with the plan of care.     Collaborating MD: Byron FENG

## 2019-02-08 NOTE — ASSESSMENT & PLAN NOTE
Last KWADWO 3/2018: Moderate arterial insufficiency, not on any medications. No active claudication.  - On Aspirin

## 2019-02-08 NOTE — TELEPHONE ENCOUNTER
Late entry: at approximately 11am, pt was transferred from cardiology office to ER- Red 5 via wheelchair. pts wife accompanied us. Pt was transferred without issues. Report was given to ER nurse.

## 2019-02-08 NOTE — ED NOTES
Pt reports that he had minor pain and pressure that started in his back, then moved to his neck and radiated up to his left ear. Pt reports that it was more pressure and discomfort rates it 2/10. States that Dr. Waterman sent him over for further eval.     Report to CHRISTELLE Marin

## 2019-02-08 NOTE — ASSESSMENT & PLAN NOTE
Pt admitted for exertional and resting angina. Trop and EKG was WNL. H/o 3 stents in 2004. Follows with Dr ALTAGRACIA Waterman as outpaient.   S/p PCI on 02/08: High grade mid LAD, distal LAD, ostial LCX, Proximal LCX stenosis, diffuse RCA disease with patent stents in LAD and RCA.  BETTY score for UA : 4 points ( 20% risk at 14 days of all cause mortality)  Plan  - Cardiology and cardiac surgery following. Pending decision on CABG as pt is high risk for procedure. Likely discussion tomorrow.   - Continue heparin drip, ASA, Metoprolol and crestor.  - ECHO reading pending

## 2019-02-08 NOTE — ASSESSMENT & PLAN NOTE
Has Myopathy with statins. However he needs to be on statin given recent cardiac symptoms and findings. He is willing to give it a try.  - Started Crestor 02/08

## 2019-02-08 NOTE — PROCEDURES
DATE OF SERVICE:  02/08/2019    REFERRING PHYSICIAN:  Fabian Galan MD    PROCEDURES:  1.  Left heart catheterization.  2.  Coronary angiography.  3.  Left ventriculogram.  4.  Monitoring of conscious sedation.    PREPROCEDURE DIAGNOSIS:  1.  Unstable angina.  2.  Abnormal cardiac PET scan.    POSTPROCEDURE DIAGNOSES:  1.  Multivessel coronary artery disease including patent stent in the mid left   anterior descending artery with bifurcation of a patent stent into the   diagonal branch, high grade mid left anterior descending artery stenosis,   high-grade distal left anterior descending artery stenosis, high-grade ostial   circumflex artery stenosis, high-grade proximal left circumflex artery   stenosis, patent stent in the right coronary artery with diffuse   nonobstructive stenosis.  2.  Normal left ventricular systolic function with ejection fraction of 60%.  3.  Elevated left ventricular end-diastolic pressure.    INDICATION:  The patient is a 73-year-old male with past medical history   significant for coronary artery disease with stent placement of the mid left   anterior descending artery with 3.0x18 mm Cypher drug-eluting stent with   bifurcating stent with 2.0x13 mm pixel bare metal stent as well as proximal   right coronary artery with 3.5x13 mm Cypher drug-eluting stent by Dr. Julian Castellon at [facility] in 2004, and a history of diabetes.    The patient has been experiencing chest pain and underwent a cardiac PET scan,   which shows large inferior wall ischemia.  He was scheduled for cardiac   catheterization.    DESCRIPTION OF PROCEDURE:  After informed consent was signed by the patient,   the patient was brought to the cardiac catheterization laboratory.  He was   prepped and draped in the usual sterile manner.  The right wrist area was   anesthetized with 2% Xylocaine.  A 6-Serbian sheath was inserted into the right   radial artery using the modified Seldinger technique under ultrasound    guidance.  Intra-arterial verapamil and nitroglycerin were given.  IV heparin   was given.  A 4-Beninese pigtail catheter was positioned into the left   ventricle.  Left ventriculography was performed.  No other catheters would   cross the tortuous porch subclavian artery.  The catheters were removed.  The   right inguinal area was anesthetized with 2% Xylocaine.  A 4-Beninese sheath was   inserted into the right femoral artery using modified Seldinger technique   under ultrasound guidance.  A JL4 catheter was positioned into the left main   coronary artery.  Coronary angiography was performed.  This was exchanged for   a 4-Beninese 3DRC catheter, which was positioned into right coronary artery.    Coronary angiography was performed.  The case was discussed with Dr. Fabian Galan.  It was decided to discuss the case with cardiothoracic surgery.  The   patient tolerated the procedure well.  At the end of procedure, all catheters   and sheaths were removed.  Hemoband was placed on the right wrist.  He was   transferred to telemetry in stable condition.    HEMODYNAMIC DATA:  Hemodynamic data shows aortic pressures of 140/80 with mean   of 100 mmHg and /0 with LVEDP of 20 mmHg.    AORTIC VALVE:  There is no significant gradient noted.    LEFT VENTRICULOGRAM:  A 10 mL of contrast was delivered for 3 seconds.    Ejection fraction was estimated to be 60%.  There was no segmental wall motion   abnormalities noted.    ANGIOGRAM:  1.  Left main coronary artery:  Left main coronary artery is a moderate   length, moderate caliber vessel with diffuse 20-30% stenosis.  2.  Left anterior descending artery:  Left anterior descending artery is a   long, moderate-caliber vessel, which wraps around the apex.  Proximal portion   of the vessel is heavily calcified with diffuse stenosis of 20-30%.  There is   a patent stent in the mid portion with in-stent restenosis of 20-30%.  There   is a bifurcating stent from the mid stent,  which is patent, going to a third   diagonal branch noted.  Beyond the stents, the left anterior descending artery   contains a concentric 90% stenosis of the mid portion, a second 70% stenosis   of the mid to distal portion and 99% diffuse stenosis of the distal portion.    There are additional diagonal branches noted free of disease.  3.  Left circumflex artery:  Left circumflex artery is a moderate caliber   vessel with ostial concentric 80% stenosis followed by a proximal concentric   90% stenosis followed by aneurysm.  There is a moderate caliber bifurcating   obtuse marginal branch noted free of disease.  4.  Right coronary artery:  Right coronary artery is a dominant,   moderate-caliber vessel with proximal concentric 60% stenosis.  Proximal   patent stent with diffuse in-stent restenosis.  Mid to distal portion of the   vessel contains diffuse stenosis with ectasia of 60%.  Distal portion of the   vessel, there is a concentric 60% stenosis prior to small caliber posterior   descending artery and posterolateral branches.    IMPRESSION:  1.  Multivessel coronary artery disease including patent stent in the mid left   anterior descending artery with bifurcation of a patent stent into the   diagonal branch, high grade mid left anterior descending artery stenosis,   high-grade distal left anterior descending artery stenosis, high-grade ostial   circumflex artery stenosis, high-grade proximal left circumflex artery   stenosis, patent stent in the right coronary artery with diffuse   nonobstructive stenosis.  2.  Normal left ventricular systolic function with ejection fraction of 60%.  3.  Elevated left ventricular end-diastolic pressure.    RECOMMENDATIONS:  Recommend consultation with CT surgery.    SEDATION: The patient's sedation was managed by myself with continuous   face to face time with the patient for 15 minutes from 14:10 to 14:25.       ____________________________________     JACQUELIN MOORE MD    [facility]  / JAYLEN    DD:  02/08/2019 15:25:29  DT:  02/08/2019 15:41:59    D#:  [id number]  Job#:  [id number]

## 2019-02-08 NOTE — ASSESSMENT & PLAN NOTE
Not on meds at home. H/o statin and PCKS9 inhibitors intolerance. Per last cardiology note : consider starting Zetia.  - 02/08: , , .   - Goal LDL for him will be < 70, he is willing to try statin. Trial of Crestor 10mg.   - Dietary modification counseling

## 2019-02-08 NOTE — ED NOTES
Per MD Stevenson, pt okat to be transported to cath lab at this time. Pt transported on zoll to cath lab per cath lab CHRISTELLE Mehta. Typing RN remains at bedside. Pt appears comfortable and in NAD, pt belongings kept with family at bedside.

## 2019-02-08 NOTE — ASSESSMENT & PLAN NOTE
Prior LAD stenting x 3 in 2004  No h/o HF. Last Echo : EF 65%. Normal systolic function. Grade I diastolic dysfunction.   Having worsening exertional chest pain x 1 year, stress test 02/06/2019: positive with moderate ischemia in the inferior region.  Plan as stated in unstable angina

## 2019-02-08 NOTE — ASSESSMENT & PLAN NOTE
At home controlled with Glimiperide 4 mg BID, Metformin 1000 BID, and Insulin Deglutec 26 U (Last A1c 10/2015: 7.4)  , during hospital stay blood sugar <200.  - Oral meds on hold. On Lantus 12 U + ISS, hypoglycemia protocol and diabetic diet.

## 2019-02-08 NOTE — CONSULTS
"DATE OF SERVICE:  02/08/2019    CARDIAC CONSULTATION    CHIEF COMPLAINT:  Chest discomfort.    HISTORY OF PRESENT ILLNESS:  The patient is a pleasant 73-year-old gentleman   with history of known coronary artery disease who was sent from our cardiology office to   the ER for evaluation of chest pain.    He has a long history of coronary artery disease.  Initially, he  underwent angiography on   11/18/2004, and at that time had a 2.0x13 mm PIXEL stent placed in his LAD   diagonal and a Cypher stent placed in his LAD.  He also had a Cypher stent placed in his RCA.  He was   absolutely asymptomatic until about a year ago when he started developing   effort angina.  He walked about 75 yards and then developed chest discomfort.    The pain would venessa in about 2-3 minutes and he resumed walking, noted to   have recurrent pain at the same distance and again relieved with rest.  His   pattern was absolutely unchanged over the last year.  He had a myocardial PET   scan performed on 02/06 in preparation for a routine office visit.  This   showed \"moderate ischemia\" in the inferior wall and normal ejection fraction.    This morning, the patient was awakened from sleep with a pressure-like   sensation across his upper chest.  He described it as 1.5/10 in intensity.    This started about 5:00 a.m. and persisted.  By the time he got to our office   today, the discomfort that radiated into the left side of his jaw, but the   chest pain had largely abated.  He also has some discomfort radiating to his   back.    Cardiac risk factor profile is positive for long history of diabetes mellitus.    He has history of hypertension and hyperlipidemia.  The patient quit smoking   in 1984.  There is family history of coronary artery disease in that father   had bypass grafting at age 56.    There is no history of orthopnea, exertional dyspnea, or dependent edema.  He   also has no sense of palpitations.    In reviewing the cath report, which " was a bit difficult to interpret, the   3.0x18 mm stent was actually in the RCA.  There is also a stent in the LAD and   LAD diagonal.    MEDICATIONS ON ADMISSION:  Aspirin 81 mg a day, Amaryl 4 mg a day, metformin   ____ twice daily, and Ambien for sleep.    ALLERGIES:  HE STATES HE HAS INTOLERANCE TO STATINS.    FAMILY HISTORY:  Father had bypass at age 56.  Sister has history of   congenital heart disease.  Mother  at age 87.    ILLNESSES:  Coronary artery disease as described above.  Hypertension.    Hyperlipidemia.  Type 2 diabetes.  History of embolic stroke, eye with   residual visual defect, recurrent kidney stones.  History of depression per   chart.  History of elevated PSA.    SOCIAL HISTORY:  The patient is .  He quit smoking in .    REVIEW OF SYSTEMS:  CONSTITUTIONAL:  He has had no weight loss.  He was in his usual state of   health until this morning's events.  NEUROLOGIC:  History of stroke manifested by visual disturbance, which is   chronic.  PSYCHIATRIC:  History of depression in the past.  PULMONARY:  Denies exertional dyspnea or wheezing.  CARDIAC:  As above.  GASTROINTESTINAL:  History of pancreatitis with recurrence.  No recent   abdominal pain.  No rectal bleeding.  RENAL:  History of recurrent kidney stones.  No hematuria recently.  GENITOURINARY:  History of elevated PSA and some difficulty voiding.  The   patient has refused suggested prostate biopsy.  MUSCULOSKELETAL:  No recent trauma.  HEMATOPOIETIC:  No recent easy bleeding.    PHYSICAL EXAMINATION:  GENERAL:  The patient is resting comfortably in no distress.  VITAL SIGNS:  On the monitor, he is in sinus rhythm at 88, blood pressure   148/85, and pulse is 88.  HEAD:  Pupils are equal, sclerae nonicteric.  EOM intact.  Gaze conjugate.  NECK:  There is no JVD, no bruit, no masses, no adenopathy, no thyromegaly.    Carotid upstroke intact.  LUNGS:  Clear to auscultation.  BACK:  Without deformity.  HEART:  Regular rate and  rhythm with apical S4.  No S3, no murmur.  ABDOMEN:  Obese, soft.  No hepatomegaly, no masses.  No bruit.  No evidence of   aortic aneurysm.  EXTREMITIES:  No edema.  Posterior tibial pulses are 1+.  SKIN:  Warm and dry.  Musculature is normal.  NEUROLOGIC:  Patient is alert and cooperative without facial droop.    PERTINENT LABORATORY DATA:  Hemoglobin is 15.4, potassium is 4.2, and GFR is   greater than 60.  Initial troponin is negative.    EKG demonstrates sinus rhythm with inferior Q-waves and tall R waves in V2 and   V3.    IMPRESSION:  1.  Acute coronary syndrome.  Patient has history of known coronary artery   disease with prior stenting.  He had classic angina this morning with   radiation to his jaw.  He still has minimal (0.5/10) discomfort in his jaw and   taken urgently to the cath lab for coronary angiography and intervention.    The procedure was explained to the patient and family.  The inherent risks of   conscious sedation, bleeding, stroke, heart attack, and kidney damage were   discussed.  2.  Status post stenting.  The patient's cath report from 2004 was reviewed.    It is a bit difficult to interpret, but he apparently actually had 1 stent   (3.0x13 mm Cypher stent) in his right coronary artery, 1 stent in his left   anterior descending, as well as a Pixel stent placed in  the left anterior descending diagonal.  3.  Diabetes mellitus type 2.  4.  Statin intolerance.  I will discuss this with the patient.  5.  History of hypertension.  6.  Elevated PSA.  7.  Kidney stones.  8.  History of depression.    PLAN:  As outlined above, he was given an additional aspirin in the ER, was   taken urgently to the cath lab for coronary angiography.       ____________________________________     MD ARABELLA CHERY / NTS    DD:  02/08/2019 14:11:30  DT:  02/08/2019 14:56:27    D#:  [id number]  Job#:  [id number]

## 2019-02-09 ENCOUNTER — APPOINTMENT (OUTPATIENT)
Dept: RADIOLOGY | Facility: MEDICAL CENTER | Age: 74
DRG: 287 | End: 2019-02-09
Attending: NURSE PRACTITIONER
Payer: MEDICARE

## 2019-02-09 PROBLEM — I20.0 UNSTABLE ANGINA (HCC): Status: ACTIVE | Noted: 2019-02-08

## 2019-02-09 LAB
ALBUMIN SERPL BCP-MCNC: 3.5 G/DL (ref 3.2–4.9)
ALBUMIN/GLOB SERPL: 1.3 G/DL
ALP SERPL-CCNC: 57 U/L (ref 30–99)
ALT SERPL-CCNC: 15 U/L (ref 2–50)
ANION GAP SERPL CALC-SCNC: 9 MMOL/L (ref 0–11.9)
APTT PPP: 51.6 SEC (ref 24.7–36)
APTT PPP: 57.9 SEC (ref 24.7–36)
APTT PPP: 62.6 SEC (ref 24.7–36)
APTT PPP: 75.3 SEC (ref 24.7–36)
AST SERPL-CCNC: 13 U/L (ref 12–45)
BASOPHILS # BLD AUTO: 0.2 % (ref 0–1.8)
BASOPHILS # BLD: 0.02 K/UL (ref 0–0.12)
BILIRUB SERPL-MCNC: 0.5 MG/DL (ref 0.1–1.5)
BUN SERPL-MCNC: 18 MG/DL (ref 8–22)
CALCIUM SERPL-MCNC: 8.9 MG/DL (ref 8.5–10.5)
CHLORIDE SERPL-SCNC: 106 MMOL/L (ref 96–112)
CO2 SERPL-SCNC: 22 MMOL/L (ref 20–33)
CREAT SERPL-MCNC: 0.83 MG/DL (ref 0.5–1.4)
EKG IMPRESSION: NORMAL
EKG IMPRESSION: NORMAL
EOSINOPHIL # BLD AUTO: 0.14 K/UL (ref 0–0.51)
EOSINOPHIL NFR BLD: 1.5 % (ref 0–6.9)
ERYTHROCYTE [DISTWIDTH] IN BLOOD BY AUTOMATED COUNT: 45.8 FL (ref 35.9–50)
GLOBULIN SER CALC-MCNC: 2.6 G/DL (ref 1.9–3.5)
GLUCOSE BLD-MCNC: 145 MG/DL (ref 65–99)
GLUCOSE BLD-MCNC: 147 MG/DL (ref 65–99)
GLUCOSE BLD-MCNC: 160 MG/DL (ref 65–99)
GLUCOSE BLD-MCNC: 162 MG/DL (ref 65–99)
GLUCOSE BLD-MCNC: 184 MG/DL (ref 65–99)
GLUCOSE BLD-MCNC: 207 MG/DL (ref 65–99)
GLUCOSE SERPL-MCNC: 230 MG/DL (ref 65–99)
HCT VFR BLD AUTO: 39 % (ref 42–52)
HGB BLD-MCNC: 13.3 G/DL (ref 14–18)
IMM GRANULOCYTES # BLD AUTO: 0.05 K/UL (ref 0–0.11)
IMM GRANULOCYTES NFR BLD AUTO: 0.5 % (ref 0–0.9)
LYMPHOCYTES # BLD AUTO: 1.86 K/UL (ref 1–4.8)
LYMPHOCYTES NFR BLD: 20.4 % (ref 22–41)
MAGNESIUM SERPL-MCNC: 1.8 MG/DL (ref 1.5–2.5)
MCH RBC QN AUTO: 34.2 PG (ref 27–33)
MCHC RBC AUTO-ENTMCNC: 34.1 G/DL (ref 33.7–35.3)
MCV RBC AUTO: 100.3 FL (ref 81.4–97.8)
MONOCYTES # BLD AUTO: 0.82 K/UL (ref 0–0.85)
MONOCYTES NFR BLD AUTO: 9 % (ref 0–13.4)
NEUTROPHILS # BLD AUTO: 6.21 K/UL (ref 1.82–7.42)
NEUTROPHILS NFR BLD: 68.4 % (ref 44–72)
NRBC # BLD AUTO: 0 K/UL
NRBC BLD-RTO: 0 /100 WBC
PLATELET # BLD AUTO: 156 K/UL (ref 164–446)
PMV BLD AUTO: 11.2 FL (ref 9–12.9)
POTASSIUM SERPL-SCNC: 3.9 MMOL/L (ref 3.6–5.5)
PROT SERPL-MCNC: 6.1 G/DL (ref 6–8.2)
RBC # BLD AUTO: 3.89 M/UL (ref 4.7–6.1)
SODIUM SERPL-SCNC: 137 MMOL/L (ref 135–145)
TROPONIN I SERPL-MCNC: <0.01 NG/ML (ref 0–0.04)
WBC # BLD AUTO: 9.1 K/UL (ref 4.8–10.8)

## 2019-02-09 PROCEDURE — 99233 SBSQ HOSP IP/OBS HIGH 50: CPT | Performed by: INTERNAL MEDICINE

## 2019-02-09 PROCEDURE — 93005 ELECTROCARDIOGRAM TRACING: CPT | Performed by: INTERNAL MEDICINE

## 2019-02-09 PROCEDURE — 99232 SBSQ HOSP IP/OBS MODERATE 35: CPT | Mod: GC | Performed by: INTERNAL MEDICINE

## 2019-02-09 PROCEDURE — 36415 COLL VENOUS BLD VENIPUNCTURE: CPT

## 2019-02-09 PROCEDURE — 83735 ASSAY OF MAGNESIUM: CPT

## 2019-02-09 PROCEDURE — 82962 GLUCOSE BLOOD TEST: CPT | Mod: 91

## 2019-02-09 PROCEDURE — 700102 HCHG RX REV CODE 250 W/ 637 OVERRIDE(OP): Performed by: STUDENT IN AN ORGANIZED HEALTH CARE EDUCATION/TRAINING PROGRAM

## 2019-02-09 PROCEDURE — 700102 HCHG RX REV CODE 250 W/ 637 OVERRIDE(OP): Performed by: INTERNAL MEDICINE

## 2019-02-09 PROCEDURE — 770020 HCHG ROOM/CARE - TELE (206)

## 2019-02-09 PROCEDURE — 700111 HCHG RX REV CODE 636 W/ 250 OVERRIDE (IP): Performed by: STUDENT IN AN ORGANIZED HEALTH CARE EDUCATION/TRAINING PROGRAM

## 2019-02-09 PROCEDURE — A9270 NON-COVERED ITEM OR SERVICE: HCPCS | Performed by: INTERNAL MEDICINE

## 2019-02-09 PROCEDURE — 93970 EXTREMITY STUDY: CPT | Mod: 26 | Performed by: SURGERY

## 2019-02-09 PROCEDURE — 80053 COMPREHEN METABOLIC PANEL: CPT

## 2019-02-09 PROCEDURE — 700105 HCHG RX REV CODE 258: Performed by: INTERNAL MEDICINE

## 2019-02-09 PROCEDURE — 93880 EXTRACRANIAL BILAT STUDY: CPT

## 2019-02-09 PROCEDURE — A9270 NON-COVERED ITEM OR SERVICE: HCPCS | Performed by: STUDENT IN AN ORGANIZED HEALTH CARE EDUCATION/TRAINING PROGRAM

## 2019-02-09 PROCEDURE — 99223 1ST HOSP IP/OBS HIGH 75: CPT | Performed by: THORACIC SURGERY (CARDIOTHORACIC VASCULAR SURGERY)

## 2019-02-09 PROCEDURE — 85730 THROMBOPLASTIN TIME PARTIAL: CPT | Mod: 91

## 2019-02-09 PROCEDURE — 700111 HCHG RX REV CODE 636 W/ 250 OVERRIDE (IP): Performed by: INTERNAL MEDICINE

## 2019-02-09 PROCEDURE — 85025 COMPLETE CBC W/AUTO DIFF WBC: CPT

## 2019-02-09 PROCEDURE — 99232 SBSQ HOSP IP/OBS MODERATE 35: CPT | Performed by: INTERNAL MEDICINE

## 2019-02-09 PROCEDURE — 93970 EXTREMITY STUDY: CPT

## 2019-02-09 PROCEDURE — 93880 EXTRACRANIAL BILAT STUDY: CPT | Mod: 26 | Performed by: SURGERY

## 2019-02-09 PROCEDURE — 99356 PR PROLONGED SVC I/P OR OBS SETTING 1ST HOUR: CPT | Performed by: INTERNAL MEDICINE

## 2019-02-09 PROCEDURE — 93010 ELECTROCARDIOGRAM REPORT: CPT | Performed by: INTERNAL MEDICINE

## 2019-02-09 PROCEDURE — 84484 ASSAY OF TROPONIN QUANT: CPT

## 2019-02-09 RX ORDER — DEXTROSE MONOHYDRATE 25 G/50ML
25 INJECTION, SOLUTION INTRAVENOUS
Status: DISCONTINUED | OUTPATIENT
Start: 2019-02-09 | End: 2019-02-11 | Stop reason: HOSPADM

## 2019-02-09 RX ORDER — ONDANSETRON 4 MG/1
4 TABLET, ORALLY DISINTEGRATING ORAL EVERY 4 HOURS PRN
Status: DISCONTINUED | OUTPATIENT
Start: 2019-02-09 | End: 2019-02-11 | Stop reason: HOSPADM

## 2019-02-09 RX ORDER — MAGNESIUM SULFATE HEPTAHYDRATE 40 MG/ML
2 INJECTION, SOLUTION INTRAVENOUS ONCE
Status: COMPLETED | OUTPATIENT
Start: 2019-02-09 | End: 2019-02-09

## 2019-02-09 RX ORDER — POTASSIUM CHLORIDE 20 MEQ/1
40 TABLET, EXTENDED RELEASE ORAL ONCE
Status: COMPLETED | OUTPATIENT
Start: 2019-02-09 | End: 2019-02-09

## 2019-02-09 RX ORDER — ASPIRIN 81 MG/1
81 TABLET, CHEWABLE ORAL DAILY
Status: DISCONTINUED | OUTPATIENT
Start: 2019-02-10 | End: 2019-02-11 | Stop reason: HOSPADM

## 2019-02-09 RX ORDER — LORAZEPAM 2 MG/ML
1 INJECTION INTRAMUSCULAR ONCE
Status: DISCONTINUED | OUTPATIENT
Start: 2019-02-09 | End: 2019-02-10 | Stop reason: SDUPTHER

## 2019-02-09 RX ADMIN — MAGNESIUM SULFATE HEPTAHYDRATE 2 G: 40 INJECTION, SOLUTION INTRAVENOUS at 09:26

## 2019-02-09 RX ADMIN — METOPROLOL TARTRATE 25 MG: 25 TABLET, FILM COATED ORAL at 06:08

## 2019-02-09 RX ADMIN — HEPARIN SODIUM 3200 UNITS: 1000 INJECTION, SOLUTION INTRAVENOUS; SUBCUTANEOUS at 09:42

## 2019-02-09 RX ADMIN — ASPIRIN 325 MG: 325 TABLET ORAL at 06:08

## 2019-02-09 RX ADMIN — INSULIN GLARGINE 20 UNITS: 100 INJECTION, SOLUTION SUBCUTANEOUS at 18:22

## 2019-02-09 RX ADMIN — INSULIN LISPRO 2 UNITS: 100 INJECTION, SOLUTION INTRAVENOUS; SUBCUTANEOUS at 00:10

## 2019-02-09 RX ADMIN — ZOLPIDEM TARTRATE 5 MG: 5 TABLET ORAL at 01:17

## 2019-02-09 RX ADMIN — INSULIN LISPRO 1 UNITS: 100 INJECTION, SOLUTION INTRAVENOUS; SUBCUTANEOUS at 06:20

## 2019-02-09 RX ADMIN — HEPARIN SODIUM 1350 UNITS/HR: 5000 INJECTION, SOLUTION INTRAVENOUS at 14:37

## 2019-02-09 RX ADMIN — METOPROLOL TARTRATE 25 MG: 25 TABLET, FILM COATED ORAL at 18:18

## 2019-02-09 RX ADMIN — POTASSIUM CHLORIDE 40 MEQ: 1500 TABLET, EXTENDED RELEASE ORAL at 09:27

## 2019-02-09 RX ADMIN — SODIUM CHLORIDE: 9 INJECTION, SOLUTION INTRAVENOUS at 03:54

## 2019-02-09 ASSESSMENT — ENCOUNTER SYMPTOMS
AGITATION: 1
EYE DISCHARGE: 0
COUGH: 0
SHORTNESS OF BREATH: 0
BLURRED VISION: 0
ORTHOPNEA: 0
TREMORS: 0
WEAKNESS: 0
SPUTUM PRODUCTION: 0
DOUBLE VISION: 0
CARDIOVASCULAR NEGATIVE: 1
MYALGIAS: 1
BACK PAIN: 1
PND: 0
NAUSEA: 0
SHORTNESS OF BREATH: 1
ABDOMINAL PAIN: 0
DIZZINESS: 0
BRUISES/BLEEDS EASILY: 0
HEARTBURN: 0
ADENOPATHY: 0
NERVOUS/ANXIOUS: 0
MYALGIAS: 0
PALPITATIONS: 0
PSYCHIATRIC NEGATIVE: 1
CONSTITUTIONAL NEGATIVE: 1
WHEEZING: 0
GASTROINTESTINAL NEGATIVE: 1
DEPRESSION: 0
FEVER: 0
HEADACHES: 0
TINGLING: 0
AGITATION: 0
NEUROLOGICAL NEGATIVE: 1
DIAPHORESIS: 0
EYE PAIN: 0
NECK PAIN: 0
VOMITING: 0
CHILLS: 0
RESPIRATORY NEGATIVE: 1

## 2019-02-09 NOTE — PROGRESS NOTES
Patient ambulated to restroom no chest pain or dizziness, no bleeding from groin site, tr band still in place slight oozing with removal of air, air replaced. Will continue to monitor.

## 2019-02-09 NOTE — CARE PLAN
Problem: Safety  Goal: Will remain free from falls  Outcome: PROGRESSING AS EXPECTED  Pt educated to use call light before getting out of bed. Call light in reach. Bed locked in lowest position with 2 upper bed rails up. Pt encouraged to sit at edge of bed before standing up. No c/o dizziness. Assistance of one using the walker given to help pt to the commode and back to bed. Room floor is free from clutter. Treaded socks on pt. Bed alarm on. Adequate lighting in room. Vision aids/glasses available.      Problem: Knowledge Deficit  Goal: Knowledge of disease process/condition, treatment plan, diagnostic tests, and medications will improve    Intervention: Explain information regarding disease process/condition, treatment plan, diagnostic tests, and medications and document in education  Pt and family educated on plan of care, medications, pain management, and disease processes. Pt and family verbalized understanding and was encouraged to ask questions. All questions answered.

## 2019-02-09 NOTE — PROGRESS NOTES
Pt called stated felt clammy for about 2 minutes when walked into room pt stated it had passed, pt did not feel clammy when touched. Denies pain.  b/p taken 140s systolic heart rate and oygen WDL. Pt had not eating much dinner, checked glucose, glucose 142. No changes on heart monitor noted.

## 2019-02-09 NOTE — PROGRESS NOTES
Cardiology Follow Up Progress Note    Date of Service  2/9/2019    Attending Physician  Duane Montana M.D.    Chief Complaint   Chest pain    HPI  Brijesh Brown is a 73 y.o. male admitted 2/8/2019 with classic acute coronary syndrome.  Angiography revealed very complex and diffuse coronary artery disease.  The patient has been seen in consultation by the cardiovascular surgeons and I discussed the best option for treatment.  He remains on heparin.  He has had no angina.  Patient states his has been intolerant to statins and Repatha in the past.  Because of the severity of his coronary disease, should be rechallenged with statin.  Today's lab has been reviewed.  Renal function is normal.  Troponins are negative x2.    Review of Systems  Review of Systems   Respiratory: Negative.    Cardiovascular: Negative.    Neurological: Negative.    Hematological: Negative for adenopathy.   Psychiatric/Behavioral: Positive for agitation.       Vital signs in last 24 hours  Temp:  [36.1 °C (97 °F)-37.1 °C (98.7 °F)] 37.1 °C (98.7 °F)  Pulse:  [] 81  Resp:  [16-18] 17  BP: (100-168)/() 151/80  SpO2:  [90 %-96 %] 93 %    Physical Exam  Physical Exam   Constitutional: He is oriented to person, place, and time. He appears well-nourished. No distress.   HENT:   Head: Normocephalic and atraumatic.   Eyes: Right eye exhibits no discharge. Scleral icterus is present.   Neck: No JVD present.   Cardiovascular: Normal rate and regular rhythm.    Musculoskeletal: He exhibits no edema.   Neurological: He is alert and oriented to person, place, and time.   Skin: Skin is warm and dry.   Psychiatric: He is agitated.   Mildly agitated       Lab Review  Lab Results   Component Value Date/Time    WBC 9.1 02/09/2019 12:03 AM    RBC 3.89 (L) 02/09/2019 12:03 AM    HEMOGLOBIN 13.3 (L) 02/09/2019 12:03 AM    HEMATOCRIT 39.0 (L) 02/09/2019 12:03 AM    .3 (H) 02/09/2019 12:03 AM    MCH 34.2 (H) 02/09/2019 12:03 AM    MCHC  34.1 02/09/2019 12:03 AM    MPV 11.2 02/09/2019 12:03 AM      Lab Results   Component Value Date/Time    SODIUM 137 02/09/2019 12:03 AM    POTASSIUM 3.9 02/09/2019 12:03 AM    CHLORIDE 106 02/09/2019 12:03 AM    CO2 22 02/09/2019 12:03 AM    GLUCOSE 230 (H) 02/09/2019 12:03 AM    BUN 18 02/09/2019 12:03 AM    CREATININE 0.83 02/09/2019 12:03 AM    CREATININE 0.98 10/01/2013    BUNCREATRAT 18 06/26/2015 08:54 AM      Lab Results   Component Value Date/Time    ASTSGOT 13 02/09/2019 12:03 AM    ALTSGPT 15 02/09/2019 12:03 AM     Lab Results   Component Value Date/Time    CHOLSTRLTOT 245 (H) 02/08/2019 11:53 AM     (H) 02/08/2019 11:53 AM    HDL 42 02/08/2019 11:53 AM    TRIGLYCERIDE 121 02/08/2019 11:53 AM    TROPONINI <0.01 02/09/2019 10:42 AM       Recent Labs      02/08/19   1153   BNPBTYPENAT  4       Acute coronary syndrome: Patient admitted with classic angina.  Troponins are negative.  He has diffuse and complex coronary disease.  Surgery has been consulted they are going to review the case and discussed with 1 of your partners.  We will keep the patient on heparin over the weekend until final disposition is determined.    Hyperlipidemia: He was restarted on statins as outlined above.    Diabetes mellitus type 2 blood sugars morning 230.  Glycohemoglobin admission 9.  Discussed with patient, CV nurse practitioner, patient's family, and nursing    Fabian Galan M.D.   Cardiologist, [facility]  [phone]

## 2019-02-09 NOTE — CONSULTS
"DATE OF CONSULTATION: 2/9/2019     REFERRING PHYSICIAN: Louie Ferraro MD.     CONSULTING PHYSICIAN: Sharon Cordova M.D. FACS    CHIEF COMPLAINT: Chest Pain    HISTORY OF PRESENT ILLNESS: The patient is a 73 y.o. male who complains of chest pressure with exertion after 70 yards for the last year, it would resolved after  40-50 seconds of rest. He made an appointment with his cardiologist and had stress test that showed \"moderate ischemia\" in the inferior wall and normal ejection fraction. Two days later while at the cardiology office he had an episode of chest pressure at rest that radiated up his neck and left arm. He was taken directly to the emergency room and then to the cardiac catheterization lab. He also complains of shortness of breath with exertion for the last year. He denies dizziness, syncope, lower extremity edema, orthopnea, or PND.     PAST MEDICAL HISTORY:   Past Medical History:   Diagnosis Date   • Benign essential HTN 4/23/2012   • CAD (coronary artery disease)     S/P NIKKI X 3 in '04   • CVA, old, disturbances of vision     right eye   • Depression 4/23/2012   • Diabetes ([facility])    • Myalgia    • Other and unspecified hyperlipidemia 12/20/2013   • Overweight(278.02) 4/23/2012   • PVD (peripheral vascular disease) ([facility]) 4/23/2012       PAST SURGICAL HISTORY:   Past Surgical History:   Procedure Laterality Date   • CARDIAC CATH     • INCISION HERNIA REPAIR         FAMILY HISTORY:   Family History   Problem Relation Age of Onset   • Lung Disease Mother    • Heart Disease Father         CABG x3, then re-do CABG x 4   • Diabetes Sister    • Heart Disease Sister         SOCIAL HISTORY:   Social History     Social History   • Marital status:      Spouse name: N/A   • Number of children: N/A   • Years of education: N/A     Occupational History   • Not on file.     Social History Main Topics   • Smoking status: Former Smoker     Packs/day: 1.00     Years: 20.00     Types: Cigarettes     Quit date: " 1/1/1984   • Smokeless tobacco: Never Used      Comment: quit '84, 20 py history   • Alcohol use No   • Drug use: Unknown   • Sexual activity: Not on file      Comment:  48 years     Other Topics Concern   • Not on file     Social History Narrative   • No narrative on file       ALLERGIES:   Allergies   Allergen Reactions   • Statins [Hmg-Coa-R Inhibitors]      Caused muscle pain        CURRENT MEDICATIONS:     Current Facility-Administered Medications:   •  pneumococcal 13-Estephania Conj Vacc (PREVNAR 13) syringe 0.5 mL, 0.5 mL, Intramuscular, Once PRN, Fabian Galan M.D.  •  [START ON 2/10/2019] aspirin (ASA) chewable tab 81 mg, 81 mg, Oral, DAILY, Serena Leal M.D.  •  magnesium sulfate IVPB premix 2 g, 2 g, Intravenous, Once, Janet Napoles M.D., Last Rate: 25 mL/hr at 02/09/19 0926, 2 g at 02/09/19 0926  •  insulin lispro (HUMALOG) injection 1-6 Units, 1-6 Units, Subcutaneous, 4X/DAY ACHS **AND** Accu-Chek Q6 if NPO, , , Q AC AND BEDTIME(S) **AND** NOTIFY MD and PharmD, , , Once **AND** glucose 4 g chewable tablet 16 g, 16 g, Oral, Q15 MIN PRN **AND** dextrose 50% (D50W) injection 25 mL, 25 mL, Intravenous, Q15 MIN PRN, Duane Montana M.D.  •  NS infusion, , Intravenous, Continuous, Serena Leal M.D., Last Rate: 100 mL/hr at 02/09/19 0354  •  senna-docusate (PERICOLACE or SENOKOT S) 8.6-50 MG per tablet 2 Tab, 2 Tab, Oral, BID **AND** polyethylene glycol/lytes (MIRALAX) PACKET 1 Packet, 1 Packet, Oral, QDAY PRN **AND** magnesium hydroxide (MILK OF MAGNESIA) suspension 30 mL, 30 mL, Oral, QDAY PRN **AND** bisacodyl (DULCOLAX) suppository 10 mg, 10 mg, Rectal, QDAY PRN, Janet Napoles M.D.  •  acetaminophen (TYLENOL) tablet 650 mg, 650 mg, Oral, Q6HRS PRN, Janet Napoles M.D.  •  metoprolol (LOPRESSOR) tablet 25 mg, 25 mg, Oral, TWICE DAILY, Serena Leal M.D., 25 mg at 02/09/19 0608  •  rosuvastatin (CRESTOR) tablet 10 mg, 10 mg, Oral, Q EVENING, Serena Leal M.D.  •  zolpidem (AMBIEN) tablet 5  mg, 5 mg, Oral, HS PRN, Serena Leal M.D., 5 mg at 02/09/19 0117  •  insulin glargine (LANTUS) injection 20 Units, 20 Units, Subcutaneous, Q EVENING, Seerna Leal M.D.  •  [COMPLETED] heparin injection 6,000 Units, 6,000 Units, Intravenous, Once, 6,000 Units at 02/08/19 1801 **AND** heparin injection 3,200 Units, 3,200 Units, Intravenous, PRN, 3,200 Units at 02/09/19 0942 **AND** heparin infusion 25,000 units in 500 ml 0.45% nacl, , Intravenous, Continuous, Last Rate: 27 mL/hr at 02/09/19 0941, 1,350 Units/hr at 02/09/19 0941 **AND** Protocol 440 Heparin Weight Based DO NOT GIVE ANY HEPARIN BOLUS TO STROKE PATIENT, , , CONTINUOUS **AND** Protocol 440 Heparin Weight Based Discontinue Enoxaparin (Lovenox), Dabigatran (Pradaxa), Rivaroxaban (Xarelto), Apixaban (Eliquis), Edoxaban (Savaysa, Lixiana), Fondaparinux (Arixtra) and Argatroban prior to heparin administration, , , CONTINUOUS **AND** Protocol 440 Heparin Weight Based Draw baseline aPTT, PT, and platelet count if not already done, , , CONTINUOUS **AND** Protocol 440 Heparin Weight Based Draw aPTT 6 hours after beginning infusion. , , , CONTINUOUS **AND** Protocol 440 Heparin Weight Based Draw Platelet count every three days. Contact MD if platelet is 50% lower than baseline count., , , CONTINUOUS **AND** Protocol 440 Heparin Weight Based Record Patient Data, , , CONTINUOUS **AND** Protocol 440 Heparin Weight Based INSTRUCTIONS, , , CONTINUOUS **AND** Protocol 440 Heparin Weight Based Review aPTT results 6 hours after infusion is begun as detailed, , , CONTINUOUS **AND** Protocol 440 Heparin Weight Based Adjust heparin to maintain aPTT between 55-96 sec, , , CONTINUOUS **AND** Protocol 440 Heparin Weight Based Order aPTT 6 hours after any rate change or hold until aPTT is therapeutic (55-96 seconds), , , CONTINUOUS **AND** Protocol 440 Heparin Weight Based Documentation and verification, , , CONTINUOUS, Duane Montana M.D.     LABS REVIEWED:  Lab Results    Component Value Date/Time    SODIUM 137 02/09/2019 12:03 AM    POTASSIUM 3.9 02/09/2019 12:03 AM    CHLORIDE 106 02/09/2019 12:03 AM    CO2 22 02/09/2019 12:03 AM    GLUCOSE 230 (H) 02/09/2019 12:03 AM    BUN 18 02/09/2019 12:03 AM    CREATININE 0.83 02/09/2019 12:03 AM    CREATININE 0.98 10/01/2013    BUNCREATRAT 18 06/26/2015 08:54 AM      Lab Results   Component Value Date/Time    PROTHROMBTM 12.9 02/08/2019 11:53 AM    INR 0.96 02/08/2019 11:53 AM      Lab Results   Component Value Date/Time    WBC 9.1 02/09/2019 12:03 AM    RBC 3.89 (L) 02/09/2019 12:03 AM    HEMOGLOBIN 13.3 (L) 02/09/2019 12:03 AM    HEMATOCRIT 39.0 (L) 02/09/2019 12:03 AM    .3 (H) 02/09/2019 12:03 AM    MCH 34.2 (H) 02/09/2019 12:03 AM    MCHC 34.1 02/09/2019 12:03 AM    MPV 11.2 02/09/2019 12:03 AM    NEUTSPOLYS 68.40 02/09/2019 12:03 AM    LYMPHOCYTES 20.40 (L) 02/09/2019 12:03 AM    MONOCYTES 9.00 02/09/2019 12:03 AM    EOSINOPHILS 1.50 02/09/2019 12:03 AM    BASOPHILS 0.20 02/09/2019 12:03 AM        IMAGING REVIEWED AND INTERPRETED:    ECHOCARDIOGRAM: pending    ANGIOGRAM: 2/8/2019  Multivessel coronary artery disease including patent stent in the mid left   anterior descending artery with bifurcation of a patent stent into the   diagonal branch, high grade mid left anterior descending artery stenosis,   high-grade distal left anterior descending artery stenosis, high-grade ostial   circumflex artery stenosis, high-grade proximal left circumflex artery   stenosis, patent stent in the right coronary artery with diffuse   nonobstructive stenosis.  2.  Normal left ventricular systolic function with ejection fraction of 60%.  3.  Elevated left ventricular end-diastolic pressure.    REVIEW OF SYSTEMS:   Review of Systems   Constitutional: Negative.    HENT: Negative.    Eyes:        Right eye vision loss   Respiratory: Positive for shortness of breath.    Cardiovascular: Positive for chest pain.   Gastrointestinal: Negative.     Genitourinary: Negative.    Musculoskeletal: Positive for back pain and myalgias.   Skin: Negative.    Neurological: Negative.    Endo/Heme/Allergies: Negative.    Psychiatric/Behavioral: Negative.      All other systems were reviewed with the patient and are negative.    PHYSICAL EXAMINATION:   Physical Exam    CONSTITUTIONAL:  Blood pressure 141/73, pulse 69, temperature 36.7 °C (98.1 °F), temperature source Temporal, resp. rate 18, weight 110.2 kg (242 lb 15.2 oz), SpO2 90 %.    General appearance: Well-developed, obese 73-year-old male in no acute distress.  She is alert and oriented x3.  HEENT:  Anicteric sclerae, moist conjunctivae, moist mucous membranes, good dentition.  Normocephalic, atraumatic.  NECK:  Supple without jugular venous distention, without lymphadenopathy    SKIN:   Normal skin turgor, no stasis dermatitis or ulcers noted.  RESPIRATORY:  Clear to auscultation bilaterally, crackles bases, respirations are regular, symmetrical and unlabored.   CARDIAC:  Regular rate and rhythm, no murmurs. No carotid bruits. Peripheral pulses palpable.   GASTROINTESTINAL:  Soft, obese, non-distended, non-tender with bowel sounds present, no hepatosplenomegaly.  No palpable masses.  EXTREMITIES:  No clubbing, cyanosis, or changes consistent with peripheral vascular disease. No peripheral edema or varicosities.  NEUROLOGIC/ PSYCHIATRIC: Alert and oriented times three. Mood and affect normal.  MUSCULOSKELETAL:  Normal gait and station.  Good range of motion.      IMPRESSION:  Acute coronary syndrome  Coronary artery disease  S/P PCI to LAD and RCA 2014  Obesity  History of tobacco abuse    PLAN:  Mr. Brown has unstable angina and he is in need of revascularization.  Coronary artery bypass grafting, its risks, benefits, potential complications and alternative treatments were discussed with the patient in detail. The CABG STS mortality risk score is 2%. The CABG morbidity and mortality risk score is 10%. The scores  were discussed with patient.  The concern that I have is that the patient has a very diffuse coronary artery disease especially in the LAD and RCA.  As such, he is not an ideal candidate for coronary artery bypass grafting from an anatomic standpoint.  However, I would like to review the case with my partner Dr. Scott before making a final recommendation.    Findings and recommendations have been discussed with the patient’s cardiologist Fabian Galan M.D..  Thank you for this very challenging consultation and participation in the patient’s care.  I will keep you apprised of all future developments.        Sincerely,       NHUNG Muniz FACS, Athan Roumanas, M.D. FACS performed a substantial portion of the EM visit face-to-face with the same patient on the same date of service with the APRN, Cee Butterfield. I was personally involved in reviewing and interpreting the films and conducted elements of the history and physical exam. I performed all of the medical decision making for the patient.

## 2019-02-09 NOTE — PROGRESS NOTES
Able to remove tr band no further bleeding, bruising around site, no hematoma, pulses present. Pt verbalized understanding.

## 2019-02-09 NOTE — CARE PLAN
Problem: Safety  Goal: Will remain free from injury    Intervention: Provide assistance with mobility  Patient educated to use call light for assistance. Fall precautions in place. Staff will assist with mobilization. Hourly rounding in place.      Problem: Knowledge Deficit  Goal: Knowledge of disease process/condition, treatment plan, diagnostic tests, and medications will improve    Intervention: Assess knowledge level of disease process/condition, treatment plan, diagnostic tests, and medications  Pt educated on POC for the day, disease process, upcoming tests, and medication information. Will continue to answer questions and educate pt as necessary.

## 2019-02-09 NOTE — PROGRESS NOTES
2 RN skin check    Bilateral ears pink but blanching  Back pink from pad placement  Right radial cath site, right groin cath site

## 2019-02-09 NOTE — PROGRESS NOTES
Pt had another episode lasting a minute stating felt like he was becoming clammy and then cool. Denies pain or dizziness with episode no monitor changes, ekg done. No change in ekg

## 2019-02-09 NOTE — PROGRESS NOTES
Cardiology Follow Up Progress Note    Date of Service  2/9/2019    Attending Physician  Duane Montana M.D.    Chief Complaint: Chest pain      Patient transferred from office yesterday with history of classic rest angina.  History of remote stenting of RCA, LAD, and LAD diagonal 2004.  Angiography yesterday revealed multivessel coronary disease and he is being seen in consultation by surgery.    He has been stable overnight without any chest pain or dyspnea.  Patient continues on heparin therapy.  He has had no bleeding problems.  Had a small hematoma at the arterial puncture site.  Patient is agitated to go home.    Review of Systems  Review of Systems   Constitutional: Negative.    Respiratory: Negative.    Cardiovascular: Negative.    Neurological: Negative.    Hematological: Does not bruise/bleed easily.   Psychiatric/Behavioral: Negative for agitation.       Vital signs in last 24 hours  Temp:  [36.1 °C (97 °F)-36.7 °C (98.1 °F)] 36.7 °C (98.1 °F)  Pulse:  [] 69  Resp:  [16-18] 18  BP: (100-168)/() 141/73  SpO2:  [90 %-96 %] 90 %    Physical Exam  Physical Exam   Constitutional: He is oriented to person, place, and time. No distress.   HENT:   Head: Normocephalic and atraumatic.   Eyes: No scleral icterus.   Neck: No JVD present.   Cardiovascular: Normal rate and regular rhythm.    Very small hematoma with arterial puncture site.  No evidence of hand ischemia   Pulmonary/Chest: No respiratory distress.   Some decreased breath sounds left base   Abdominal: Soft. He exhibits no distension.   Neurological: He is alert and oriented to person, place, and time.   Skin: Skin is warm and dry.       Lab Review  Lab Results   Component Value Date/Time    WBC 9.1 02/09/2019 12:03 AM    RBC 3.89 (L) 02/09/2019 12:03 AM    HEMOGLOBIN 13.3 (L) 02/09/2019 12:03 AM    HEMATOCRIT 39.0 (L) 02/09/2019 12:03 AM    .3 (H) 02/09/2019 12:03 AM    MCH 34.2 (H) 02/09/2019 12:03 AM    MCHC 34.1 02/09/2019 12:03  AM    MPV 11.2 02/09/2019 12:03 AM      Lab Results   Component Value Date/Time    SODIUM 137 02/09/2019 12:03 AM    POTASSIUM 3.9 02/09/2019 12:03 AM    CHLORIDE 106 02/09/2019 12:03 AM    CO2 22 02/09/2019 12:03 AM    GLUCOSE 230 (H) 02/09/2019 12:03 AM    BUN 18 02/09/2019 12:03 AM    CREATININE 0.83 02/09/2019 12:03 AM    CREATININE 0.98 10/01/2013    BUNCREATRAT 18 06/26/2015 08:54 AM      Lab Results   Component Value Date/Time    ASTSGOT 13 02/09/2019 12:03 AM    ALTSGPT 15 02/09/2019 12:03 AM     Lab Results   Component Value Date/Time    CHOLSTRLTOT 245 (H) 02/08/2019 11:53 AM     (H) 02/08/2019 11:53 AM    HDL 42 02/08/2019 11:53 AM    TRIGLYCERIDE 121 02/08/2019 11:53 AM    TROPONINI <0.01 02/08/2019 11:53 AM       Recent Labs      02/08/19   1153   BNPBTYPENAT  4       Acute coronary syndrome: Patient transferred from the office with classic angina.  Initial troponin is negative.  Repeat troponin is pending.  Angiography yesterday revealed complex coronary disease.  He was seen in consultation by surgery.  The patient continues on heparin.  Laboratory reviewed.  GFR is greater than 60.  Glycohemoglobin is 9.  LDL is 179.  Patient states that he is intolerant of statins but his symptoms are very atypical.  This was discussed with he and family today.  For the short-term at least, he is now on rosuvastatin.  Awaiting scheduling of CABG.    Please contact me with any questions.    Fabian Galan M.D.   Cardiologist, [facility]  [phone]

## 2019-02-09 NOTE — PROGRESS NOTES
Internal Medicine Interval Note  Note Author: Janet Napoles M.D.     Name Brijesh Brown 1945   Age/Sex 73 y.o. male   MRN [id number]   Code Status Full     After 5PM or if no immediate response to page, please call for cross-coverage  Attending/Team: Dr. Montana/ RHONDA Ac See Patient List for primary contact information  Call [phone] to page    1st Call - Day Intern (R1):   Dr. Napoles 2nd Call - Day Sr. Resident (R2/R3):   Dr. vivas         Reason for interval visit  (Principal Problem)   Unstable angina      Interval Problem Daily Status Update  (24 hours, problem oriented, brief subjective history, new lab/imaging data pertinent to that problem)   Underwent angiogram yesterday: High grade mid LAD, distal LAD, ostial LCX, Proximal LCX stenosis, diffuse RCA disease with patent stents in LAD and RCA. HbA1C 9 on admission. CS consulted for possible CABG, await recommendation.   No arrhythmia overnight.   Appropriate post op change in cath site in right groin and wrist, no hematoma. Distal pulse palpable. Had small hematoma on left arm IV site, IV pulled out, compression bandage applied.   On Heparin drip, continue heparin drip in the hospital until he gets surgery done.   Overnight needed 2 L O2 to maintain saturation >90, No SOB at night. No current compliants of chest pain/ SOB/ palpitation. Sating >90% in room air.   IS ordered.     Review of Systems   Constitutional: Negative for chills, diaphoresis and fever.   HENT: Negative for hearing loss and tinnitus.    Eyes: Negative for blurred vision and double vision.   Respiratory: Negative for cough, sputum production and shortness of breath.    Cardiovascular: Negative for chest pain, palpitations, orthopnea, leg swelling and PND.   Gastrointestinal: Negative for abdominal pain, heartburn, nausea and vomiting.   Genitourinary: Negative for dysuria and urgency.   Musculoskeletal: Negative for myalgias and neck pain.   Skin: Negative  for itching and rash.   Neurological: Negative for dizziness, tingling, tremors and headaches.   Psychiatric/Behavioral: Negative for depression and suicidal ideas.       Disposition/Barriers to discharge:   Inpatient: Unstable angina, possible CABG    Consultants/Specialty  Cardiology   PCP: Sheridan Jackman M.D.      Quality Measures  Quality-Core Measures   Reviewed items::  EKG reviewed, Labs reviewed, Medications reviewed and Radiology images reviewed  Phelps catheter::  No Phelps  DVT prophylaxis pharmacological::  Heparin (Heparin drip for unstable angina)          Physical Exam       Vitals:    02/08/19 2230 02/09/19 0000 02/09/19 0400 02/09/19 0800   BP: 132/73 118/79 125/73 141/73   Pulse: 74 75 76 69   Resp: 17 18 18 18   Temp: 36.3 °C (97.3 °F) 36.4 °C (97.6 °F) 36.3 °C (97.3 °F) 36.7 °C (98.1 °F)   TempSrc: Temporal Temporal Temporal Temporal   SpO2: 93% 93% 91% 90%   Weight:         Body mass index is 36.4 kg/m². Weight: 110.2 kg (242 lb 15.2 oz)  Oxygen Therapy:  Pulse Oximetry: 90 %, O2 (LPM): 0, O2 Delivery: None (Room Air)    Physical Exam   Constitutional: He is oriented to person, place, and time and well-developed, well-nourished, and in no distress.   HENT:   Head: Normocephalic and atraumatic.   Eyes: Conjunctivae are normal. Right eye exhibits no discharge. Left eye exhibits no discharge.   Neck: Normal range of motion. Neck supple.   Cardiovascular: Normal rate, regular rhythm, normal heart sounds and intact distal pulses.    No murmur heard.  Pulmonary/Chest: Effort normal and breath sounds normal. No respiratory distress. He has no wheezes. He has no rales.   Abdominal: Soft. Bowel sounds are normal. He exhibits no distension. There is no tenderness. There is no rebound.   Catheter site in rt groin: No hematoma/color change   Musculoskeletal: Normal range of motion. He exhibits no edema or tenderness.   Neurological: He is alert and oriented to person, place, and time.   Skin: Skin is warm.  No erythema.   Psychiatric: Affect and judgment normal.             Assessment/Plan     * Unstable angina (HCC)   Assessment & Plan    73 years old male with past medical history of coronary artery disease (3 stents in LAD in 2004), type 2 diabetes mellitus (last HbA1c in 2015: 6.9), hypertension, dyslipidemia was admitted due to worsening chest pain which started with exertional, recently became chest pain/discomfort at rest.  No troponin elevation/EKG changes. On aspirin for last 2 weeks only after the cardilogy visit. He was not taking it because of intolerance.   - BETTY score for UA : 4 points ( 20% risk at 14 days of all call mortality, new or recurrent MI, or severe recurrent ischemia requiring urgent revascularization)  - Angiogram on 02/08: High grade mid LAD, distal LAD, ostial LCX, Proximal LCX stenosis, diffuse RCA disease with patent stents in LAD and RCA.  - Consulted CS for possible CABG  - Continue heparin drip  - Continue Metoprolol, Aspirin  - Started Crestor (02/08)  - Replete electrolytes as needed (K>4, Mg >2)     Hyperlipidemia- (present on admission)   Assessment & Plan    Not on any oral medications, has statin and PCKS9 inhibitors intolerance. Per last cardiology note : consider starting Zetia.  - 02/08: , , .   - Goal LDL for him will be < 70, he is willing to try statin. Started Crestor.   - Dietary modification counseling      CAD (coronary artery disease)- (present on admission)   Assessment & Plan    Prior LAD stenting x 3 in 2004  No h/o HF. Last Echo : EF 65%. Normal systolic function. Grade I diastolic dysfunction.   Having worsening exertional chest pain x 1 year, stress test 02/06/2019: positive with moderate ischemia in the inferior region.  Cardiac cath today     Type II diabetes mellitus (HCC)- (present on admission)   Assessment & Plan    At home controlled with Glimiperide 4 mg BID, Metformin 1000 BID, and Insulin Deglutec 26 U (Last A1c 10/2015: 7.4)  - Oral  meds on hold. Continue Lantus 12 U + ISS, during hospital stay blood sugar ranges in 150-200s  - HbA1c 9 (02/08)  - Follow up with PCP for better control of DM for the long term.      Benign essential HTN- (present on admission)   Assessment & Plan    Not on any home medications. Will continue to monitor.      Peripheral arterial disease (HCC)- (present on admission)   Assessment & Plan    Last KWADWO 3/2018: Moderate arterial insufficiency, not on any medications. No active claudication.  - On Aspirin     Statin intolerance- (present on admission)   Assessment & Plan    Has Myopathy with statins. However he needs to be on statin given recent cardiac symptoms and findings. He is willing to give it a try.  - Started Crestor 02/08

## 2019-02-09 NOTE — PROGRESS NOTES
Bedside report received from night nurse. Assumed care of pt. Pt awake, laying in bed. A/Ox4, VSS. No complaints at this time. POC reviewed and white board updated. Tele box on. Call light in reach. Bed locked in lowest position with 2 upper bed rails up. Heparin drip running at 1200 units/hour. Rate verified with off-going nurse. Pt educated to call before getting out of bed.

## 2019-02-09 NOTE — PROGRESS NOTES
Pt has medium bruise at IV insertion point on left forearm. Pt asking for IV to be removed. IV was removed and replaced with one attempt. Pt tolerated insertion well. Daughter at bedside, asking questions about POC and heparin drip. Pt verbally consented to sharing information with daughter. Daughter updated on POC and medications. Pt asking questions about side effects to magnesium and potassium medications ordered. RN educated pt and daughter on side effects and pt verbalized understanding. No other questions asked.

## 2019-02-09 NOTE — CARE PLAN
Problem: Communication  Goal: The ability to communicate needs accurately and effectively will improve    Intervention: Educate patient and significant other/support system about the plan of care, procedures, treatments, medications and allow for questions   02/09/19 0159   OTHER   Pt & Family Have Been Educated on Methods Available to Report Concerns Related to Care, Treatment, Services, and Patient Safety Issues Yes   Education on medication given, pending cardiothoracic consult,       Problem: Safety  Goal: Will remain free from falls    Intervention: Implement fall precautions   02/09/19 0159   OTHER   Environmental Precautions Treaded Slipper Socks on Patient;Report Given to Other Health Care Providers Regarding Fall Risk;Personal Belongings, Wastebasket, Call Bell etc. in Easy Reach;Bed in Low Position;Communication Sign for Patients & Families;Transferred to Stronger Side;Mobility Assessed & Appropriate Sign Placed   Bedrails Bedrails Closest to Bathroom Down   Chair/Bed Strip Alarm Yes - Alarm On         Problem: Acute Care of the Cardiac Cath Patient  Goal: Post Procedure Optimal Outcome for the Cardiac Cath Patient    Intervention: Care and monitoring of Femstop or Hemoband per order  hemoband removed post midnight no further bleeding

## 2019-02-09 NOTE — PROGRESS NOTES
Pt transported to floor, tele boxed placed, monitor room notified. Post of vitals signs started. Radial TR band in place. Femoral dressing CDI, no signs of bleeding. Pt denied pain. Will continue to monitor.

## 2019-02-09 NOTE — PROGRESS NOTES
Report received at bedside from night shift RN, pt care assumed, tele box on. Pt aaox4, no signs of distress noted at this time. Patient resting comfortably in bed. POC discussed with pt and verbalizes no questions. Pt denies any additional needs at this time. Bed in lowest position, pt educated on fall risk and verbalized understanding, call light within reach.

## 2019-02-09 NOTE — SENIOR ADMIT NOTE
Mr. Brown is a 73 y.o. 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 Julienne Waterman for possible cath.  Patient stated that he has been having dyspnea on exertion.  He used to get short of breath by walking 100 yards but now the distance of walking has reduced by half.  He also has chest pressure on exertion, duration of 60 sec. the frequency of chest pressure which has worsened since last couple of weeks to a point where he gets it during rest.  Few days ago he had radiating pain to the left jaw from the chest.  He got stress test 2 days ago and the plan was to do an elective cath however given his chest pain at rest he was referred to ER immediately.      In ER: Vitals as stated below.  Labs: WBC 7.5, sodium 134, K4.2, glucose 222, cholesterol 245, . Troponin <0.01 and EKG shows sinus rhythm.  Dr. Galan was consulted by ERP and was taken to cath.    Exam:  BP (!) 168/90   Pulse 86   Resp 16   Wt 105.2 kg (231 lb 14.8 oz)   SpO2 94%   BMI 34.75 kg/m²     Physical exam:   General: Obese looking male, not in acute distress  HEENT: NC/AT. PERRLA, EOMI. moist mucous membranes. No lymphadenopathy.  CVS: RRR, S1S2 WNL, no MRG  RS: CTA.  Abdomen: Soft, NT/ND, BS +. No organomegaly.  Abdominal hernia.   Ext: No pedal edema.  1+ DP pulse in left leg.  Neuro: CN 2-12 wnl. Motor and sensory exam wnl.      Labs:  Recent Labs      02/08/19   1153   SODIUM  134*   POTASSIUM  4.2   CHLORIDE  102   CO2  23   BUN  21   CREATININE  1.08   CALCIUM  9.3       Recent Labs      02/08/19   1153   ALTSGPT  19   ASTSGOT  16   ALKPHOSPHAT  71   TBILIRUBIN  0.4   LIPASE  13   GLUCOSE  222*       Recent Labs      02/08/19   1153   RBC  4.48*   HEMOGLOBIN  15.4   HEMATOCRIT  44.7   PLATELETCT  181   PROTHROMBTM  12.9   APTT  27.6   INR  0.96        Recent Labs      02/08/19   1153   WBC  7.4   NEUTSPOLYS  58.30   LYMPHOCYTES  28.10   MONOCYTES  10.40   EOSINOPHILS  2.60   BASOPHILS  0.30   ASTSGOT  16   ALTSGPT  19   ALKPHOSPHAT  71   TBILIRUBIN  0.4         DX-CHEST-LIMITED (1 VIEW)   Final Result      Bilateral lung base atelectasis.          Assessment and Plan:  Chest pain  Unstable angina  Classic anginal chest pain with exertion, likely unstable angina.  - Abnormal stress test 2 days ago (outpatient).  Follows with Dr. GRACIELA Waterman  S/p cath today.  Multivessel coronary disease with patent stent in mid LAD with high-grade stenosis in mid LAD and distal LAD, ostial circumflex artery and LCA was noted.  Just on aspirin at home.  Has allergic to HMG CoA inhibitors.  -Cardiology recommended CT surgery consultation for possible CABG  -Continue ASA.   -Started rosuvastatin 10, metoprolol 25 mg twice daily, and heparin drip.  -Goal K>4 and mag>2    DLD  Cholesterol 245,  today. Patient has expressed allergy to had temporary coy inhibitors however will do a trial of crestor 10mg.      Uncontrolled diabetes  A1c today 9.  On metformin 500 mg, TRESIBA 26 units daily and glimepiride 4 mg at home.  -Started glargine 20 units, ISS, hypoglycemic protocol.  Held home medications.    Other chronic medical conditions:  Obesity-counseled with lifestyle modifications when appropriate  CHERIE-not compliant with CPAP  PVD-moderate arterial insufficiency per KWADWO in 3/2018    For further details , please refer to H&P by Dr. Napoles

## 2019-02-10 ENCOUNTER — APPOINTMENT (OUTPATIENT)
Dept: CARDIOLOGY | Facility: MEDICAL CENTER | Age: 74
DRG: 287 | End: 2019-02-10
Attending: NURSE PRACTITIONER
Payer: MEDICARE

## 2019-02-10 PROBLEM — I73.9 PERIPHERAL ARTERIAL DISEASE (HCC): Chronic | Status: ACTIVE | Noted: 2019-01-25

## 2019-02-10 LAB
ALBUMIN SERPL BCP-MCNC: 3.6 G/DL (ref 3.2–4.9)
ALBUMIN/GLOB SERPL: 1.3 G/DL
ALP SERPL-CCNC: 55 U/L (ref 30–99)
ALT SERPL-CCNC: 13 U/L (ref 2–50)
ANION GAP SERPL CALC-SCNC: 8 MMOL/L (ref 0–11.9)
APTT PPP: 52.6 SEC (ref 24.7–36)
AST SERPL-CCNC: 14 U/L (ref 12–45)
BASOPHILS # BLD AUTO: 0.3 % (ref 0–1.8)
BASOPHILS # BLD: 0.03 K/UL (ref 0–0.12)
BILIRUB SERPL-MCNC: 0.6 MG/DL (ref 0.1–1.5)
BUN SERPL-MCNC: 14 MG/DL (ref 8–22)
CALCIUM SERPL-MCNC: 8.2 MG/DL (ref 8.5–10.5)
CHLORIDE SERPL-SCNC: 106 MMOL/L (ref 96–112)
CO2 SERPL-SCNC: 21 MMOL/L (ref 20–33)
CREAT SERPL-MCNC: 0.88 MG/DL (ref 0.5–1.4)
EOSINOPHIL # BLD AUTO: 0.2 K/UL (ref 0–0.51)
EOSINOPHIL NFR BLD: 2.3 % (ref 0–6.9)
ERYTHROCYTE [DISTWIDTH] IN BLOOD BY AUTOMATED COUNT: 46.3 FL (ref 35.9–50)
GLOBULIN SER CALC-MCNC: 2.8 G/DL (ref 1.9–3.5)
GLUCOSE BLD-MCNC: 175 MG/DL (ref 65–99)
GLUCOSE BLD-MCNC: 184 MG/DL (ref 65–99)
GLUCOSE BLD-MCNC: 208 MG/DL (ref 65–99)
GLUCOSE BLD-MCNC: 221 MG/DL (ref 65–99)
GLUCOSE SERPL-MCNC: 155 MG/DL (ref 65–99)
HCT VFR BLD AUTO: 40.8 % (ref 42–52)
HGB BLD-MCNC: 14 G/DL (ref 14–18)
IMM GRANULOCYTES # BLD AUTO: 0.04 K/UL (ref 0–0.11)
IMM GRANULOCYTES NFR BLD AUTO: 0.5 % (ref 0–0.9)
LV EJECT FRACT  99904: 55
LV EJECT FRACT MOD 2C 99903: 57.67
LV EJECT FRACT MOD 4C 99902: 54.59
LV EJECT FRACT MOD BP 99901: 56.16
LYMPHOCYTES # BLD AUTO: 1.93 K/UL (ref 1–4.8)
LYMPHOCYTES NFR BLD: 22.1 % (ref 22–41)
MAGNESIUM SERPL-MCNC: 2.1 MG/DL (ref 1.5–2.5)
MCH RBC QN AUTO: 34.7 PG (ref 27–33)
MCHC RBC AUTO-ENTMCNC: 34.3 G/DL (ref 33.7–35.3)
MCV RBC AUTO: 101.2 FL (ref 81.4–97.8)
MONOCYTES # BLD AUTO: 0.91 K/UL (ref 0–0.85)
MONOCYTES NFR BLD AUTO: 10.4 % (ref 0–13.4)
NEUTROPHILS # BLD AUTO: 5.62 K/UL (ref 1.82–7.42)
NEUTROPHILS NFR BLD: 64.4 % (ref 44–72)
NRBC # BLD AUTO: 0 K/UL
NRBC BLD-RTO: 0 /100 WBC
PLATELET # BLD AUTO: 147 K/UL (ref 164–446)
PMV BLD AUTO: 11.3 FL (ref 9–12.9)
POTASSIUM SERPL-SCNC: 3.9 MMOL/L (ref 3.6–5.5)
PROT SERPL-MCNC: 6.4 G/DL (ref 6–8.2)
RBC # BLD AUTO: 4.03 M/UL (ref 4.7–6.1)
SODIUM SERPL-SCNC: 135 MMOL/L (ref 135–145)
WBC # BLD AUTO: 8.7 K/UL (ref 4.8–10.8)

## 2019-02-10 PROCEDURE — 770020 HCHG ROOM/CARE - TELE (206)

## 2019-02-10 PROCEDURE — 99233 SBSQ HOSP IP/OBS HIGH 50: CPT | Performed by: INTERNAL MEDICINE

## 2019-02-10 PROCEDURE — 83735 ASSAY OF MAGNESIUM: CPT

## 2019-02-10 PROCEDURE — 700111 HCHG RX REV CODE 636 W/ 250 OVERRIDE (IP): Performed by: INTERNAL MEDICINE

## 2019-02-10 PROCEDURE — 85025 COMPLETE CBC W/AUTO DIFF WBC: CPT

## 2019-02-10 PROCEDURE — 85730 THROMBOPLASTIN TIME PARTIAL: CPT

## 2019-02-10 PROCEDURE — 700102 HCHG RX REV CODE 250 W/ 637 OVERRIDE(OP): Performed by: INTERNAL MEDICINE

## 2019-02-10 PROCEDURE — 36415 COLL VENOUS BLD VENIPUNCTURE: CPT

## 2019-02-10 PROCEDURE — 82962 GLUCOSE BLOOD TEST: CPT | Mod: 91

## 2019-02-10 PROCEDURE — 99232 SBSQ HOSP IP/OBS MODERATE 35: CPT | Mod: GC | Performed by: INTERNAL MEDICINE

## 2019-02-10 PROCEDURE — 93306 TTE W/DOPPLER COMPLETE: CPT

## 2019-02-10 PROCEDURE — 93306 TTE W/DOPPLER COMPLETE: CPT | Mod: 26 | Performed by: INTERNAL MEDICINE

## 2019-02-10 PROCEDURE — A9270 NON-COVERED ITEM OR SERVICE: HCPCS | Performed by: INTERNAL MEDICINE

## 2019-02-10 PROCEDURE — 80053 COMPREHEN METABOLIC PANEL: CPT

## 2019-02-10 RX ORDER — POTASSIUM CHLORIDE 20 MEQ/1
20 TABLET, EXTENDED RELEASE ORAL ONCE
Status: DISPENSED | OUTPATIENT
Start: 2019-02-10 | End: 2019-02-11

## 2019-02-10 RX ORDER — LISINOPRIL 5 MG/1
5 TABLET ORAL
Status: DISCONTINUED | OUTPATIENT
Start: 2019-02-10 | End: 2019-02-11 | Stop reason: HOSPADM

## 2019-02-10 RX ORDER — LORAZEPAM 2 MG/ML
1 INJECTION INTRAMUSCULAR ONCE
Status: COMPLETED | OUTPATIENT
Start: 2019-02-10 | End: 2019-02-10

## 2019-02-10 RX ADMIN — INSULIN GLARGINE 20 UNITS: 100 INJECTION, SOLUTION SUBCUTANEOUS at 18:20

## 2019-02-10 RX ADMIN — METOPROLOL TARTRATE 25 MG: 25 TABLET, FILM COATED ORAL at 06:36

## 2019-02-10 RX ADMIN — ZOLPIDEM TARTRATE 5 MG: 5 TABLET ORAL at 01:54

## 2019-02-10 RX ADMIN — HEPARIN SODIUM 1350 UNITS/HR: 5000 INJECTION, SOLUTION INTRAVENOUS at 10:47

## 2019-02-10 RX ADMIN — HEPARIN SODIUM 3200 UNITS: 1000 INJECTION, SOLUTION INTRAVENOUS; SUBCUTANEOUS at 23:50

## 2019-02-10 RX ADMIN — LORAZEPAM 1 MG: 2 INJECTION INTRAMUSCULAR; INTRAVENOUS at 12:42

## 2019-02-10 RX ADMIN — ASPIRIN 81 MG 81 MG: 81 TABLET ORAL at 06:36

## 2019-02-10 RX ADMIN — METOPROLOL TARTRATE 25 MG: 25 TABLET, FILM COATED ORAL at 18:23

## 2019-02-10 ASSESSMENT — ENCOUNTER SYMPTOMS
VOMITING: 0
ORTHOPNEA: 0
DOUBLE VISION: 0
HEADACHES: 0
DEPRESSION: 0
TREMORS: 0
COUGH: 0
PND: 0
HEARTBURN: 0
NECK PAIN: 0
WHEEZING: 0
DIAPHORESIS: 0
CHOKING: 0
APNEA: 0
PALPITATIONS: 0
SHORTNESS OF BREATH: 0
TINGLING: 0
SPUTUM PRODUCTION: 0
BLURRED VISION: 0
MYALGIAS: 0
STRIDOR: 0
NAUSEA: 0
ABDOMINAL PAIN: 0
DIZZINESS: 0
CHEST TIGHTNESS: 0
FEVER: 0
CHILLS: 0

## 2019-02-10 NOTE — PROGRESS NOTES
"       Internal Medicine Interval Note  Note Author: Serena Vivas M.D.     Name Brijesh Brown 1945   Age/Sex 73 y.o. male   MRN [id number]   Code Status Full     After 5PM or if no immediate response to page, please call for cross-coverage  Attending/Team: Dr. Montana/ RHONDA Ac See Patient List for primary contact information  Call [phone] to page    1st Call - Day Intern (R1):   Dr. Napoles 2nd Call - Day Sr. Resident (R2/R3):   Dr. vivas         Reason for interval visit  (Principal Problem)   Unstable angina. Pending decision on CABG      Interval Problem Daily Status Update  (24 hours, problem oriented, brief subjective history, new lab/imaging data pertinent to that problem)   Overnight events: No acute events    Subjective: No chest pain, shortness of breath.  Patient, wife, daughter and son very anxious about the decision regarding CABG.     Interval events  -Dr. Dee had discussed with the family yesterday that he has diffuse stenosis and that his risk is high and he would discuss with Dr. Scott regarding further plan on Monday. However pt and family thought that cardiac surgery would talk to them today regarding CABG and they asked same questions repeatedly and patient is very eager to go home if there is no surgery.  Patient stated \"not a single person is giving hope regarding the surgery and looks like it is a complicated procedure.  I would rather go home with medical management and I will die when it is time\". He agreed to stay until discussion tomorrow.  Coordinated with cardiology TRACY Pinto regarding the plan. The plan is a conference between cardiac surgeons tomorrow regarding CABG.   -Continue heparin and oral medications for unstable angina.  - He is willing to walk around the hallway today.   - blood sugars under control  - ECHO done today, reading pending.     Review of Systems   Constitutional: Negative for chills, diaphoresis and fever.   HENT: Negative for " hearing loss and tinnitus.    Eyes: Negative for blurred vision and double vision.   Respiratory: Negative for cough, sputum production and shortness of breath.    Cardiovascular: Negative for chest pain, palpitations, orthopnea, leg swelling and PND.   Gastrointestinal: Negative for abdominal pain, heartburn, nausea and vomiting.   Genitourinary: Negative for dysuria and urgency.   Musculoskeletal: Negative for myalgias and neck pain.   Skin: Negative for itching and rash.   Neurological: Negative for dizziness, tingling, tremors and headaches.   Psychiatric/Behavioral: Negative for depression and suicidal ideas.       Disposition/Barriers to discharge:   Inpatient: pending decision on CABG    Consultants/Specialty  Cardiology: Dr Galan   Cardiac surgery: Dr Mckay    Quality Measures  Quality-Core Measures   Reviewed items::  EKG reviewed, Labs reviewed, Medications reviewed and Radiology images reviewed  Phelps catheter::  No Phelps  DVT prophylaxis pharmacological::  Heparin (Heparin drip for unstable angina)      Physical Exam       Vitals:    02/10/19 0400 02/10/19 0636 02/10/19 0800 02/10/19 1200   BP: 140/83  151/89 122/79   Pulse: 64 79 75 85   Resp: 18  16 16   Temp: 36.5 °C (97.7 °F)  36.7 °C (98.1 °F) 36.8 °C (98.2 °F)   TempSrc: Temporal  Temporal Temporal   SpO2: 92%  90% 90%   Weight:       Height:         Body mass index is 35.63 kg/m². Weight: 106.3 kg (234 lb 5.6 oz)  Oxygen Therapy:  Pulse Oximetry: 90 %, O2 (LPM): 0, O2 Delivery: None (Room Air)    Physical Exam   Constitutional: He is oriented to person, place, and time and well-developed, well-nourished, and in no distress.   HENT:   Head: Normocephalic and atraumatic.   Eyes: Conjunctivae are normal. Right eye exhibits no discharge. Left eye exhibits no discharge.   Neck: Normal range of motion. Neck supple.   Cardiovascular: Normal rate, regular rhythm, normal heart sounds and intact distal pulses.    No murmur heard.  Pulmonary/Chest:  Effort normal and breath sounds normal. No respiratory distress. He has no wheezes. He has no rales.   Abdominal: Soft. Bowel sounds are normal. He exhibits no distension. There is no tenderness. There is no rebound.   Musculoskeletal: Normal range of motion. He exhibits no edema or tenderness.   Neurological: He is alert and oriented to person, place, and time.   Skin: Skin is warm.   Blue-purple discoloration of L forearm. No hematoma or bleeding.    Psychiatric: Affect and judgment normal.     Assessment/Plan     * Unstable angina (HCC)   Assessment & Plan    Pt admitted for exertional and resting angina. Trop and EKG was WNL. H/o 3 stents in 2004. Follows with Dr ALTAGRACIA Waterman as outpaient.   S/p PCI on 02/08: High grade mid LAD, distal LAD, ostial LCX, Proximal LCX stenosis, diffuse RCA disease with patent stents in LAD and RCA.  BETTY score for UA : 4 points ( 20% risk at 14 days of all cause mortality)  Plan  - Cardiology and cardiac surgery following. Pending decision on CABG as pt is high risk for procedure. Likely discussion tomorrow.   - Continue heparin drip, ASA, Metoprolol and crestor.  - ECHO reading pending     Hyperlipidemia- (present on admission)   Assessment & Plan    Not on meds at home. H/o statin and PCKS9 inhibitors intolerance. Per last cardiology note : consider starting Zetia.  - 02/08: , , .   - Goal LDL for him will be < 70, he is willing to try statin. Trial of Crestor 10mg.   - Dietary modification counseling      CAD (coronary artery disease)- (present on admission)   Assessment & Plan    Prior LAD stenting x 3 in 2004  No h/o HF. Last Echo : EF 65%. Normal systolic function. Grade I diastolic dysfunction.   Having worsening exertional chest pain x 1 year, stress test 02/06/2019: positive with moderate ischemia in the inferior region.  Plan as stated in unstable angina       Type II diabetes mellitus (HCC)- (present on admission)   Assessment & Plan    At home  controlled with Glimiperide 4 mg BID, Metformin 1000 BID, and Insulin Deglutec 26 U (Last A1c 10/2015: 7.4)  , during hospital stay blood sugar <200.  - Oral meds on hold. On Lantus 12 U + ISS, hypoglycemia protocol and diabetic diet.     Benign essential HTN- (present on admission)   Assessment & Plan    Not on any home medications. Will continue to monitor.      Peripheral arterial disease (HCC)- (present on admission)   Assessment & Plan    Last KWADWO 3/2018: Moderate arterial insufficiency, not on any medications. No active claudication.  - On Aspirin     Statin intolerance- (present on admission)   Assessment & Plan    Has Myopathy with statins. However he needs to be on statin given recent cardiac symptoms and findings. He is willing to give it a try.  - Started Crestor 02/08

## 2019-02-10 NOTE — PROGRESS NOTES
Cardiology Follow Up Progress Note    Date of Service  2/10/2019    Attending Physician  Duane Montana M.D.    Reason for consultation     Abnormal myocardial PET scan demonstrating moderate ischemia, evaluation of chest pain, was sent from cardiology office to ER    History of     CAD with PCI to GARCIA, LAD, & RCA in 2004, diabetes mellitus, hypertension, hyperlipidemia, quit smoking in 1984, father with bypass surgery at age 56, embolic stroke, statin intolerance, kidney stone, depression, elevated PSA    Admitted for     Was sent from cardiology office to ER for evaluation of chest pain and abnormal myocardial PET scan 2/6/19 demonstrating moderate ischemia in the inferior wall    Interim Events    2/10/19, denies adamantly recurrent chest pain, no rhythm issues overnight, encouraged to ambulate , hypertensive this morning    Review of Systems  Review of Systems   Respiratory: Negative for apnea, cough, choking, chest tightness, shortness of breath, wheezing and stridor.    Cardiovascular: Negative for chest pain and leg swelling.       Vital signs in last 24 hours  Temp:  [36.5 °C (97.7 °F)-37.1 °C (98.7 °F)] 36.7 °C (98.1 °F)  Pulse:  [64-90] 75  Resp:  [16-18] 16  BP: (114-156)/(75-89) 151/89  SpO2:  [90 %-93 %] 90 %    Physical Exam  Physical Exam   Constitutional: He is oriented to person, place, and time.   HENT:   Head: Normocephalic.   Eyes: Conjunctivae are normal.   Neck: No JVD present. No thyromegaly present.   Pulmonary/Chest: He has no wheezes.   Abdominal: Soft.   Musculoskeletal: He exhibits no edema.   Neurological: He is alert and oriented to person, place, and time.   Skin: Skin is warm and dry.   Right radial cath site without evidence of hematoma, mild bruising, good circulation       Lab Review  Lab Results   Component Value Date/Time    WBC 8.7 02/10/2019 03:56 AM    RBC 4.03 (L) 02/10/2019 03:56 AM    HEMOGLOBIN 14.0 02/10/2019 03:56 AM    HEMATOCRIT 40.8 (L) 02/10/2019 03:56 AM     .2 (H) 02/10/2019 03:56 AM    MCH 34.7 (H) 02/10/2019 03:56 AM    MCHC 34.3 02/10/2019 03:56 AM    MPV 11.3 02/10/2019 03:56 AM      Lab Results   Component Value Date/Time    SODIUM 135 02/10/2019 03:56 AM    POTASSIUM 3.9 02/10/2019 03:56 AM    CHLORIDE 106 02/10/2019 03:56 AM    CO2 21 02/10/2019 03:56 AM    GLUCOSE 155 (H) 02/10/2019 03:56 AM    BUN 14 02/10/2019 03:56 AM    CREATININE 0.88 02/10/2019 03:56 AM    CREATININE 0.98 10/01/2013    BUNCREATRAT 18 06/26/2015 08:54 AM      Lab Results   Component Value Date/Time    ASTSGOT 14 02/10/2019 03:56 AM    ALTSGPT 13 02/10/2019 03:56 AM     Lab Results   Component Value Date/Time    CHOLSTRLTOT 245 (H) 02/08/2019 11:53 AM     (H) 02/08/2019 11:53 AM    HDL 42 02/08/2019 11:53 AM    TRIGLYCERIDE 121 02/08/2019 11:53 AM    TROPONINI <0.01 02/09/2019 10:42 AM       Recent Labs      02/08/19   1153   BNPBTYPENAT  4         Assessment/Plan    Unstable angina/abnormal cardiac PET scan 2/6/19  -Underwent left heart cath 2/8/19 demonstrating multivessel CAD (patent stent in the mid LAD, patent stent in diagonal, high-grade mid LAD stenosis, high-grade distal LAD stenosis, high-grade ostial circumflex stenosis, high-grade proximal left circumflex distal stenosis, patent stent in RCA)  -We will preserved ejection fraction by cardiac PET scan on 2/6/19, LVEF 60%  -CTS consult appreciated  -Continue with aspirin 81, IV heparin, metoprolol 25 mg twice daily  -Hypertensive this morning         Poorly controlled diabetes  -A1c 9  -On SSI and Lantus  consider adding ACE-I,       Obesity  -BMI 35  -Close outpatient follow-up with primary physician, we discussed therapeutic lifestyle changes      Dyslipidemia  -Intolerant to statins  -, total cholesterol 245  -Crestor 10 mg initiated      Obstructive sleep apnea  -not compliant with CPAP      Peripheral vascular disease with moderate arterial insufficiency per [facility] on 3/2018  continue ASA     Follow-up on  TTE  Ultrasound vein mapping 2/9/19 revealed small diameter greater saphenous veins less than 0.30 cm throughout except near origin of saphenofemoral junction  Ultrasound carotid 2/9/19 showed bilateral mild plaquing of the bifurcation less than 50% stenosis, high left proximal common carotid velocity associated with tortuosity and not vessel stenosis

## 2019-02-10 NOTE — PROGRESS NOTES
Cardiology Follow Up Progress Note    Date of Service  2/9/2019    Attending Physician  Duane Montana M.D.    Chief Complaint   Called to see patient in regards to concerns with chest pain and operative plan    HPI  Brijesh Brown is a 73 y.o. male admitted 2/8/2019 with a complex history of coronary disease and intolerance of a statin couched in reluctance to take it as well as concerns of vague side effects while on Repatha, poorly controlled diabetes and obesity    Sees me as an outpatient, his son is a long-standing  who worked at this hospital for some time.  Wife and daughter at bedside as well    Paged by the nurse for a second opinion about his chest pains  Seen by the surgeon today who is going to consult a different surgeon about the procedure  Also discussed with my partners who are both interventional cardiologist      Interim Events  Angiogram revealing surgical disease today  No pains has not been out of bed    Review of Systems  Review of Systems   Constitutional: Negative for chills and fever.   HENT: Negative for congestion.    Eyes: Negative for pain and discharge.   Respiratory: Negative for cough and wheezing.    Cardiovascular: Negative for chest pain and palpitations.   Gastrointestinal: Negative for abdominal pain, nausea and vomiting.   Musculoskeletal: Negative for myalgias.   Skin: Negative for rash.   Allergic/Immunologic: Negative for food allergies.   Neurological: Negative for dizziness and weakness.   Hematological: Does not bruise/bleed easily.   Psychiatric/Behavioral: Negative for self-injury. The patient is not nervous/anxious.    All other systems reviewed and are negative.      Vital signs in last 24 hours  Temp:  [36.3 °C (97.3 °F)-37.1 °C (98.7 °F)] 36.9 °C (98.5 °F)  Pulse:  [69-90] 90  Resp:  [17-18] 18  BP: (118-156)/(73-84) 156/84  SpO2:  [90 %-94 %] 90 %    Physical Exam  Physical Exam   Constitutional: He is oriented to person, place, and time. He appears  well-developed and well-nourished. No distress.   HENT:   Head: Normocephalic and atraumatic.   Mouth/Throat: Mucous membranes are normal.   Eyes: Conjunctivae and EOM are normal. No scleral icterus.   Neck: No JVD present. No thyroid mass and no thyromegaly present.   Cardiovascular: Normal rate, regular rhythm and intact distal pulses.    No murmur heard.  Pulmonary/Chest: Effort normal and breath sounds normal. No respiratory distress. He exhibits no tenderness.   Abdominal: Bowel sounds are normal. He exhibits no distension.   Musculoskeletal: Normal range of motion. He exhibits no edema.   Neurological: He is alert and oriented to person, place, and time. He has normal strength. He displays no tremor.   Skin: Skin is warm and dry. No rash noted. He is not diaphoretic.   Psychiatric: He has a normal mood and affect. His behavior is normal.   Vitals reviewed.      Lab Review  Lab Results   Component Value Date/Time    WBC 9.1 02/09/2019 12:03 AM    RBC 3.89 (L) 02/09/2019 12:03 AM    HEMOGLOBIN 13.3 (L) 02/09/2019 12:03 AM    HEMATOCRIT 39.0 (L) 02/09/2019 12:03 AM    .3 (H) 02/09/2019 12:03 AM    MCH 34.2 (H) 02/09/2019 12:03 AM    MCHC 34.1 02/09/2019 12:03 AM    MPV 11.2 02/09/2019 12:03 AM      Lab Results   Component Value Date/Time    SODIUM 137 02/09/2019 12:03 AM    POTASSIUM 3.9 02/09/2019 12:03 AM    CHLORIDE 106 02/09/2019 12:03 AM    CO2 22 02/09/2019 12:03 AM    GLUCOSE 230 (H) 02/09/2019 12:03 AM    BUN 18 02/09/2019 12:03 AM    CREATININE 0.83 02/09/2019 12:03 AM    CREATININE 0.98 10/01/2013    BUNCREATRAT 18 06/26/2015 08:54 AM      Lab Results   Component Value Date/Time    ASTSGOT 13 02/09/2019 12:03 AM    ALTSGPT 15 02/09/2019 12:03 AM     Lab Results   Component Value Date/Time    CHOLSTRLTOT 245 (H) 02/08/2019 11:53 AM     (H) 02/08/2019 11:53 AM    HDL 42 02/08/2019 11:53 AM    TRIGLYCERIDE 121 02/08/2019 11:53 AM    TROPONINI <0.01 02/09/2019 10:42 AM       Recent Labs       "02/08/19   1153   BNPBTYPENAT  4       Cardiac Imaging and Procedures Review  EKG:  My personal interpretation of the EKG dated yesterday normal sinus rhythm      Cardiac Catheterization: As above    Imaging  Chest X-Ray: Normal    Stress Test: Moderate inferior ischemia normal function PET scan    Assessment/Plan    I spent more than 60 minutes at bedside discussing his care plan  We talked about maximizing medication  We talked about getting out of bed to see if his \"chest discomfort\" is truly functional or anxiety.  His family has been quite concerned that the PET scan broke something loose because he has been only having chest discomfort since that time.  He endorses stable angina with walking his dog that has been unchanged.  His wife is very concerned and voices her opinion  Trial of Ativan    Reassurance given  Talked also about the option of medical therapy which there is a high risk proposition particularly if he is symptomatic.  If he is truly symptomatic with exertion or at rest, I made it quite clear to him that that as well as maximal medical therapy and weight loss will be his best plan.  He will be kept inpatient until this decision is made    Thank you for allowing me to participate in the care of this patient.      Please contact me with any questions.    Abida Waterman M.D.   Cardiologist, [facility]  [phone]      "

## 2019-02-10 NOTE — PROGRESS NOTES
Family is anxious to talk to Dr. Scott today about possibility of CABG to determine if it will be done or not as the patient and the family are understandably anxious for the patient to go back home. Family and patient has been under the impression that Dr. Scott would be here today to discuss recommendations. RN received call from Cardiology APRN Blair stating that Dr. Cordova clearly discussed with the family yesterday that Dr. Scott would not be available to discuss CABG possibility until Monday. Blair asked RN to update family. RN to update family.

## 2019-02-10 NOTE — PROGRESS NOTES
Pt stating he is feeling nauseous. No PRNs available. [facility] paged regarding orders for anti-nausea medication.

## 2019-02-10 NOTE — PROGRESS NOTES
Bedside report received from night nurse. Assumed care of pt. Pt awake, laying in bed. A/Ox4, VSS. No complaints at this time. POC reviewed and white board updated. Tele box on. Call light in reach. Bed locked in lowest position with 2 upper bed rails up.  Bed alarm on and plugged in and under pt correctly. Pt educated to call before getting out of bed. Heparin drip running at 1350 units/hour.

## 2019-02-10 NOTE — PROGRESS NOTES
Pt ambulated the entire hallway with stand-by assistance and denied any chest pain, dizziness, or shortness of breath. Pt tolerated ambulation well.

## 2019-02-10 NOTE — CARE PLAN
Problem: Safety  Goal: Will remain free from falls  Outcome: PROGRESSING AS EXPECTED  Pt educated to use call light before getting out of bed. Call light in reach. Bed locked in lowest position with 2 upper bed rails up. Pt encouraged to sit at edge of bed before standing up. No c/o dizziness. Assistance of one given to help pt to the bathroom and back to bed. Room floor is free from clutter. Treaded socks on pt. Bed alarm on and adequate lighting in room.      Problem: Knowledge Deficit  Goal: Knowledge of disease process/condition, treatment plan, diagnostic tests, and medications will improve    Intervention: Explain information regarding disease process/condition, treatment plan, diagnostic tests, and medications and document in education  Pt and family educated on plan of care, medications, pain management, and disease processes. Pt and family verbalized understanding and was encouraged to ask questions. All questions answered.

## 2019-02-10 NOTE — PROGRESS NOTES
Bedside report received from day shift RN, pt is resting in bed, family at the bedside, updated on POC and answered all questions, heparin gtt verified, call light and personal belongings in reach, fall interventions and hourly rounding in place, tele monitor in place.

## 2019-02-11 ENCOUNTER — PATIENT OUTREACH (OUTPATIENT)
Dept: HEALTH INFORMATION MANAGEMENT | Facility: OTHER | Age: 74
End: 2019-02-11

## 2019-02-11 VITALS
BODY MASS INDEX: 35.08 KG/M2 | RESPIRATION RATE: 18 BRPM | HEART RATE: 76 BPM | SYSTOLIC BLOOD PRESSURE: 125 MMHG | HEIGHT: 68 IN | TEMPERATURE: 97.2 F | WEIGHT: 231.48 LBS | DIASTOLIC BLOOD PRESSURE: 72 MMHG | OXYGEN SATURATION: 90 %

## 2019-02-11 LAB
ANION GAP SERPL CALC-SCNC: 7 MMOL/L (ref 0–11.9)
APTT PPP: 74.1 SEC (ref 24.7–36)
BASOPHILS # BLD AUTO: 0.3 % (ref 0–1.8)
BASOPHILS # BLD: 0.02 K/UL (ref 0–0.12)
BUN SERPL-MCNC: 17 MG/DL (ref 8–22)
CALCIUM SERPL-MCNC: 8.8 MG/DL (ref 8.5–10.5)
CHLORIDE SERPL-SCNC: 106 MMOL/L (ref 96–112)
CO2 SERPL-SCNC: 20 MMOL/L (ref 20–33)
CREAT SERPL-MCNC: 0.82 MG/DL (ref 0.5–1.4)
EOSINOPHIL # BLD AUTO: 0.23 K/UL (ref 0–0.51)
EOSINOPHIL NFR BLD: 2.9 % (ref 0–6.9)
ERYTHROCYTE [DISTWIDTH] IN BLOOD BY AUTOMATED COUNT: 45.3 FL (ref 35.9–50)
FOLATE SERPL-MCNC: 8.3 NG/ML
GLUCOSE BLD-MCNC: 222 MG/DL (ref 65–99)
GLUCOSE SERPL-MCNC: 191 MG/DL (ref 65–99)
HCT VFR BLD AUTO: 40.1 % (ref 42–52)
HGB BLD-MCNC: 13.6 G/DL (ref 14–18)
IMM GRANULOCYTES # BLD AUTO: 0.04 K/UL (ref 0–0.11)
IMM GRANULOCYTES NFR BLD AUTO: 0.5 % (ref 0–0.9)
LYMPHOCYTES # BLD AUTO: 1.56 K/UL (ref 1–4.8)
LYMPHOCYTES NFR BLD: 19.9 % (ref 22–41)
MAGNESIUM SERPL-MCNC: 1.9 MG/DL (ref 1.5–2.5)
MCH RBC QN AUTO: 34.2 PG (ref 27–33)
MCHC RBC AUTO-ENTMCNC: 33.9 G/DL (ref 33.7–35.3)
MCV RBC AUTO: 100.8 FL (ref 81.4–97.8)
MONOCYTES # BLD AUTO: 0.8 K/UL (ref 0–0.85)
MONOCYTES NFR BLD AUTO: 10.2 % (ref 0–13.4)
NEUTROPHILS # BLD AUTO: 5.17 K/UL (ref 1.82–7.42)
NEUTROPHILS NFR BLD: 66.2 % (ref 44–72)
NRBC # BLD AUTO: 0 K/UL
NRBC BLD-RTO: 0 /100 WBC
PLATELET # BLD AUTO: 154 K/UL (ref 164–446)
PMV BLD AUTO: 11 FL (ref 9–12.9)
POTASSIUM SERPL-SCNC: 3.8 MMOL/L (ref 3.6–5.5)
RBC # BLD AUTO: 3.98 M/UL (ref 4.7–6.1)
SODIUM SERPL-SCNC: 133 MMOL/L (ref 135–145)
VIT B12 SERPL-MCNC: 221 PG/ML (ref 211–911)
WBC # BLD AUTO: 7.8 K/UL (ref 4.8–10.8)

## 2019-02-11 PROCEDURE — 82962 GLUCOSE BLOOD TEST: CPT

## 2019-02-11 PROCEDURE — 80048 BASIC METABOLIC PNL TOTAL CA: CPT

## 2019-02-11 PROCEDURE — A9270 NON-COVERED ITEM OR SERVICE: HCPCS | Performed by: INTERNAL MEDICINE

## 2019-02-11 PROCEDURE — 85025 COMPLETE CBC W/AUTO DIFF WBC: CPT

## 2019-02-11 PROCEDURE — 99239 HOSP IP/OBS DSCHRG MGMT >30: CPT | Mod: GC | Performed by: INTERNAL MEDICINE

## 2019-02-11 PROCEDURE — 85730 THROMBOPLASTIN TIME PARTIAL: CPT

## 2019-02-11 PROCEDURE — 700102 HCHG RX REV CODE 250 W/ 637 OVERRIDE(OP): Performed by: INTERNAL MEDICINE

## 2019-02-11 PROCEDURE — 700111 HCHG RX REV CODE 636 W/ 250 OVERRIDE (IP): Performed by: INTERNAL MEDICINE

## 2019-02-11 PROCEDURE — 82746 ASSAY OF FOLIC ACID SERUM: CPT

## 2019-02-11 PROCEDURE — 83735 ASSAY OF MAGNESIUM: CPT

## 2019-02-11 PROCEDURE — 36415 COLL VENOUS BLD VENIPUNCTURE: CPT

## 2019-02-11 PROCEDURE — 82607 VITAMIN B-12: CPT

## 2019-02-11 RX ORDER — MAGNESIUM SULFATE HEPTAHYDRATE 40 MG/ML
2 INJECTION, SOLUTION INTRAVENOUS ONCE
Status: DISCONTINUED | OUTPATIENT
Start: 2019-02-11 | End: 2019-02-11 | Stop reason: HOSPADM

## 2019-02-11 RX ORDER — ROSUVASTATIN CALCIUM 10 MG/1
10 TABLET, COATED ORAL EVERY EVENING
Qty: 30 TAB | Refills: 0 | Status: SHIPPED | OUTPATIENT
Start: 2019-02-11 | End: 2019-02-19

## 2019-02-11 RX ORDER — POTASSIUM CHLORIDE 20 MEQ/1
40 TABLET, EXTENDED RELEASE ORAL ONCE
Status: DISCONTINUED | OUTPATIENT
Start: 2019-02-11 | End: 2019-02-11 | Stop reason: HOSPADM

## 2019-02-11 RX ORDER — LISINOPRIL 5 MG/1
5 TABLET ORAL DAILY
Qty: 30 TAB | Refills: 0 | Status: SHIPPED | OUTPATIENT
Start: 2019-02-12 | End: 2019-02-19

## 2019-02-11 RX ADMIN — ZOLPIDEM TARTRATE 5 MG: 5 TABLET ORAL at 01:34

## 2019-02-11 RX ADMIN — HEPARIN SODIUM 1500 UNITS/HR: 5000 INJECTION, SOLUTION INTRAVENOUS at 05:06

## 2019-02-11 RX ADMIN — ASPIRIN 81 MG 81 MG: 81 TABLET ORAL at 05:05

## 2019-02-11 RX ADMIN — METOPROLOL TARTRATE 25 MG: 25 TABLET, FILM COATED ORAL at 05:05

## 2019-02-11 NOTE — PROGRESS NOTES
Heparin drip stopped per order place by [facility]. Stopping verified with second RN. Pt educated to continue to call before getting out of bed as the heparin will still be in his system and could be potentially harmful if he were to fall. Pt verbalized understanding. Bed alarm check again and is plugged in appropriately and under pt correctly.

## 2019-02-11 NOTE — CARE PLAN
Problem: Safety  Goal: Will remain free from falls  Outcome: PROGRESSING AS EXPECTED  Patient educated on patient safety and fall precautions. Patient verbalized and demonstrated understanding of education. Patient will use call light for assistance.    Problem: Infection  Goal: Will remain free from infection  Outcome: PROGRESSING AS EXPECTED  Patient educated on patient safety and fall precautions. Patient verbalized and demonstrated understanding of education. Patient will use call light for assistance.

## 2019-02-11 NOTE — PROGRESS NOTES
Pt up to chair. Chair alarm placed and appropriately positioned under patient and appropriately plugged in. Pt educated to call before getting out of chair. Pt verbalized understanding.

## 2019-02-11 NOTE — PROGRESS NOTES
Bedside report received from night nurse. Assumed care of pt. Pt awake, laying in bed. A/Ox4, VSS. Wife at bedside. No complaints at this time. POC reviewed and white board updated. Tele box on. Call light in reach. Bed locked in lowest position with 2 upper bed rails up.  Bed alarm on and plugged in and under pt correctly. Pt educated to call before getting out of bed. Heparin drip running at 1500 units/hour. Rate verified with second RN.

## 2019-02-11 NOTE — DISCHARGE SUMMARY
Internal Medicine Discharge Summary  Note Author: Janet Napoles M.D.       Name Brijesh Brown 1945   Age/Sex 73 y.o. male   MRN [id number]         Admit Date:  2019       Discharge Date:   2019    Service:   [facility] Team  Attending Physician(s):   Dr. Montana      Senior Resident(s):   Dr. Leal  [place] Resident(s):   Dr. Napoles  PCP: Sheridan Jackman M.D.      Primary Diagnosis:   Unstable Angina    Secondary Diagnoses:                Principal Problem:    Unstable angina (HCC) POA: Unknown  Active Problems:    Type II diabetes mellitus (HCC) (Chronic) POA: Yes    CAD (coronary artery disease) (Chronic) POA: Yes      Overview: Has 3 stents     Hyperlipidemia (Chronic) POA: Yes    Benign essential HTN (Chronic) POA: Yes    Statin intolerance (Chronic) POA: Yes    Peripheral arterial disease (HCC) (Chronic) POA: Yes  Resolved Problems:    * No resolved hospital problems. *      Hospital Summary (Brief Narrative):       This is a 73 years old male with past medical history of coronary artery disease with prior stenting in RCA, LAD, LAD diagonal  in , diabetes mellitus type 2, lipidemia, hypertension, peripheral vascular disease (no current claudication) was sent from cardiology office on  for progressively worsening anginal chest pain up to the point that he was getting Chest pain while on rest. He had stress test done 2 days ago which was positive with moderate ischemia in the inferior region. EKG and Trop was negative, but due to the nature of the pain and co-morbidity he was taken to cath lab from ER which revealed High grade mid LAD, distal LAD, ostial LCX, Proximal LCX stenosis, diffuse RCA disease with patent stents in LAD and RCA. Echocardiogram revealed normal left ventricular function EF of 55%. He was started on heparin drip and Cardio surgery was consulted and it was decided that he is not an ideal candidate for CABG from anatomic  standpoint. Risk and benefits were discussed with the patient and family, they verbally understands. His chest pain improved during the hospital stay, he ambulated in the hallway without any discomfort.  Per Dr. Taylor no need of Plavix on discharge. Heparin drip was discontinued.     Patient /Hospital Summary (Details -- Problem Oriented) :          Unstable angina: As per hospital course. Continue Aspirin, Metoprolol, Crestor, Lisinopril. F/U with cardiology clinic on 02/19.   CAD: See Unstable Angina  Hyperlipidemia, statin intolerance: Patient on any medication prior to admission. . He has atypical reactions to statins. However it was dicussed with the patient the importance of him being on Statins at this point, they are agreeable to try. Continue Crestor for now.   DMT2: No changes were made on discharge. F/U with PCP for better control of sugars. HbA1c was 9.0 on admission. Added Lisinopril on discharge.   Essential HTN : Not on any home medication. BP within normal range during the hospital stay.   Peripheral arterial disease: Not in any acute exacerbation, continue aspirin  CHERIE: Not compliant with CPAP. No current complains.   Obesity: BMI 35. Lifestyle counseling done while inpatient. F/U PCP for nutrition referral.       Consultants:     Cardiology: Dr. Galan, Dr. Taylor  Cardio surgery: Dr. Cordova    Procedures:        Cardiac angiogram 02/08    Imaging/ Testing:      EC-ECHOCARDIOGRAM COMPLETE W/O CONT   Final Result      US-VEIN MAPPING LOWER EXTREMITY BILAT   Final Result      US-CAROTID DOPPLER BILAT   Final Result      DX-CHEST-LIMITED (1 VIEW)   Final Result      Bilateral lung base atelectasis.          Discharge Medications:         Medication Reconciliation: Completed       Medication List      START taking these medications      Instructions   lisinopril 5 MG Tabs  Start taking on:  2/12/2019  Commonly known as:  PRINIVIL   Doctor's comments:  Hold for BP <90/60  Take 1  Tab by mouth every day.  Dose:  5 mg     metoprolol 25 MG Tabs  Commonly known as:  LOPRESSOR   Doctor's comments:  Hold for SBP <90, HR <60  Take 1 Tab by mouth 2 Times a Day.  Dose:  25 mg     rosuvastatin 10 MG Tabs  Commonly known as:  CRESTOR   Take 1 Tab by mouth every evening.  Dose:  10 mg        CONTINUE taking these medications      Instructions   aspirin EC 81 MG Tbec  Commonly known as:  ECOTRIN   Take 1 Tab by mouth every day.  Dose:  81 mg     glimepiride 4 MG Tabs  Commonly known as:  AMARYL   Take 4 mg by mouth 2 times a day.  Dose:  4 mg     metFORMIN 500 MG Tabs  Commonly known as:  GLUCOPHAGE   Take 1,000 mg by mouth 2 times a day, with meals.  Dose:  1000 mg     TRESIBA FLEXTOUCH 200 UNIT/ML Sopn  Generic drug:  Insulin Degludec   Inject 26 Units as instructed every evening.  Dose:  26 Units     zolpidem 5 MG Tabs  Commonly known as:  AMBIEN   Take 5 mg by mouth at bedtime as needed for Sleep.  Dose:  5 mg            Disposition:   Home    Diet:  Diabetic diet    Activity:  Normal as tolerated. Avoid strenuous activities.     Instructions:        The patient was instructed to return to the ER in the event of worsening symptoms. I have counseled the patient on the importance of compliance and the patient has agreed to proceed with all medical recommendations and follow up plan indicated above.   The patient understands that all medications come with benefits and risks. Risks may include permanent injury or death and these risks can be minimized with close reassessment and monitoring.        Primary Care Provider:     Discharge summary faxed to primary care provider:  Deferred  Copy of discharge summary given to the patient: Deferred      Follow up appointment details :      Follow up with Cardiology on 02/19/19  Follow up with PCP on 02/25/19    Pending Studies:        None    Time spent on discharge day patient visit, preparing discharge paperwork and arranging for patient follow up.    Summary  of follow up issues:   Follow up on Blood pressure, blood sugar management    Discharge Time (Minutes) :    >45 minutes  Hospital Course Type:  Inpatient Stay >2 midnights      Condition on Discharge  Stable  ______________________________________________________________________    Interval history/exam for day of discharge:    No acute ovenight events. Patient ready to go home. Cardio surgery saw the patient this AM, not a candidate for CABG. Cardiology on call Dr. Taylor was contacted for discharge recommendation. Appreciate help.     Physical Exam:  Constitutional: He is oriented to person, place, and time and well-developed, well-nourished, and in no distress.   HENT:   Head: Normocephalic and atraumatic.   Eyes: Conjunctivae are normal. Right eye exhibits no discharge. Left eye exhibits no discharge.   Neck: Normal range of motion. Neck supple.   Cardiovascular: Normal rate, regular rhythm, normal heart sounds and intact distal pulses.    No murmur heard.  Pulmonary/Chest: Effort normal and breath sounds normal. No respiratory distress. He has no wheezes. He has no rales.   Abdominal: Soft. Bowel sounds are normal. He exhibits no distension. There is no tenderness. There is no rebound.   Musculoskeletal: Normal range of motion. He exhibits no edema or tenderness.   Neurological: He is alert and oriented to person, place, and time.   Skin: Skin is warm.   Blue-purple discoloration of L forearm. No hematoma or bleeding.    Psychiatric: Affect and judgment normal.     Most Recent Labs:    Lab Results   Component Value Date/Time    WBC 7.8 02/11/2019 05:45 AM    RBC 3.98 (L) 02/11/2019 05:45 AM    HEMOGLOBIN 13.6 (L) 02/11/2019 05:45 AM    HEMATOCRIT 40.1 (L) 02/11/2019 05:45 AM    .8 (H) 02/11/2019 05:45 AM    MCH 34.2 (H) 02/11/2019 05:45 AM    MCHC 33.9 02/11/2019 05:45 AM    MPV 11.0 02/11/2019 05:45 AM    NEUTSPOLYS 66.20 02/11/2019 05:45 AM    LYMPHOCYTES 19.90 (L) 02/11/2019 05:45 AM     MONOCYTES 10.20 02/11/2019 05:45 AM    EOSINOPHILS 2.90 02/11/2019 05:45 AM    BASOPHILS 0.30 02/11/2019 05:45 AM      Lab Results   Component Value Date/Time    SODIUM 133 (L) 02/11/2019 05:45 AM    POTASSIUM 3.8 02/11/2019 05:45 AM    CHLORIDE 106 02/11/2019 05:45 AM    CO2 20 02/11/2019 05:45 AM    GLUCOSE 191 (H) 02/11/2019 05:45 AM    BUN 17 02/11/2019 05:45 AM    CREATININE 0.82 02/11/2019 05:45 AM    CREATININE 0.98 10/01/2013    BUNCREATRAT 18 06/26/2015 08:54 AM      Lab Results   Component Value Date/Time    ALTSGPT 13 02/10/2019 03:56 AM    ASTSGOT 14 02/10/2019 03:56 AM    ALKPHOSPHAT 55 02/10/2019 03:56 AM    TBILIRUBIN 0.6 02/10/2019 03:56 AM    LIPASE 13 02/08/2019 11:53 AM    ALBUMIN 3.6 02/10/2019 03:56 AM    GLOBULIN 2.8 02/10/2019 03:56 AM    INR 0.96 02/08/2019 11:53 AM     Lab Results   Component Value Date/Time    PROTHROMBTM 12.9 02/08/2019 11:53 AM    INR 0.96 02/08/2019 11:53 AM

## 2019-02-11 NOTE — CARE PLAN
Problem: Safety  Goal: Will remain free from falls  Outcome: PROGRESSING AS EXPECTED  Pt educated to use call light before getting out of bed. Call light in reach. Bed locked in lowest position with 2 upper bed rails up. Pt encouraged to sit at edge of bed before standing up. No c/o dizziness. Stand by assistance given to help pt to the barthroom and back to bed. Room floor is free from clutter. Treaded socks on pt. Adequate lighting in room. Bed alarm on, plugged in correctly, and placed under pt correctly.

## 2019-02-11 NOTE — PROGRESS NOTES
Discharge instructions given and discussed, signed copy in chart. Pt verbalized understanding and all questions answered. All prescriptions reviewed. Patient is Alert & Oriented and discharged home in stable condition on room air via car escorted by wheelchair and family and staff. Personal belongings with patient. IV removed and tolerated well. Tele box removed, monitor room notified.

## 2019-02-11 NOTE — DISCHARGE INSTRUCTIONS
Discharge Instructions    Discharged to home by car with relative. Discharged via wheelchair, hospital escort: Yes.  Special equipment needed: Not Applicable    Be sure to schedule a follow-up appointment with your primary care doctor or any specialists as instructed.     Discharge Plan:   Diet Plan: Discussed  Activity Level: Discussed  Confirmed Follow up Appointment: Appointment Scheduled  Confirmed Symptoms Management: Discussed  Medication Reconciliation Updated: Yes  Influenza Vaccine Indication: Patient Refuses    I understand that a diet low in cholesterol, fat, and sodium is recommended for good health. Unless I have been given specific instructions below for another diet, I accept this instruction as my diet prescription.   Other diet: cardiac, diabetic    Special Instructions: Diagnosis:  Acute Coronary Syndrome (ACS) is a diagnosis that encompasses cardiac-related chest pain and heart attack. ACS occurs when the blood flow to the heart muscle is severely reduced or cut off completely due to a slow process called atherosclerosis.  Atherosclerosis is a disease in which the coronary arteries become narrow from a buildup of fat, cholesterol, and other substances that combine to form plaque. If the plaque breaks, a blood clot will form and block the blood flow to the heart muscle. This lack of blood flow can cause damage or death to the heart muscle which is called a heart attack or Myocardial Infarction (MI). There are two different types of MIs:  ST Elevation Myocardial Infarction or STEMI (the most severe type of heart attack) and Non-ST Elevation Myocardial Infarction or NSTEMI.    Treatment Plan:  · Cardiac Diet  - Low fat, low salt, low cholesterol   · Cardiac Rehab  - Your doctor has ordered you a referral to [facility].  Call [phone] to schedule an appointment.  · Attend my follow-up appointment with my Cardiologist.  · Take my medications as prescribed by my doctor  · Exercise daily  · Lower my  bad cholesterol and raise my good cholesterol    Medications:  Certain medications are used to treat ACS.  Remember to always take medications as prescribed and never stop talking medications unless told by your doctor.    You have been prescribed the following medicatons:    Aspirin - Aspirin is used as a blood thinning medication and you will require this medication indefinitely.  Beta-Blocker Metoprolol is used to lower blood pressure and heart rate, and/or helps your heart heal after a heart attack.  Statin Crestor is used to lower cholesterol.  Angiotensin Converting Enzyme Inhibitor Lisonopril is used to lower blood pressure and treat heart failure.        · Is patient discharged on Warfarin / Coumadin?   No       Acute Coronary Syndrome  Acute coronary syndrome (ACS) is a serious problem in which there is suddenly not enough blood and oxygen supplied to the heart. ACS may mean that one or more of the blood vessels in your heart (coronary arteries) may be blocked. ACS can result in chest pain or a heart attack (myocardial infarction or MI).  What are the causes?  This condition is caused by atherosclerosis, which is the buildup of fat and cholesterol (plaque) on the inside of the arteries. Over time, the plaque may narrow or block the artery, and this will lessen blood flow to the heart. Plaque can also become weak and break off within a coronary artery to form a clot and cause a sudden blockage.  What increases the risk?  The risk factors of this condition include:  · High cholesterol levels.  · High blood pressure (hypertension).  · Smoking.  · Diabetes.  · Age.  · Family history of chest pain, heart disease, or stroke.  · Lack of exercise.  What are the signs or symptoms?  The most common signs of this condition include:  · Chest pain, which can be:  ¨ A crushing or squeezing in the chest.  ¨ A tightness, pressure, fullness, or heaviness in the chest.  ¨ Present for more than a few minutes, or it can stop  and recur.  · Pain in the arms, neck, jaw, or back.  · Unexplained heartburn or indigestion.  · Shortness of breath.  · Nausea.  · Sudden cold sweats.  · Feeling light-headed or dizzy.  Sometimes, this condition has no symptoms.  How is this diagnosed?  ACS may be diagnosed through the following tests:  · Electrocardiogram (ECG).  · Blood tests.  · Coronary angiogram. This is a procedure to look at the coronary arteries to see if there is any blockage.  How is this treated?  Treatment for ACS may include:  · Healthy behavioral changes to reduce or control risk factors.  · Medicine.  · Coronary stenting. A stent helps to keep an artery open.  · Coronary angioplasty. This procedure widens a narrowed or blocked artery.  · Coronary artery bypass surgery. This will allow your blood to pass the blockage (bypass) to reach your heart.  Follow these instructions at home:  Eating and drinking  · Follow a heart-healthy diet. A dietitian can you help to educate you about healthy food options and changes.  · Use healthy cooking methods such as roasting, grilling, broiling, baking, poaching, steaming, or stir-frying. Talk to a dietitian to learn more about healthy cooking methods.  Medicines  · Take medicines only as directed by your health care provider.  · Do not take the following medicines unless your health care provider approves:  ¨ Nonsteroidal anti-inflammatory drugs (NSAIDs), such as ibuprofen, naproxen, or celecoxib.  ¨ Vitamin supplements that contain vitamin A, vitamin E, or both.  ¨ Hormone replacement therapy that contains estrogen with or without progestin.  · Stop illegal drug use.  Activity  · Follow an exercise program that is approved by your health care provider.  · Plan rest periods when you are fatigued.  Lifestyle  · Do not use any tobacco products, including cigarettes, chewing tobacco, or electronic cigarettes. If you need help quitting, ask your health care provider.  · If you drink alcohol, and your  health care provider approves, limit your alcohol intake to no more than 1 drink per day. One drink equals 12 ounces of beer, 5 ounces of wine, or 1½ ounces of hard liquor.  · Learn to manage stress.  · Maintain a healthy weight. Lose weight as approved by your health care provider.  General instructions  · Manage other health conditions, such as hypertension and diabetes, as directed by your health care provider.  · Keep all follow-up visits as directed by your health care provider. This is important.  · Your health care provider may ask you to monitor your blood pressure. A blood pressure reading consists of a higher number over a lower number, such as 110 over 72, written as 110/72. Ideally, your blood pressure should be:  ¨ Below 140/90 if you have no other medical conditions.  ¨ Below 130/80 if you have diabetes or kidney disease.  Get help right away if:  · You have pain in your chest, neck, arm, jaw, stomach, or back that lasts more than a few minutes, is recurring, or is not relieved by taking medicine under your tongue (sublingual nitroglycerin).  · You have profuse sweating without cause.  · You have unexplained:  ¨ Heartburn or indigestion.  ¨ Shortness of breath or difficulty breathing.  ¨ Nausea or vomiting.  ¨ Fatigue.  ¨ Feelings of nervousness or anxiety.  ¨ Weakness.  ¨ Diarrhea.  · You have sudden light-headedness or dizziness.  · You faint.  These symptoms may represent a serious problem that is an emergency. Do not wait to see if the symptoms will go away. Get medical help right away. Call your local emergency services (911 in the [place] Do not drive yourself to the clinic or hospital.   This information is not intended to replace advice given to you by your health care provider. Make sure you discuss any questions you have with your health care provider.  Document Released: 12/18/2006 Document Revised: 05/31/2017 Document Reviewed: 04/21/2015  [employer] Interactive Patient Education © 2017  [employer]      Metoprolol tablets  What is this medicine?  METOPROLOL (me TOE proe lole) is a beta-blocker. Beta-blockers reduce the workload on the heart and help it to beat more regularly. This medicine is used to treat high blood pressure and to prevent chest pain. It is also used to after a heart attack and to prevent an additional heart attack from occurring.  This medicine may be used for other purposes; ask your health care provider or pharmacist if you have questions.  COMMON BRAND NAME(S): Lopressor  What should I tell my health care provider before I take this medicine?  They need to know if you have any of these conditions:  -diabetes  -heart or vessel disease like slow heart rate, worsening heart failure, heart block, sick sinus syndrome or Raynaud's disease  -kidney disease  -liver disease  -lung or breathing disease, like asthma or emphysema  -pheochromocytoma  -thyroid disease  -an unusual or allergic reaction to metoprolol, other beta-blockers, medicines, foods, dyes, or preservatives  -pregnant or trying to get pregnant  -breast-feeding  How should I use this medicine?  Take this medicine by mouth with a drink of water. Follow the directions on the prescription label. Take this medicine immediately after meals. Take your doses at regular intervals. Do not take more medicine than directed. Do not stop taking this medicine suddenly. This could lead to serious heart-related effects.  Talk to your pediatrician regarding the use of this medicine in children. Special care may be needed.  Overdosage: If you think you have taken too much of this medicine contact a poison control center or emergency room at once.  NOTE: This medicine is only for you. Do not share this medicine with others.  What if I miss a dose?  If you miss a dose, take it as soon as you can. If it is almost time for your next dose, take only that dose. Do not take double or extra doses.  What may interact with this medicine?  This  medicine may interact with the following medications:  -certain medicines for blood pressure, heart disease, irregular heart beat  -certain medicines for depression like monoamine oxidase (MAO) inhibitors, fluoxetine, or paroxetine  -clonidine  -dobutamine  -epinephrine  -isoproterenol  -reserpine  This list may not describe all possible interactions. Give your health care provider a list of all the medicines, herbs, non-prescription drugs, or dietary supplements you use. Also tell them if you smoke, drink alcohol, or use illegal drugs. Some items may interact with your medicine.  What should I watch for while using this medicine?  Visit your doctor or health care professional for regular check ups. Contact your doctor right away if your symptoms worsen. Check your blood pressure and pulse rate regularly. Ask your health care professional what your blood pressure and pulse rate should be, and when you should contact them.  You may get drowsy or dizzy. Do not drive, use machinery, or do anything that needs mental alertness until you know how this medicine affects you. Do not sit or stand up quickly, especially if you are an older patient. This reduces the risk of dizzy or fainting spells. Contact your doctor if these symptoms continue. Alcohol may interfere with the effect of this medicine. Avoid alcoholic drinks.  What side effects may I notice from receiving this medicine?  Side effects that you should report to your doctor or health care professional as soon as possible:  -allergic reactions like skin rash, itching or hives  -cold or numb hands or feet  -depression  -difficulty breathing  -faint  -fever with sore throat  -irregular heartbeat, chest pain  -rapid weight gain  -swollen legs or ankles  Side effects that usually do not require medical attention (report to your doctor or health care professional if they continue or are bothersome):  -anxiety or nervousness  -change in sex drive or performance  -dry  skin  -headache  -nightmares or trouble sleeping  -short term memory loss  -stomach upset or diarrhea  -unusually tired  This list may not describe all possible side effects. Call your doctor for medical advice about side effects. You may report side effects to FDA at 3-515-FDA-1540.  Where should I keep my medicine?  Keep out of the reach of children.  Store at room temperature between 15 and 30 degrees C (59 and 86 degrees F). Throw away any unused medicine after the expiration date.  NOTE: This sheet is a summary. It may not cover all possible information. If you have questions about this medicine, talk to your doctor, pharmacist, or health care provider.  © 2018 [employer] (2014-08-22 14:40:36)          Lisinopril tablets  What is this medicine?  LISINOPRIL (lyse IN oh pril) is an ACE inhibitor. This medicine is used to treat high blood pressure and heart failure. It is also used to protect the heart immediately after a heart attack.  This medicine may be used for other purposes; ask your health care provider or pharmacist if you have questions.  COMMON BRAND NAME(S): Prinivil, Zestril  What should I tell my health care provider before I take this medicine?  They need to know if you have any of these conditions:  -diabetes  -heart or blood vessel disease  -kidney disease  -low blood pressure  -previous swelling of the tongue, face, or lips with difficulty breathing, difficulty swallowing, hoarseness, or tightening of the throat  -an unusual or allergic reaction to lisinopril, other ACE inhibitors, insect venom, foods, dyes, or preservatives  -pregnant or trying to get pregnant  -breast-feeding  How should I use this medicine?  Take this medicine by mouth with a glass of water. Follow the directions on your prescription label. You may take this medicine with or without food. If it upsets your stomach, take it with food. Take your medicine at regular intervals. Do not take it more often than directed.  Do not stop taking except on your doctor's advice.  Talk to your pediatrician regarding the use of this medicine in children. Special care may be needed. While this drug may be prescribed for children as young as 6 years of age for selected conditions, precautions do apply.  Overdosage: If you think you have taken too much of this medicine contact a poison control center or emergency room at once.  NOTE: This medicine is only for you. Do not share this medicine with others.  What if I miss a dose?  If you miss a dose, take it as soon as you can. If it is almost time for your next dose, take only that dose. Do not take double or extra doses.  What may interact with this medicine?  Do not take this medicine with any of the following medications:  -hymenoptera venom  -sacubitril; valsartan  This medicines may also interact with the following medications:  -aliskiren  -angiotensin receptor blockers, like losartan or valsartan  -certain medicines for diabetes  -diuretics  -everolimus  -gold compounds  -lithium  -NSAIDs, medicines for pain and inflammation, like ibuprofen or naproxen  -potassium salts or supplements  -salt substitutes  -sirolimus  -temsirolimus  This list may not describe all possible interactions. Give your health care provider a list of all the medicines, herbs, non-prescription drugs, or dietary supplements you use. Also tell them if you smoke, drink alcohol, or use illegal drugs. Some items may interact with your medicine.  What should I watch for while using this medicine?  Visit your doctor or health care professional for regular check ups. Check your blood pressure as directed. Ask your doctor what your blood pressure should be, and when you should contact him or her.  Do not treat yourself for coughs, colds, or pain while you are using this medicine without asking your doctor or health care professional for advice. Some ingredients may increase your blood pressure.  Women should inform their  doctor if they wish to become pregnant or think they might be pregnant. There is a potential for serious side effects to an unborn child. Talk to your health care professional or pharmacist for more information.  Check with your doctor or health care professional if you get an attack of severe diarrhea, nausea and vomiting, or if you sweat a lot. The loss of too much body fluid can make it dangerous for you to take this medicine.  You may get drowsy or dizzy. Do not drive, use machinery, or do anything that needs mental alertness until you know how this drug affects you. Do not stand or sit up quickly, especially if you are an older patient. This reduces the risk of dizzy or fainting spells. Alcohol can make you more drowsy and dizzy. Avoid alcoholic drinks.  Avoid salt substitutes unless you are told otherwise by your doctor or health care professional.  What side effects may I notice from receiving this medicine?  Side effects that you should report to your doctor or health care professional as soon as possible:  -allergic reactions like skin rash, itching or hives, swelling of the hands, feet, face, lips, throat, or tongue  -breathing problems  -signs and symptoms of kidney injury like trouble passing urine or change in the amount of urine  -signs and symptoms of increased potassium like muscle weakness; chest pain; or fast, irregular heartbeat  -signs and symptoms of liver injury like dark yellow or brown urine; general ill feeling or flu-like symptoms; light-colored stools; loss of appetite; nausea; right upper belly pain; unusually weak or tired; yellowing of the eyes or skin  -signs and symptoms of low blood pressure like dizziness; feeling faint or lightheaded, falls; unusually weak or tired  -stomach pain with or without nausea and vomiting  Side effects that usually do not require medical attention (report to your doctor or health care professional if they continue or are bothersome):  -changes in  taste  -cough  -dizziness  -fever  -headache  -sensitivity to light  This list may not describe all possible side effects. Call your doctor for medical advice about side effects. You may report side effects to FDA at 6-100-FDA-0701.  Where should I keep my medicine?  Keep out of the reach of children.  Store at room temperature between 15 and 30 degrees C (59 and 86 degrees F). Protect from moisture. Keep container tightly closed. Throw away any unused medicine after the expiration date.  NOTE: This sheet is a summary. It may not cover all possible information. If you have questions about this medicine, talk to your doctor, pharmacist, or health care provider.  © 2018 [employer] (2017-02-06 12:52:35)        Rosuvastatin Tablets  What is this medicine?  ROSUVASTATIN (william DANIELLA va sta tin) is known as a HMG-CoA reductase inhibitor or 'statin'. It lowers cholesterol and triglycerides in the blood. This drug may also reduce the risk of heart attack, stroke, or other health problems in patients with risk factors for heart disease. Diet and lifestyle changes are often used with this drug.  This medicine may be used for other purposes; ask your health care provider or pharmacist if you have questions.  COMMON BRAND NAME(S): Crestor  What should I tell my health care provider before I take this medicine?  They need to know if you have any of these conditions:  -frequently drink alcoholic beverages  -kidney disease  -liver disease  -muscle aches or weakness  -other medical condition  -an unusual or allergic reaction to rosuvastatin, other medicines, foods, dyes, or preservatives  -pregnant or trying to get pregnant  -breast-feeding  How should I use this medicine?  Take this medicine by mouth with a glass of water. Follow the directions on the prescription label. Do not cut, crush or chew this medicine. You can take this medicine with or without food. Take your doses at regular intervals. Do not take your medicine  more often than directed.  Talk to your pediatrician regarding the use of this medicine in children. While this drug may be prescribed for children as young as 7 years old for selected conditions, precautions do apply.  Overdosage: If you think you have taken too much of this medicine contact a poison control center or emergency room at once.  NOTE: This medicine is only for you. Do not share this medicine with others.  What if I miss a dose?  If you miss a dose, take it as soon as you can. Do not take 2 doses within 12 hours of each other. If there are less than 12 hours until your next dose, take only that dose. Do not take double or extra doses.  What may interact with this medicine?  Do not take this medicine with any of the following medications:  -herbal medicines like red yeast rice  This medicine may also interact with the following medications:  -alcohol  -antacids containing aluminum hydroxide or magnesium hydroxide  -cyclosporine  -other medicines for high cholesterol  -some medicines for HIV infection  -warfarin  This list may not describe all possible interactions. Give your health care provider a list of all the medicines, herbs, non-prescription drugs, or dietary supplements you use. Also tell them if you smoke, drink alcohol, or use illegal drugs. Some items may interact with your medicine.  What should I watch for while using this medicine?  Visit your doctor or health care professional for regular check-ups. You may need regular tests to make sure your liver is working properly.  Tell your doctor or health care professional right away if you get any unexplained muscle pain, tenderness, or weakness, especially if you also have a fever and tiredness. Your doctor or health care professional may tell you to stop taking this medicine if you develop muscle problems. If your muscle problems do not go away after stopping this medicine, contact your health care professional.  This medicine may affect blood  sugar levels. If you have diabetes, check with your doctor or health care professional before you change your diet or the dose of your diabetic medicine.  Avoid taking antacids containing aluminum, calcium or magnesium within 2 hours of taking this medicine.  This drug is only part of a total heart-health program. Your doctor or a dietician can suggest a low-cholesterol and low-fat diet to help. Avoid alcohol and smoking, and keep a proper exercise schedule.  Do not use this drug if you are pregnant or breast-feeding. Serious side effects to an unborn child or to an infant are possible. Talk to your doctor or pharmacist for more information.  What side effects may I notice from receiving this medicine?  Side effects that you should report to your doctor or health care professional as soon as possible:  -allergic reactions like skin rash, itching or hives, swelling of the face, lips, or tongue  -dark urine  -fever  -joint pain  -muscle cramps, pain  -redness, blistering, peeling or loosening of the skin, including inside the mouth  -trouble passing urine or change in the amount of urine  -unusually weak or tired  -yellowing of the eyes or skin  Side effects that usually do not require medical attention (report to your doctor or health care professional if they continue or are bothersome):  -constipation  -heartburn  -nausea  -stomach gas, pain, upset  This list may not describe all possible side effects. Call your doctor for medical advice about side effects. You may report side effects to FDA at 2-775-FDA-6196.  Where should I keep my medicine?  Keep out of the reach of children.  Store at room temperature between 20 and 25 degrees C (68 and 77 degrees F). Keep container tightly closed (protect from moisture). Throw away any unused medicine after the expiration date.  NOTE: This sheet is a summary. It may not cover all possible information. If you have questions about this medicine, talk to your doctor, pharmacist,  or health care provider.  © 2018 [employer] (2016-06-02 13:33:08)    Aspirin, ASA oral tablets  What is this medicine?  ASPIRIN (AS pir in) is a pain reliever. It is used to treat mild pain and fever. This medicine is also used as directed by a doctor to prevent and to treat heart attacks, to prevent strokes, and to treat arthritis or inflammation.  This medicine may be used for other purposes; ask your health care provider or pharmacist if you have questions.  COMMON BRAND NAME(S): Aspir-Low, Aspir-Indy, Aspirtab, Charley Advanced Aspirin, Charley Aspirin, Charley Aspirin Extra Strength, Charley Aspirin Plus, Charley Extra Strength, Charley Extra Strength Plus, Charley Genuine Aspirin, Charley Womens Aspirin, Bufferin, Bufferin Extra Strength, Bufferin Low Dose  What should I tell my health care provider before I take this medicine?  They need to know if you have any of these conditions:  -anemia  -asthma  -bleeding problems  -child with chickenpox, the flu, or other viral infection  -diabetes  -gout  -if you frequently drink alcohol containing drinks  -kidney disease  -liver disease  -low level of vitamin K  -lupus  -smoke tobacco  -stomach ulcers or other problems  -an unusual or allergic reaction to aspirin, tartrazine dye, other medicines, dyes, or preservatives  -pregnant or trying to get pregnant  -breast-feeding  How should I use this medicine?  Take this medicine by mouth with a glass of water. Follow the directions on the package or prescription label. You can take this medicine with or without food. If it upsets your stomach, take it with food. Do not take your medicine more often than directed.  Talk to your pediatrician regarding the use of this medicine in children. While this drug may be prescribed for children as young as 12 years of age for selected conditions, precautions do apply. Children and teenagers should not use this medicine to treat chicken pox or flu symptoms unless directed by a  doctor.  Patients over 65 years old may have a stronger reaction and need a smaller dose.  Overdosage: If you think you have taken too much of this medicine contact a poison control center or emergency room at once.  NOTE: This medicine is only for you. Do not share this medicine with others.  What if I miss a dose?  If you are taking this medicine on a regular schedule and miss a dose, take it as soon as you can. If it is almost time for your next dose, take only that dose. Do not take double or extra doses.  What may interact with this medicine?  Do not take this medicine with any of the following medications:  -cidofovir  -ketorolac  -probenecid  This medicine may also interact with the following medications:  -alcohol  -alendronate  -bismuth subsalicylate  -flavocoxid  -herbal supplements like feverfew, garlic, matthew, ginkgo biloba, horse chestnut  -medicines for diabetes or glaucoma like acetazolamide, methazolamide  -medicines for gout  -medicines that treat or prevent blood clots like enoxaparin, heparin, ticlopidine, warfarin  -other aspirin and aspirin-like medicines  -NSAIDs, medicines for pain and inflammation, like ibuprofen or naproxen  -pemetrexed  -sulfinpyrazone  -varicella live vaccine  This list may not describe all possible interactions. Give your health care provider a list of all the medicines, herbs, non-prescription drugs, or dietary supplements you use. Also tell them if you smoke, drink alcohol, or use illegal drugs. Some items may interact with your medicine.  What should I watch for while using this medicine?  If you are treating yourself for pain, tell your doctor or health care professional if the pain lasts more than 10 days, if it gets worse, or if there is a new or different kind of pain. Tell your doctor if you see redness or swelling. Also, check with your doctor if you have a fever that lasts for more than 3 days. Only take this medicine to prevent heart attacks or blood clotting  if prescribed by your doctor or health care professional.  Do not take aspirin or aspirin-like medicines with this medicine. Too much aspirin can be dangerous. Always read the labels carefully.  This medicine can irritate your stomach or cause bleeding problems. Do not smoke cigarettes or drink alcohol while taking this medicine. Do not lie down for 30 minutes after taking this medicine to prevent irritation to your throat.  If you are scheduled for any medical or dental procedure, tell your healthcare provider that you are taking this medicine. You may need to stop taking this medicine before the procedure.  This medicine may be used to treat migraines. If you take migraine medicines for 10 or more days a month, your migraines may get worse. Keep a diary of headache days and medicine use. Contact your healthcare professional if your migraine attacks occur more frequently.  What side effects may I notice from receiving this medicine?  Side effects that you should report to your doctor or health care professional as soon as possible:  -allergic reactions like skin rash, itching or hives, swelling of the face, lips, or tongue  -breathing problems  -changes in hearing, ringing in the ears  -confusion  -general ill feeling or flu-like symptoms  -pain on swallowing  -redness, blistering, peeling or loosening of the skin, including inside the mouth or nose  -signs and symptoms of bleeding such as bloody or black, tarry stools; red or dark-brown urine; spitting up blood or brown material that looks like coffee grounds; red spots on the skin; unusual bruising or bleeding from the eye, gums, or nose  -trouble passing urine or change in the amount of urine  -unusually weak or tired  -yellowing of the eyes or skin  Side effects that usually do not require medical attention (report to your doctor or health care professional if they continue or are bothersome):  -diarrhea or constipation  -headache  -nausea, vomiting  -stomach  gas, heartburn  This list may not describe all possible side effects. Call your doctor for medical advice about side effects. You may report side effects to FDA at 8-062-LIX-4224.  Where should I keep my medicine?  Keep out of the reach of children.  Store at room temperature between 15 and 30 degrees C (59 and 86 degrees F). Protect from heat and moisture. Do not use this medicine if it has a strong vinegar smell. Throw away any unused medicine after the expiration date.  NOTE: This sheet is a summary. It may not cover all possible information. If you have questions about this medicine, talk to your doctor, pharmacist, or health care provider.  © 2018 [employer]/Gold Standard (2014-08-19 11:30:31)      Depression / Suicide Risk    As you are discharged from this [facility] facility, it is important to learn how to keep safe from harming yourself.    Recognize the warning signs:  · Abrupt changes in personality, positive or negative- including increase in energy   · Giving away possessions  · Change in eating patterns- significant weight changes-  positive or negative  · Change in sleeping patterns- unable to sleep or sleeping all the time   · Unwillingness or inability to communicate  · Depression  · Unusual sadness, discouragement and loneliness  · Talk of wanting to die  · Neglect of personal appearance   · Rebelliousness- reckless behavior  · Withdrawal from people/activities they love  · Confusion- inability to concentrate     If you or a loved one observes any of these behaviors or has concerns about self-harm, here's what you can do:  · Talk about it- your feelings and reasons for harming yourself  · Remove any means that you might use to hurt yourself (examples: pills, rope, extension cords, firearm)  · Get professional help from the community (Mental Health, Substance Abuse, psychological counseling)  · Do not be alone:Call your Safe Contact- someone whom you trust who will be there for you.  · Call your  local CRISIS HOTLINE 886-9551 or 695-134-7467  · Call your local Children's Mobile Crisis Response Team [place] (074) 140-0566 or www.[employer]  · Call the toll free National Suicide Prevention Hotlines   · National Suicide Prevention Lifeline 820-692-LNZD (8585)  · National [employer] P[employer] Network 800-SUICIDE (856-9106)

## 2019-02-11 NOTE — PROGRESS NOTES
Assumed care of pt. Bedside report received from day R.N. Pt denies chest pain or SOB. VSS. Pt resting comfortably with family at bedside. Hep gtt at 1350u/hr. All needs met. Bed low and locked, call light in reach. Discussed POC.

## 2019-02-13 ENCOUNTER — TELEPHONE (OUTPATIENT)
Dept: CARDIOLOGY | Facility: MEDICAL CENTER | Age: 74
End: 2019-02-13

## 2019-02-13 NOTE — TELEPHONE ENCOUNTER
Explained recommendations with dtr. The dtr repeated instructions and states understanding. Discussed BP parameters in detail.     Of note, pt continues to remain chest pain free at this time per dtr. Plan is for daughter to call back if BP remains low or pt has concerning symptoms.

## 2019-02-13 NOTE — TELEPHONE ENCOUNTER
SOB, confusion, fatigue, weakness   Received: Today   Message Contents   Lorena Brown R.N.   Phone Number: [phone]             SC/chiquis     Pt's daughter Christina calling to report meds given to pt in hospital are causing concerns.  Pt has had SOB, confusion, fatigue, weakness, b/p 85/55 pulse 90 last evening.  Pt is somewhat better this morning, altho b/p was a bit higher.  Family is worried & will hold meds until you call.     Please call Christina [phone]      Lisinopril 5mg and metoprolol 25mg BID prescribed on discharge.     S/w pts dtr Christina who explains symptoms as described in note above: pt feeling weak, fatigued, confused yesterday after new meds. Patient was hypotensive wth above BP and pulse.     Feeling better this morning and has not taken any medications at this point. Waiting for recommendations on meds. BP this morning around 100/70.     To SC

## 2019-02-14 ENCOUNTER — TELEPHONE (OUTPATIENT)
Dept: CARDIOLOGY | Facility: MEDICAL CENTER | Age: 74
End: 2019-02-14

## 2019-02-19 ENCOUNTER — OFFICE VISIT (OUTPATIENT)
Dept: CARDIOLOGY | Facility: MEDICAL CENTER | Age: 74
End: 2019-02-19
Payer: MEDICARE

## 2019-02-19 VITALS
DIASTOLIC BLOOD PRESSURE: 64 MMHG | HEIGHT: 68 IN | HEART RATE: 74 BPM | OXYGEN SATURATION: 93 % | BODY MASS INDEX: 33.8 KG/M2 | WEIGHT: 223 LBS | SYSTOLIC BLOOD PRESSURE: 108 MMHG

## 2019-02-19 DIAGNOSIS — E66.3 OVERWEIGHT: Chronic | ICD-10-CM

## 2019-02-19 DIAGNOSIS — E11.8 TYPE 2 DIABETES MELLITUS WITH COMPLICATION, WITH LONG-TERM CURRENT USE OF INSULIN (HCC): Chronic | ICD-10-CM

## 2019-02-19 DIAGNOSIS — Z78.9 STATIN INTOLERANCE: Chronic | ICD-10-CM

## 2019-02-19 DIAGNOSIS — Z79.4 TYPE 2 DIABETES MELLITUS WITH COMPLICATION, WITH LONG-TERM CURRENT USE OF INSULIN (HCC): Chronic | ICD-10-CM

## 2019-02-19 DIAGNOSIS — Z95.5 H/O RIGHT CORONARY ARTERY STENT PLACEMENT: Chronic | ICD-10-CM

## 2019-02-19 DIAGNOSIS — I73.9 PERIPHERAL ARTERIAL DISEASE (HCC): Chronic | ICD-10-CM

## 2019-02-19 DIAGNOSIS — Z87.891 PERSONAL HISTORY OF TOBACCO USE: ICD-10-CM

## 2019-02-19 DIAGNOSIS — E78.2 MIXED HYPERLIPIDEMIA: Chronic | ICD-10-CM

## 2019-02-19 DIAGNOSIS — G47.33 OBSTRUCTIVE SLEEP APNEA: ICD-10-CM

## 2019-02-19 DIAGNOSIS — I10 BENIGN ESSENTIAL HTN: Chronic | ICD-10-CM

## 2019-02-19 DIAGNOSIS — I25.110 CORONARY ARTERY DISEASE INVOLVING NATIVE CORONARY ARTERY OF NATIVE HEART WITH UNSTABLE ANGINA PECTORIS (HCC): Chronic | ICD-10-CM

## 2019-02-19 PROBLEM — I20.0 UNSTABLE ANGINA (HCC): Status: RESOLVED | Noted: 2019-02-08 | Resolved: 2019-02-19

## 2019-02-19 PROCEDURE — 99214 OFFICE O/P EST MOD 30 MIN: CPT | Performed by: NURSE PRACTITIONER

## 2019-02-19 ASSESSMENT — ENCOUNTER SYMPTOMS
NAUSEA: 1
ABDOMINAL PAIN: 0
CLAUDICATION: 0
PND: 0
SHORTNESS OF BREATH: 0
MYALGIAS: 0
COUGH: 0
DIZZINESS: 0
ORTHOPNEA: 0
PALPITATIONS: 0
BACK PAIN: 1
FEVER: 0

## 2019-02-19 NOTE — PROGRESS NOTES
Chief Complaint   Patient presents with   • Coronary Artery Disease     Subjective:   Brijesh Brown is a 73 y.o. male who presents today for follow up on recent hospital stay for angina.    He is a patient of Dr. Abida Waterman in our office.    Hx of CAD with prior LAD stenting X3 in '04, diffuse disease (non-candidate for CABG or PCI), DM, obesity, HLD, PVD, and HTN.    He was recently sent from my office to ER for unstable angina, cath revealed diffuse disease, cardiac surgery consulted but found to be in-operable due to anatomical concerns. Medical management recommended.    He presents today with his wife. His wife is anxious about the hospital stay. He seems calm, although he did like having Ativan in the hospital to ease his spirits. He has a PCP apt soon to discuss diabetic management alongside anxiety/depression management.    He has no symptoms and his exertional/non-exertional chest pain has resolved now on metoprolol. He does have some tiredness with the metoprolol but after understanding the importance of this medication, he will remain on it.    His appetite is poor, he has lost 10 lbs non-purposely.     He has had no episodes of chest pain, palpitations, dizziness/lightheadedness, shortness of breath, orthopnea, or peripheral edema.    Past Medical History:   Diagnosis Date   • Benign essential HTN 4/23/2012   • CAD (coronary artery disease)     S/P NIKKI X 3 in '04   • CVA, old, disturbances of vision     right eye   • Depression 4/23/2012   • Diabetes (HCC)    • Myalgia    • Other and unspecified hyperlipidemia 12/20/2013   • Overweight(278.02) 4/23/2012   • PVD (peripheral vascular disease) ([facility]) 4/23/2012     Past Surgical History:   Procedure Laterality Date   • CARDIAC CATH     • INCISION HERNIA REPAIR       Family History   Problem Relation Age of Onset   • Lung Disease Mother    • Heart Disease Father         CABG x3, then re-do CABG x 4   • Diabetes Sister    • Heart Disease Sister       Social History     Social History   • Marital status:      Spouse name: N/A   • Number of children: N/A   • Years of education: N/A     Occupational History   • Not on file.     Social History Main Topics   • Smoking status: Former Smoker     Packs/day: 1.00     Years: 20.00     Types: Cigarettes     Quit date: 1/1/1984   • Smokeless tobacco: Never Used      Comment: quit '84, 20 py history   • Alcohol use No   • Drug use: Unknown   • Sexual activity: Not on file      Comment:  48 years     Other Topics Concern   • Not on file     Social History Narrative   • No narrative on file     Allergies   Allergen Reactions   • Statins [Hmg-Coa-R Inhibitors]      Caused muscle pain     Outpatient Encounter Prescriptions as of 2/19/2019   Medication Sig Dispense Refill   • Evolocumab (REPATHA) 140 MG/ML Solution Prefilled Syringe Inject 140 mg as instructed every 14 days. 2 Syringe 0   • metoprolol (LOPRESSOR) 25 MG Tab Take 1 Tab by mouth 2 Times a Day. 60 Tab 0   • metFORMIN (GLUCOPHAGE) 500 MG Tab Take 1,000 mg by mouth 2 times a day, with meals.     • zolpidem (AMBIEN) 5 MG Tab Take 5 mg by mouth at bedtime as needed for Sleep.     • aspirin EC (ECOTRIN) 81 MG Tablet Delayed Response Take 1 Tab by mouth every day. 30 Tab 11   • Insulin Degludec (TRESIBA FLEXTOUCH) 200 UNIT/ML Solution Pen-injector Inject 26 Units as instructed every evening.     • [DISCONTINUED] ONE TOUCH ULTRA TEST strip U TO TEST BLOOD SUGAR UP TO QID UTD  6   • [DISCONTINUED] lisinopril (PRINIVIL) 5 MG Tab Take 1 Tab by mouth every day. (Patient not taking: Reported on 2/19/2019) 30 Tab 0   • [DISCONTINUED] rosuvastatin (CRESTOR) 10 MG Tab Take 1 Tab by mouth every evening. (Patient not taking: Reported on 2/19/2019) 30 Tab 0   • [DISCONTINUED] glimepiride (AMARYL) 4 MG Tab Take 4 mg by mouth 2 times a day.       No facility-administered encounter medications on file as of 2/19/2019.      Review of Systems   Constitutional: Negative  "for fever and malaise/fatigue.   Respiratory: Negative for cough and shortness of breath.    Cardiovascular: Positive for chest pain. Negative for palpitations, orthopnea, claudication, leg swelling and PND.   Gastrointestinal: Positive for nausea. Negative for abdominal pain.   Musculoskeletal: Positive for back pain. Negative for myalgias.   Neurological: Negative for dizziness.        Objective:   /64 (BP Location: Right arm, Patient Position: Sitting, BP Cuff Size: Adult)   Pulse 74   Ht 1.727 m (5' 8\")   Wt 101.2 kg (223 lb)   SpO2 93%   BMI 33.91 kg/m²     Physical Exam   Constitutional: He appears well-developed.   Central obesity   HENT:   Head: Normocephalic and atraumatic.   Eyes: EOM are normal.   Neck: No JVD present.   Cardiovascular: Normal rate, regular rhythm, normal heart sounds and intact distal pulses.    Pulmonary/Chest: Effort normal and breath sounds normal.   Musculoskeletal: Normal range of motion. He exhibits no edema.   Skin: Skin is warm and dry.   Psychiatric: He has a normal mood and affect.   Nursing note and vitals reviewed.      Assessment:     1. Coronary artery disease involving native coronary artery of native heart with unstable angina pectoris (HCC)  REFERRAL TO LIPID CLINIC    REFERRAL TO INTENSIVE CARDIAC REHAB/CARDIAC REHAB   2. Mixed hyperlipidemia  REFERRAL TO LIPID CLINIC    LIPID PANEL    Comp Metabolic Panel    REFERRAL TO INTENSIVE CARDIAC REHAB/CARDIAC REHAB   3. Type 2 diabetes mellitus with complication, with long-term current use of insulin (HCC)  REFERRAL TO INTENSIVE CARDIAC REHAB/CARDIAC REHAB   4. Benign essential HTN  REFERRAL TO INTENSIVE CARDIAC REHAB/CARDIAC REHAB   5. Overweight  REFERRAL TO INTENSIVE CARDIAC REHAB/CARDIAC REHAB   6. Personal history of tobacco use  REFERRAL TO INTENSIVE CARDIAC REHAB/CARDIAC REHAB   7. Peripheral arterial disease (HCC)  REFERRAL TO INTENSIVE CARDIAC REHAB/CARDIAC REHAB   8. H/O right coronary artery stent " placement  REFERRAL TO INTENSIVE CARDIAC REHAB/CARDIAC REHAB   9. Obstructive sleep apnea  REFERRAL TO INTENSIVE CARDIAC REHAB/CARDIAC REHAB   10. Statin intolerance  REFERRAL TO INTENSIVE CARDIAC REHAB/CARDIAC REHAB     Medical Decision Making:  Today's Assessment / Status / Plan:     1. CAD, prior stents X3 to LAD in '04, now with in-operable complex diffuse disease  -no angina or MAGAÑA  -fatigue/tiredness with metoprolol  -cont asa, bb, start repatha with lipid clinic  -referral to lipid clinic placed  -dietary education given  -diabetic management per PCP  -refer to cardiac rehab for further education/exercise if able  -follow symptoms clinically    2. HTN  -good control on metoprolol  -had to stop ace due to hypotension in 70's systolic at home    3. HLD  -unmanaged as patient is intolerant to statins and PCSK9 inhibitors in past  -he is willing to try Repatha again  -referred to lipid clinic  -no samples of repatha at this time    4. DM  -managed by PCP    5. Obesity  -needs weight loss regimen  -cardiac rehab if able, if not refer to [facility]    6. PAD  -no claudication  -cont asa, needs cholesterol management  -follow clinically    7. Prior smoker  -now cessation for >30 years  -20 pack years    FU in clinic in 3 months with LA with repeat labs    Patient verbalizes understanding and agrees with the plan of care.     Collaborating MD: Ronan FENG

## 2019-02-19 NOTE — LETTER
[facility]   [address]  [place] [address]  Phone: [phone]  Fax: [phone]              Brijesh Brown  1945    Encounter Date: 2/19/2019    KAI Holt          PROGRESS NOTE:  Chief Complaint   Patient presents with   • Coronary Artery Disease     Subjective:   Brijesh Brown is a 73 y.o. male who presents today for follow up on recent hospital stay for angina.    He is a patient of Dr. Abida Waterman in our office.    Hx of CAD with prior LAD stenting X3 in '04, diffuse disease (non-candidate for CABG or PCI), DM, obesity, HLD, PVD, and HTN.    He was recently sent from my office to ER for unstable angina, cath revealed diffuse disease, cardiac surgery consulted but found to be in-operable due to anatomical concerns. Medical management recommended.    He presents today with his wife. His wife is anxious about the hospital stay. He seems calm, although he did like having Ativan in the hospital to ease his spirits. He has a PCP apt soon to discuss diabetic management alongside anxiety/depression management.    He has no symptoms and his exertional/non-exertional chest pain has resolved now on metoprolol. He does have some tiredness with the metoprolol but after understanding the importance of this medication, he will remain on it.    His appetite is poor, he has lost 10 lbs non-purposely.     He has had no episodes of chest pain, palpitations, dizziness/lightheadedness, shortness of breath, orthopnea, or peripheral edema.    Past Medical History:   Diagnosis Date   • Benign essential HTN 4/23/2012   • CAD (coronary artery disease)     S/P NIKKI X 3 in '04   • CVA, old, disturbances of vision     right eye   • Depression 4/23/2012   • Diabetes (HCC)    • Myalgia    • Other and unspecified hyperlipidemia 12/20/2013   • Overweight(278.02) 4/23/2012   • PVD (peripheral vascular disease) (HCC) 4/23/2012     Past Surgical  History:   Procedure Laterality Date   • CARDIAC CATH     • INCISION HERNIA REPAIR       Family History   Problem Relation Age of Onset   • Lung Disease Mother    • Heart Disease Father         CABG x3, then re-do CABG x 4   • Diabetes Sister    • Heart Disease Sister      Social History     Social History   • Marital status:      Spouse name: N/A   • Number of children: N/A   • Years of education: N/A     Occupational History   • Not on file.     Social History Main Topics   • Smoking status: Former Smoker     Packs/day: 1.00     Years: 20.00     Types: Cigarettes     Quit date: 1/1/1984   • Smokeless tobacco: Never Used      Comment: quit '84, 20 py history   • Alcohol use No   • Drug use: Unknown   • Sexual activity: Not on file      Comment:  48 years     Other Topics Concern   • Not on file     Social History Narrative   • No narrative on file     Allergies   Allergen Reactions   • Statins [Hmg-Coa-R Inhibitors]      Caused muscle pain     Outpatient Encounter Prescriptions as of 2/19/2019   Medication Sig Dispense Refill   • Evolocumab (REPATHA) 140 MG/ML Solution Prefilled Syringe Inject 140 mg as instructed every 14 days. 2 Syringe 0   • metoprolol (LOPRESSOR) 25 MG Tab Take 1 Tab by mouth 2 Times a Day. 60 Tab 0   • metFORMIN (GLUCOPHAGE) 500 MG Tab Take 1,000 mg by mouth 2 times a day, with meals.     • zolpidem (AMBIEN) 5 MG Tab Take 5 mg by mouth at bedtime as needed for Sleep.     • aspirin EC (ECOTRIN) 81 MG Tablet Delayed Response Take 1 Tab by mouth every day. 30 Tab 11   • Insulin Degludec (TRESIBA FLEXTOUCH) 200 UNIT/ML Solution Pen-injector Inject 26 Units as instructed every evening.     • [DISCONTINUED] ONE TOUCH ULTRA TEST strip U TO TEST BLOOD SUGAR UP TO QID UTD  6   • [DISCONTINUED] lisinopril (PRINIVIL) 5 MG Tab Take 1 Tab by mouth every day. (Patient not taking: Reported on 2/19/2019) 30 Tab 0   • [DISCONTINUED] rosuvastatin (CRESTOR) 10 MG Tab Take 1 Tab by mouth every  "evening. (Patient not taking: Reported on 2/19/2019) 30 Tab 0   • [DISCONTINUED] glimepiride (AMARYL) 4 MG Tab Take 4 mg by mouth 2 times a day.       No facility-administered encounter medications on file as of 2/19/2019.      Review of Systems   Constitutional: Negative for fever and malaise/fatigue.   Respiratory: Negative for cough and shortness of breath.    Cardiovascular: Positive for chest pain. Negative for palpitations, orthopnea, claudication, leg swelling and PND.   Gastrointestinal: Positive for nausea. Negative for abdominal pain.   Musculoskeletal: Positive for back pain. Negative for myalgias.   Neurological: Negative for dizziness.        Objective:   /64 (BP Location: Right arm, Patient Position: Sitting, BP Cuff Size: Adult)   Pulse 74   Ht 1.727 m (5' 8\")   Wt 101.2 kg (223 lb)   SpO2 93%   BMI 33.91 kg/m²      Physical Exam   Constitutional: He appears well-developed.   Central obesity   HENT:   Head: Normocephalic and atraumatic.   Eyes: EOM are normal.   Neck: No JVD present.   Cardiovascular: Normal rate, regular rhythm, normal heart sounds and intact distal pulses.    Pulmonary/Chest: Effort normal and breath sounds normal.   Musculoskeletal: Normal range of motion. He exhibits no edema.   Skin: Skin is warm and dry.   Psychiatric: He has a normal mood and affect.   Nursing note and vitals reviewed.      Assessment:     1. Coronary artery disease involving native coronary artery of native heart with unstable angina pectoris (HCC)  REFERRAL TO LIPID CLINIC    REFERRAL TO INTENSIVE CARDIAC REHAB/CARDIAC REHAB   2. Mixed hyperlipidemia  REFERRAL TO LIPID CLINIC    LIPID PANEL    Comp Metabolic Panel    REFERRAL TO INTENSIVE CARDIAC REHAB/CARDIAC REHAB   3. Type 2 diabetes mellitus with complication, with long-term current use of insulin (HCC)  REFERRAL TO INTENSIVE CARDIAC REHAB/CARDIAC REHAB   4. Benign essential HTN  REFERRAL TO INTENSIVE CARDIAC REHAB/CARDIAC REHAB   5. Overweight "  REFERRAL TO INTENSIVE CARDIAC REHAB/CARDIAC REHAB   6. Personal history of tobacco use  REFERRAL TO INTENSIVE CARDIAC REHAB/CARDIAC REHAB   7. Peripheral arterial disease (HCC)  REFERRAL TO INTENSIVE CARDIAC REHAB/CARDIAC REHAB   8. H/O right coronary artery stent placement  REFERRAL TO INTENSIVE CARDIAC REHAB/CARDIAC REHAB   9. Obstructive sleep apnea  REFERRAL TO INTENSIVE CARDIAC REHAB/CARDIAC REHAB   10. Statin intolerance  REFERRAL TO INTENSIVE CARDIAC REHAB/CARDIAC REHAB     Medical Decision Making:  Today's Assessment / Status / Plan:     1. CAD, prior stents X3 to LAD in '04, now with in-operable complex diffuse disease  -no angina or MAGAÑA  -fatigue/tiredness with metoprolol  -cont asa, bb, start repatha with lipid clinic  -referral to lipid clinic placed  -dietary education given  -diabetic management per PCP  -refer to cardiac rehab for further education/exercise if able  -follow symptoms clinically    2. HTN  -good control on metoprolol  -had to stop ace due to hypotension in 70's systolic at home    3. HLD  -unmanaged as patient is intolerant to statins and PCSK9 inhibitors in past  -he is willing to try Repatha again  -referred to lipid clinic  -no samples of repatha at this time    4. DM  -managed by PCP    5. Obesity  -needs weight loss regimen  -cardiac rehab if able, if not refer to [facility] [email or web address] [facility]    6. PAD  -no claudication  -cont asa, needs cholesterol management  -follow clinically    7. Prior smoker  -now cessation for >30 years  -20 pack years    FU in clinic in 3 months with LA with repeat labs    Patient verbalizes understanding and agrees with the plan of care.     Collaborating MD: Ronan FENG        No Recipients

## 2019-02-22 ENCOUNTER — TELEPHONE (OUTPATIENT)
Dept: CARDIOLOGY | Facility: MEDICAL CENTER | Age: 74
End: 2019-02-22

## 2019-02-23 NOTE — TELEPHONE ENCOUNTER
RE: wife calling about Repatha Samples   Received: Today   Message Contents   DIA Holt.  Roxane Brown R.N.             I was told by Coni that samples are not an option anymore for repatha. So they will have to wait until their apt with Dr. Bloch to discuss management. At this point, there is nothing more we can do if there is no samples available. SC    Previous Messages      ----- Message -----   From: Roxane Brown R.N.   Sent: 2/22/2019   9:52 AM   To: KAI Holt   Subject: FW: wife calling about Repatha Samples           Oh goodness, what would you like to do   ----- Message -----   From: Dasia Quiroga   Sent: 2/22/2019   9:34 AM   To: Roxane Brown R.N.   Subject: wife calling about Repatha Samples               [place]       Patient's wife Patti called to speak to you. She said [facility] doesn't have any Repatha samples and to call Dr. Bloch's office. She was told can't give him samples until his appt in a month. Patti said SC said they could get them from the [facility] on Tuesday. [facility]id Clinic said if [facility] wants him to get them on Tuesday than SC needs to call them and tell them that. Patti can be reached at [phone].           Notified pt and wife of the above per Rosmery MOLINA. Reassured wife that its okay to wait 1 month to restart cholesterol management.  Wife states she is okay with this.

## 2019-03-01 NOTE — ADDENDUM NOTE
Encounter addended by: Sharon Cordova M.D. on: 3/1/2019 12:24 PM<BR>    Actions taken: Sign clinical note

## 2019-03-18 ENCOUNTER — HOSPITAL ENCOUNTER (OUTPATIENT)
Facility: MEDICAL CENTER | Age: 74
End: 2019-03-18
Attending: EMERGENCY MEDICINE | Admitting: HOSPITALIST
Payer: MEDICARE

## 2019-03-18 ENCOUNTER — APPOINTMENT (OUTPATIENT)
Dept: RADIOLOGY | Facility: MEDICAL CENTER | Age: 74
End: 2019-03-18
Attending: EMERGENCY MEDICINE
Payer: MEDICARE

## 2019-03-18 ENCOUNTER — TELEPHONE (OUTPATIENT)
Dept: CARDIOLOGY | Facility: MEDICAL CENTER | Age: 74
End: 2019-03-18

## 2019-03-18 VITALS
DIASTOLIC BLOOD PRESSURE: 70 MMHG | TEMPERATURE: 98.2 F | WEIGHT: 227.07 LBS | SYSTOLIC BLOOD PRESSURE: 143 MMHG | HEIGHT: 68 IN | RESPIRATION RATE: 15 BRPM | OXYGEN SATURATION: 93 % | HEART RATE: 78 BPM | BODY MASS INDEX: 34.41 KG/M2

## 2019-03-18 DIAGNOSIS — I73.9 PERIPHERAL ARTERIAL DISEASE (HCC): Chronic | ICD-10-CM

## 2019-03-18 DIAGNOSIS — E11.8 TYPE 2 DIABETES MELLITUS WITH COMPLICATION, WITH LONG-TERM CURRENT USE OF INSULIN (HCC): Chronic | ICD-10-CM

## 2019-03-18 DIAGNOSIS — R07.9 ACUTE CHEST PAIN: ICD-10-CM

## 2019-03-18 DIAGNOSIS — I10 BENIGN ESSENTIAL HTN: Chronic | ICD-10-CM

## 2019-03-18 DIAGNOSIS — I25.110 CORONARY ARTERY DISEASE INVOLVING NATIVE CORONARY ARTERY OF NATIVE HEART WITH UNSTABLE ANGINA PECTORIS (HCC): Chronic | ICD-10-CM

## 2019-03-18 DIAGNOSIS — I20.89 ANGINA EFFORT: ICD-10-CM

## 2019-03-18 DIAGNOSIS — Z79.4 TYPE 2 DIABETES MELLITUS WITH COMPLICATION, WITH LONG-TERM CURRENT USE OF INSULIN (HCC): Chronic | ICD-10-CM

## 2019-03-18 DIAGNOSIS — G47.33 OBSTRUCTIVE SLEEP APNEA: ICD-10-CM

## 2019-03-18 DIAGNOSIS — I20.89 ANGINA AT REST: ICD-10-CM

## 2019-03-18 PROBLEM — I20.9 ANGINA PECTORIS (HCC): Status: ACTIVE | Noted: 2019-03-18

## 2019-03-18 LAB
ALBUMIN SERPL BCP-MCNC: 4.6 G/DL (ref 3.2–4.9)
ALBUMIN/GLOB SERPL: 1.5 G/DL
ALP SERPL-CCNC: 59 U/L (ref 30–99)
ALT SERPL-CCNC: 24 U/L (ref 2–50)
ANION GAP SERPL CALC-SCNC: 10 MMOL/L (ref 0–11.9)
ANION GAP SERPL CALC-SCNC: 8 MMOL/L (ref 0–11.9)
AST SERPL-CCNC: 51 U/L (ref 12–45)
BASOPHILS # BLD AUTO: 0.4 % (ref 0–1.8)
BASOPHILS # BLD: 0.04 K/UL (ref 0–0.12)
BILIRUB SERPL-MCNC: 0.7 MG/DL (ref 0.1–1.5)
BNP SERPL-MCNC: 8 PG/ML (ref 0–100)
BUN SERPL-MCNC: 17 MG/DL (ref 8–22)
BUN SERPL-MCNC: 27 MG/DL (ref 8–22)
CALCIUM SERPL-MCNC: 8.4 MG/DL (ref 8.5–10.5)
CALCIUM SERPL-MCNC: 8.5 MG/DL (ref 8.5–10.5)
CHLORIDE SERPL-SCNC: 105 MMOL/L (ref 96–112)
CHLORIDE SERPL-SCNC: 107 MMOL/L (ref 96–112)
CO2 SERPL-SCNC: 20 MMOL/L (ref 20–33)
CO2 SERPL-SCNC: 24 MMOL/L (ref 20–33)
CREAT SERPL-MCNC: 0.7 MG/DL (ref 0.5–1.4)
CREAT SERPL-MCNC: 0.93 MG/DL (ref 0.5–1.4)
CRP SERPL HS-MCNC: 0.3 MG/DL (ref 0–0.75)
EKG IMPRESSION: NORMAL
EOSINOPHIL # BLD AUTO: 0.19 K/UL (ref 0–0.51)
EOSINOPHIL NFR BLD: 1.8 % (ref 0–6.9)
ERYTHROCYTE [DISTWIDTH] IN BLOOD BY AUTOMATED COUNT: 45 FL (ref 35.9–50)
GLOBULIN SER CALC-MCNC: 3.1 G/DL (ref 1.9–3.5)
GLUCOSE SERPL-MCNC: 139 MG/DL (ref 65–99)
GLUCOSE SERPL-MCNC: 195 MG/DL (ref 65–99)
HCT VFR BLD AUTO: 40 % (ref 42–52)
HGB BLD-MCNC: 13.9 G/DL (ref 14–18)
IMM GRANULOCYTES # BLD AUTO: 0.05 K/UL (ref 0–0.11)
IMM GRANULOCYTES NFR BLD AUTO: 0.5 % (ref 0–0.9)
LIPASE SERPL-CCNC: 11 U/L (ref 11–82)
LYMPHOCYTES # BLD AUTO: 1.29 K/UL (ref 1–4.8)
LYMPHOCYTES NFR BLD: 12.4 % (ref 22–41)
MAGNESIUM SERPL-MCNC: 2.2 MG/DL (ref 1.5–2.5)
MCH RBC QN AUTO: 34.7 PG (ref 27–33)
MCHC RBC AUTO-ENTMCNC: 34.8 G/DL (ref 33.7–35.3)
MCV RBC AUTO: 99.8 FL (ref 81.4–97.8)
MONOCYTES # BLD AUTO: 0.76 K/UL (ref 0–0.85)
MONOCYTES NFR BLD AUTO: 7.3 % (ref 0–13.4)
NEUTROPHILS # BLD AUTO: 8.09 K/UL (ref 1.82–7.42)
NEUTROPHILS NFR BLD: 77.6 % (ref 44–72)
NRBC # BLD AUTO: 0 K/UL
NRBC BLD-RTO: 0 /100 WBC
PLATELET # BLD AUTO: 186 K/UL (ref 164–446)
PMV BLD AUTO: 11.6 FL (ref 9–12.9)
POTASSIUM SERPL-SCNC: 4.2 MMOL/L (ref 3.6–5.5)
POTASSIUM SERPL-SCNC: 7.7 MMOL/L (ref 3.6–5.5)
PREALB SERPL-MCNC: 31 MG/DL (ref 18–38)
PROT SERPL-MCNC: 7.7 G/DL (ref 6–8.2)
RBC # BLD AUTO: 4.01 M/UL (ref 4.7–6.1)
SODIUM SERPL-SCNC: 133 MMOL/L (ref 135–145)
SODIUM SERPL-SCNC: 141 MMOL/L (ref 135–145)
TROPONIN I SERPL-MCNC: <0.01 NG/ML (ref 0–0.04)
TROPONIN I SERPL-MCNC: <0.01 NG/ML (ref 0–0.04)
WBC # BLD AUTO: 10.4 K/UL (ref 4.8–10.8)

## 2019-03-18 PROCEDURE — 99214 OFFICE O/P EST MOD 30 MIN: CPT | Performed by: INTERNAL MEDICINE

## 2019-03-18 PROCEDURE — G0378 HOSPITAL OBSERVATION PER HR: HCPCS

## 2019-03-18 PROCEDURE — 86140 C-REACTIVE PROTEIN: CPT

## 2019-03-18 PROCEDURE — 700102 HCHG RX REV CODE 250 W/ 637 OVERRIDE(OP): Performed by: EMERGENCY MEDICINE

## 2019-03-18 PROCEDURE — 83735 ASSAY OF MAGNESIUM: CPT

## 2019-03-18 PROCEDURE — 84134 ASSAY OF PREALBUMIN: CPT

## 2019-03-18 PROCEDURE — 80048 BASIC METABOLIC PNL TOTAL CA: CPT

## 2019-03-18 PROCEDURE — 99214 OFFICE O/P EST MOD 30 MIN: CPT | Performed by: HOSPITALIST

## 2019-03-18 PROCEDURE — 83690 ASSAY OF LIPASE: CPT

## 2019-03-18 PROCEDURE — 306397 CUSHION, WAFFLE ADULT 19X19

## 2019-03-18 PROCEDURE — 93005 ELECTROCARDIOGRAM TRACING: CPT | Mod: 76 | Performed by: EMERGENCY MEDICINE

## 2019-03-18 PROCEDURE — 71045 X-RAY EXAM CHEST 1 VIEW: CPT

## 2019-03-18 PROCEDURE — 80053 COMPREHEN METABOLIC PANEL: CPT

## 2019-03-18 PROCEDURE — A9270 NON-COVERED ITEM OR SERVICE: HCPCS | Performed by: EMERGENCY MEDICINE

## 2019-03-18 PROCEDURE — 99285 EMERGENCY DEPT VISIT HI MDM: CPT

## 2019-03-18 PROCEDURE — 36415 COLL VENOUS BLD VENIPUNCTURE: CPT

## 2019-03-18 PROCEDURE — 84484 ASSAY OF TROPONIN QUANT: CPT

## 2019-03-18 PROCEDURE — A9270 NON-COVERED ITEM OR SERVICE: HCPCS | Performed by: HOSPITALIST

## 2019-03-18 PROCEDURE — 700102 HCHG RX REV CODE 250 W/ 637 OVERRIDE(OP): Performed by: HOSPITALIST

## 2019-03-18 PROCEDURE — 93005 ELECTROCARDIOGRAM TRACING: CPT

## 2019-03-18 PROCEDURE — 71045 X-RAY EXAM CHEST 1 VIEW: CPT | Performed by: RADIOLOGY

## 2019-03-18 PROCEDURE — 85025 COMPLETE CBC W/AUTO DIFF WBC: CPT

## 2019-03-18 PROCEDURE — 83880 ASSAY OF NATRIURETIC PEPTIDE: CPT

## 2019-03-18 PROCEDURE — 93005 ELECTROCARDIOGRAM TRACING: CPT | Performed by: HOSPITALIST

## 2019-03-18 RX ORDER — POLYETHYLENE GLYCOL 3350 17 G/17G
1 POWDER, FOR SOLUTION ORAL
Status: DISCONTINUED | OUTPATIENT
Start: 2019-03-18 | End: 2019-03-18 | Stop reason: HOSPADM

## 2019-03-18 RX ORDER — NITROGLYCERIN 0.4 MG/1
0.4 TABLET SUBLINGUAL PRN
Qty: 25 TAB | Refills: 0 | Status: SHIPPED | OUTPATIENT
Start: 2019-03-18

## 2019-03-18 RX ORDER — INSULIN GLARGINE 100 [IU]/ML
21 INJECTION, SOLUTION SUBCUTANEOUS EVERY EVENING
Status: DISCONTINUED | OUTPATIENT
Start: 2019-03-18 | End: 2019-03-18 | Stop reason: HOSPADM

## 2019-03-18 RX ORDER — NITROGLYCERIN 0.4 MG/1
0.4 TABLET SUBLINGUAL ONCE
Status: COMPLETED | OUTPATIENT
Start: 2019-03-18 | End: 2019-03-18

## 2019-03-18 RX ORDER — ISOSORBIDE MONONITRATE 30 MG/1
30 TABLET, EXTENDED RELEASE ORAL
Status: DISCONTINUED | OUTPATIENT
Start: 2019-03-18 | End: 2019-03-18 | Stop reason: HOSPADM

## 2019-03-18 RX ORDER — AMOXICILLIN 250 MG
2 CAPSULE ORAL 2 TIMES DAILY
Status: DISCONTINUED | OUTPATIENT
Start: 2019-03-18 | End: 2019-03-18 | Stop reason: HOSPADM

## 2019-03-18 RX ORDER — BISACODYL 10 MG
10 SUPPOSITORY, RECTAL RECTAL
Status: DISCONTINUED | OUTPATIENT
Start: 2019-03-18 | End: 2019-03-18 | Stop reason: HOSPADM

## 2019-03-18 RX ORDER — ASPIRIN 81 MG/1
162 TABLET, CHEWABLE ORAL ONCE
Status: COMPLETED | OUTPATIENT
Start: 2019-03-18 | End: 2019-03-18

## 2019-03-18 RX ORDER — ISOSORBIDE MONONITRATE 30 MG/1
30 TABLET, EXTENDED RELEASE ORAL EVERY MORNING
Qty: 90 TAB | Refills: 3 | Status: SHIPPED | OUTPATIENT
Start: 2019-03-18 | End: 2019-03-21

## 2019-03-18 RX ADMIN — NITROGLYCERIN 0.4 MG: 0.4 TABLET SUBLINGUAL at 04:40

## 2019-03-18 RX ADMIN — NITROGLYCERIN 0.4 MG: 0.4 TABLET SUBLINGUAL at 02:52

## 2019-03-18 RX ADMIN — ASPIRIN 81 MG 162 MG: 81 TABLET ORAL at 02:52

## 2019-03-18 ASSESSMENT — ENCOUNTER SYMPTOMS
NERVOUS/ANXIOUS: 0
NAUSEA: 0
TREMORS: 0
SHORTNESS OF BREATH: 0
FACIAL ASYMMETRY: 0
HEADACHES: 0
DIZZINESS: 0
VOMITING: 0
DEPRESSION: 0
PHOTOPHOBIA: 0
COUGH: 0
ABDOMINAL PAIN: 0
MYALGIAS: 0
WHEEZING: 0
CHILLS: 0
SORE THROAT: 0
PALPITATIONS: 0
TINGLING: 0
FOCAL WEAKNESS: 0
EYE PAIN: 0
EYE DISCHARGE: 0
DIARRHEA: 0
BRUISES/BLEEDS EASILY: 0
FEVER: 0

## 2019-03-18 NOTE — DISCHARGE INSTRUCTIONS
You were seen and evaluated in the Emergency Department at [facility] for:     Chest pain    You had the following tests and studies:    Thankfully your EKG and blood tests and chest x-ray are all stable    You received the following medications:    Nitroglycerin, aspirin    You received the following prescriptions:    Nitroglycerin, place 1 tablet under tongue as needed for chest pain  ----------------------------    Please make sure to follow up with:    Your cardiologist and primary care provider for routine health care, but if you have any new or worsening symptoms please return to the ER immediately.    Good luck, we hope you get better soon  ----------------------------    We always encourage patients to return IMMEDIATELY if they have:  Increased or changing pain, passing out, fevers over 100.4 (taken in your mouth or rectally) for more than 2 days, redness or swelling of skin or tissues, feeling like your heart is beating fast, chest pain that is new or worsening, trouble breathing, feeling like your throat is closing up and can not breath, inability to walk, weakness of any part of your body, new dizziness, severe bleeding that won't stop from any part of your body, if you can't eat or drink, or if you have any other concerns.   If you feel worse, please know that you can always return with any questions, concerns, worse symptoms, or you are feeling unsafe. We certainly cannot say for sure that we have ruled out every illness or dangerous disease, but we feel that at this specific time, your exam, tests, and vital signs like heart rate and blood pressure are safe for discharge.

## 2019-03-18 NOTE — ED PROVIDER NOTES
"ED Provider Note    CHIEF COMPLAINT  Chief Complaint   Patient presents with   • Palpitations      Pt states he felt his BP rise this am 0100 152/81, took his 25mg metropolol and 81mg ASA, denies N/V/ diaphoresis. 50mg of Metropolol total with in 24 hours. Pt denies any symptoms at this time.       [facility]    Primary care provider: Sheridan Jackman M.D.  Means of arrival: POV  History obtained from: patient and family  History limited by: nothing    Brijesh Brown is a 73 y.o. male who presents with retrosternal chest pressure radiating to his neck, onset at approximately 1 AM.  Throughout the day he is feeling \"my heart beating harder than usual\".  Denies any syncope or diaphoresis or vomiting.  Symptoms worsened at 1 AM, he checked his blood pressure and his systolic was elevated to 180, so he took an extra dose of his baby aspirin and his metoprolol which gave moderate relief.  At worst his symptoms were moderate in severity at present they are minimal.  No recent illness or fevers or cough.  No aggravating factors noted.  Many prior episodes.  Last month admitted with an abnormal cardiac cath and he had significant disease, also not very amenable to CABG.     REVIEW OF SYSTEMS  Constitutional: Negative for fever or chills.   HENT: Negative for rhinorrhea or sore throat.    Eyes: Negative for vision changes or discharge.   Respiratory: Negative for cough, positive for occasional shortness of breath.    Cardiovascular: Positive for chest pain.  Gastrointestinal: Negative for nausea, vomiting, abdominal pain.   Genitourinary: Negative for dysuria or flank pain.   Musculoskeletal: Negative for back pain or joint pain.   Skin: Negative for itching or rash.   Neurological: Negative for sensory or motor changes.   Psychiatric/Behavioral: Negative for depression or suicidal ideas, positive for anxiousness.  See HPI for further details. All other systems are negative.     PAST MEDICAL HISTORY   has a past medical " "history of Benign essential HTN (4/23/2012); CAD (coronary artery disease); CVA, old, disturbances of vision; Depression (4/23/2012); Diabetes ([facility]); Myalgia; Other and unspecified hyperlipidemia (12/20/2013); Overweight(278.02) (4/23/2012); and PVD (peripheral vascular disease) ([facility]) (4/23/2012).    PAST FAMILY HISTORY  Family History   Problem Relation Age of Onset   • Lung Disease Mother    • Heart Disease Father         CABG x3, then re-do CABG x 4   • Diabetes Sister    • Heart Disease Sister        SOCIAL HISTORY  Social History     Social History Main Topics   • Smoking status: Former Smoker     Packs/day: 1.00     Years: 20.00     Types: Cigarettes     Quit date: 1/1/1984   • Smokeless tobacco: Never Used      Comment: quit '84, 20 py history   • Alcohol use No   • Drug use: Unknown   • Sexual activity: Not on file      Comment:  48 years       SURGICAL HISTORY   has a past surgical history that includes zzz cardiac cath and incision hernia repair.    CURRENT MEDICATIONS  Home Medications     Reviewed by Shari Magaña R.N. (Registered Nurse) on 03/18/19 at 0227  Med List Status: Complete   Medication Last Dose Status   aspirin EC (ECOTRIN) 81 MG Tablet Delayed Response 3/18/2019 Active   Evolocumab (REPATHA) 140 MG/ML Solution Prefilled Syringe 3/18/2019 Active   Insulin Degludec (TRESIBA FLEXTOUCH) 200 UNIT/ML Solution Pen-injector 3/18/2019 Active   metFORMIN (GLUCOPHAGE) 500 MG Tab 3/18/2019 Active   metoprolol (LOPRESSOR) 25 MG Tab 3/18/2019 Active   zolpidem (AMBIEN) 5 MG Tab  Active              ALLERGIES  Allergies   Allergen Reactions   • Statins [Hmg-Coa-R Inhibitors]      Caused muscle pain     PHYSICAL EXAM  VITAL SIGNS: /70   Pulse 78   Temp 36.8 °C (98.2 °F) (Temporal)   Resp 15   Ht 1.727 m (5' 8\")   Wt 103 kg (227 lb 1.2 oz)   SpO2 93%   BMI 34.53 kg/m²    Pulse ox interpretation: On room air, I interpret this pulse ox as normal.  Constitutional: Sitting up, mild " distress.  HEENT: Normocephalic, atraumatic. Posterior pharynx clear, mucous membranes moist.  Eyes:  EOMI. Normal sclerae.  Slightly pale conjunctivae.  Neck: Supple, nontender.  Chest/Pulmonary: Clear to ausculation bilaterally, no wheezes or rhonchi.  Cardiovascular: Regular rate and rhythm, possible subtle systolic murmur.  Symmetric radial pulses.  Abdomen: Soft, nontender; no rebound, guarding, or masses.  Back: No CVA or midline tenderness.   Musculoskeletal: No deformity, trace bilateral symmetric lower extremity edema.  Neuro: Clear speech, normal coordination, cranial nerves II-XII grossly intact, no focal asymmetry or sensory deficits.   Psych: Anxious but appropriate and cooperative.  Skin: No rashes, warm and dry.    DIAGNOSTIC STUDIES / PROCEDURES    LABS & EKG  Results for orders placed or performed during the hospital encounter of 03/18/19   CBC w/ Differential   Result Value Ref Range    WBC 10.4 4.8 - 10.8 K/uL    RBC 4.01 (L) 4.70 - 6.10 M/uL    Hemoglobin 13.9 (L) 14.0 - 18.0 g/dL    Hematocrit 40.0 (L) 42.0 - 52.0 %    MCV 99.8 (H) 81.4 - 97.8 fL    MCH 34.7 (H) 27.0 - 33.0 pg    MCHC 34.8 33.7 - 35.3 g/dL    RDW 45.0 35.9 - 50.0 fL    Platelet Count 186 164 - 446 K/uL    MPV 11.6 9.0 - 12.9 fL    Neutrophils-Polys 77.60 (H) 44.00 - 72.00 %    Lymphocytes 12.40 (L) 22.00 - 41.00 %    Monocytes 7.30 0.00 - 13.40 %    Eosinophils 1.80 0.00 - 6.90 %    Basophils 0.40 0.00 - 1.80 %    Immature Granulocytes 0.50 0.00 - 0.90 %    Nucleated RBC 0.00 /100 WBC    Neutrophils (Absolute) 8.09 (H) 1.82 - 7.42 K/uL    Lymphs (Absolute) 1.29 1.00 - 4.80 K/uL    Monos (Absolute) 0.76 0.00 - 0.85 K/uL    Eos (Absolute) 0.19 0.00 - 0.51 K/uL    Baso (Absolute) 0.04 0.00 - 0.12 K/uL    Immature Granulocytes (abs) 0.05 0.00 - 0.11 K/uL    NRBC (Absolute) 0.00 K/uL   Btype Natriuretic Peptide   Result Value Ref Range    B Natriuretic Peptide 8 0 - 100 pg/mL   Troponin STAT   Result Value Ref Range    Troponin I  <0.01 0.00 - 0.04 ng/mL   Troponin in two (2) hours   Result Value Ref Range    Troponin I <0.01 0.00 - 0.04 ng/mL   Comp Metabolic Panel   Result Value Ref Range    Sodium 133 (L) 135 - 145 mmol/L    Potassium 7.7 (HH) 3.6 - 5.5 mmol/L    Chloride 105 96 - 112 mmol/L    Co2 20 20 - 33 mmol/L    Anion Gap 8.0 0.0 - 11.9    Glucose 195 (H) 65 - 99 mg/dL    Bun 17 8 - 22 mg/dL    Creatinine 0.93 0.50 - 1.40 mg/dL    Calcium 8.5 8.5 - 10.5 mg/dL    AST(SGOT) 51 (H) 12 - 45 U/L    ALT(SGPT) 24 2 - 50 U/L    Alkaline Phosphatase 59 30 - 99 U/L    Total Bilirubin 0.7 0.1 - 1.5 mg/dL    Albumin 4.6 3.2 - 4.9 g/dL    Total Protein 7.7 6.0 - 8.2 g/dL    Globulin 3.1 1.9 - 3.5 g/dL    A-G Ratio 1.5 g/dL   LIPASE   Result Value Ref Range    Lipase 11 11 - 82 U/L   MAGNESIUM   Result Value Ref Range    Magnesium 2.2 1.5 - 2.5 mg/dL   ESTIMATED GFR   Result Value Ref Range    GFR If African American >60 >60 mL/min/1.73 m 2    GFR If Non African American >60 >60 mL/min/1.73 m 2   BMP   Result Value Ref Range    Sodium 141 135 - 145 mmol/L    Potassium 4.2 3.6 - 5.5 mmol/L    Chloride 107 96 - 112 mmol/L    Co2 24 20 - 33 mmol/L    Glucose 139 (H) 65 - 99 mg/dL    Bun 27 (H) 8 - 22 mg/dL    Creatinine 0.70 0.50 - 1.40 mg/dL    Calcium 8.4 (L) 8.5 - 10.5 mg/dL    Anion Gap 10.0 0.0 - 11.9   CRP QUANTITIVE (NON-CARDIAC)   Result Value Ref Range    Stat C-Reactive Protein 0.30 0.00 - 0.75 mg/dL   PREALBUMIN   Result Value Ref Range    Pre-Albumin 31.0 18.0 - 38.0 mg/dL   ESTIMATED GFR   Result Value Ref Range    GFR If African American >60 >60 mL/min/1.73 m 2    GFR If Non African American >60 >60 mL/min/1.73 m 2   EKG   Result Value Ref Range    Report       [facility] Emergency Dept.    Test Date:  2019  Pt Name:    [facility]                  Department: ER  MRN:        [id number]                      Room:  Gender:     Male                         Technician: [id number]  :        1945                    Requested By:ER TRIAGE PROTOCOL  Order #:    [id number]                    Reading MD: Manny Villavicencio MD    Measurements  Intervals                                Axis  Rate:       71                           P:          10  OH:         168                          QRS:        -12  QRSD:       98                           T:          9  QT:         388  QTc:        422    Interpretive Statements  SINUS RHYTHM  Compared to ECG 2019 09:37:46  Stable EKG no obvious STEMI or strain or dysrhythmia  Electronically Signed On 3- 18:11:24 PDT by Manny Villavicencio MD     EKG in two (2) hours   Result Value Ref Range    Report       [facility] Emergency Dept.    Test Date:  2019  Pt Name:    [facility]                  Department: ER  MRN:        [id number]                      Room:        05  Gender:     Male                         Technician: [id number]  :        1945                   Requested By:MANNY VILLAVICENCIO  Order #:    [id number]                    Reading MD: Manny Villavicencio MD    Measurements  Intervals                                Axis  Rate:       76                           P:          5  OH:         176                          QRS:        -20  QRSD:       98                           T:          0  QT:         404  QTc:        455    Interpretive Statements  SINUS RHYTHM  BORDERLINE LEFT AXIS DEVIATION  EARLY PRECORDIAL R/S TRANSITION  Compared to ECG 2019 02:13:32  No significant change  Electronically Signed On 3- 18:11:09 PDT by Manny Villavicencio MD     EKG in four (4) hours   Result Value Ref Range    Report       [facility] Emergency Dept.    Test Date:  2019  Pt Name:    [facility]                  Department: ER  MRN:        [id number]                      Room:       RD 05  Gender:     Male                         Technician: [id number]  :        1945                   Requested By:BERTA PRASAD  Order #:     [id number]                    Reading MD: Manny Walls MD    Measurements  Intervals                                Axis  Rate:       77                           P:          0  OK:                                      QRS:        -41  QRSD:       112                          T:          -15  QT:         408  QTc:        462    Interpretive Statements  Limited EKG due to motion artifact  Electronically Signed On 3- 18:11:57 PDT by Manny Walls MD       RADIOLOGY  DX-CHEST-PORTABLE (1 VIEW)   Final Result      Stable chest without acute/new abnormality.          COURSE & MEDICAL DECISION MAKING    This is a 73 y.o. male who presents with chest pain radiating to his neck.  History of coronary artery disease and angina.    Differential Diagnosis includes but is not limited to:  ACS, PE, angina, pneumonia, anxiety, dissection    ED Course:  Plan EKG, chest x-ray, labs, I will give a full dose of aspirin and treat his pain with nitro.  EKG reassuring no obvious STEMI or strain or dysrhythmia.  Stable with prior.  Chest x-ray with no acute findings.  CBC reassuring and stable no fevers and white blood cell count normal doubt infection.  Symmetric pulses normal mediastinum on x-ray highly doubt dissection.  No hypoxia or tachycardia or leg swelling, doubt PE.  Significant delay in chemistry, but found to have an elevated potassium, he has no history of kidney disease present hemolysis will repeat labs before emergent intervention.  He has no EKG changes to suggest acute hyperkalemia.  I consulted with the patient's cardiologist who happens to be on call for us, Dr. Waterman, she recommends admission and will evaluate the patient for serial troponins studies and possible further workup.  On recheck after nitro the patient is resting comfortably, blood pressure improved.  Initially hesitant to stay for admission, but after long discussion agrees with plan for further cardiac risk stratification and workup.  I  consulted with our hospitalist Dr. Call, and she will kindly admit the patient for further workup and treatment.  He is hemodynamically stable for admission to the telemetry floor in guarded condition.  Troponin #2 is negative.  Repeat EKG is stable.  Repeat BMP shows normal potassium.  Dr. Waterman, cardiology, evaluated the patient at the bedside, and after she reviewed the patient's workup and personally examined him, she feels he is safe for outpatient follow-up.  The patient wishes to go home.  I feel this is reasonable with urgent outpatient cardiology follow-up as the patient has stable vital signs and is now pain-free.  He will be discharged with nitroglycerin, and he will return immediately for any new or worsening pain or dyspnea or fevers or near syncope or any other new or worsening symptoms.    Medications   aspirin (ASA) chewable tab 162 mg (162 mg Oral Given 3/18/19 0252)   nitroglycerin (NITROSTAT) tablet 0.4 mg (0.4 mg Sublingual Given 3/18/19 0252)   nitroglycerin (NITROSTAT) tablet 0.4 mg (0.4 mg Sublingual Given 3/18/19 0440)     FINAL IMPRESSION  1. Acute chest pain    2. Angina at rest (HCC)    3. Peripheral arterial disease (HCC)    4. Obstructive sleep apnea    5. Benign essential HTN    6. Type 2 diabetes mellitus with complication, with long-term current use of insulin (HCC)    7. Coronary artery disease involving native coronary artery of native heart with unstable angina pectoris (HCC)        PRESCRIPTIONS  Discharge Medication List as of 3/18/2019  7:14 AM      START taking these medications    Details   nitroglycerin (NITROSTAT) 0.4 MG SL Tab Place 1 Tab under tongue as needed for Chest Pain., Disp-25 Tab, R-0, Normal           FOLLOW UP  Sheridan Jackman M.D.  [address]  [facility] [address]  [facility] [address]  [phone]    Schedule an appointment as soon as possible for a visit in 3 days  for routine health care    [facility] Emergency Dept  [address]  [place]  [place] [id number] [phone]  Today  If you have ANY new or worse symptoms!    Abida Waterman M.D.  [address]  [address]  [place] [id number] [phone]    Schedule an appointment as soon as possible for a visit in 2 days  for follow-up with your cardiologist    -DISCHARGE-     Results, exam findings, clinical impression, presumed diagnosis, treatment options, and strict return precautions were discussed with the patient and family, and they verbalized understanding, agreed with, and appreciated the plan of care.    Pertinent Labs & Imaging studies reviewed and verified by myself, as well as nursing notes and the patient's past medical, family, and social histories (See chart for details).    The patient is referred to a primary physician for blood pressure management, diabetic screening, and for all other preventative health concerns.     Portions of this record were made with voice recognition software.  Despite my review, spelling/grammar/context errors may still remain. Interpretation of this chart should be taken in this context.    Electronically signed by Manny Walls on 3/18/2019 at 6:18 PM.

## 2019-03-18 NOTE — ED NOTES
Pt rounded on, up to restroom with steady gait, respirations even and unlabored, repositioning self as needed, pt back to bed monitored, family aware of POC.

## 2019-03-18 NOTE — ED NOTES
Pt discharged to home. Pt was given follow up instructions and is aware that a prescription for nitro was sent to his pharmacy. Pt verbalized understanding of all instructions for discharge and is wheeled out of ED with family driving him home.

## 2019-03-18 NOTE — ED NOTES
Assumed care of patient. Pt assessed, AAO x 4 . Patient's concerns addressed.  Fall precautions in place.  Pt repositioned and comfortable.  Call light within reach. Will continue to monitor. PIV established. Labs sent. Medicated per MAR.

## 2019-03-18 NOTE — CONSULTS
Cardiology Initial Consultation    Date of Service  3/18/2019    Referring Physician  Manny Walls M.D.    Reason for Consultation  Chest pain    History of Presenting Illness  Brijesh Brown is a 73 y.o. male with a past medical history of obliterative coronary disease who presented 3/18/2019 with chest pain    In with his family  Got here in the middle the night, playing drums and exercising yesterday.  Had decreased his beta-blocker.  Felt his heart racing just a little bit for most of the day.  However later had some tightness.  This concerned him and his wife.  For this reason they came to the emergency room at 2 AM    Chest pain-free vital signs normal  Upon questioning upon why he did not call our office, he said he was looking for me in particular.  Wife explains she would rather see me and have my cell phone number to be on call for her 24 hours    No other complaints    Review of Systems  Review of Systems   Constitutional: Negative for chills and fever.   HENT: Negative for congestion.    Eyes: Negative for pain and discharge.   Respiratory: Negative for cough and wheezing.    Cardiovascular: Negative for chest pain and palpitations.   Gastrointestinal: Negative for abdominal pain, nausea and vomiting.   Genitourinary: Negative for difficulty urinating and dysuria.   Musculoskeletal: Negative for myalgias.   Skin: Negative for rash.   Allergic/Immunologic: Negative for environmental allergies and food allergies.   Neurological: Negative for dizziness, tremors, syncope and facial asymmetry.   Hematological: Does not bruise/bleed easily.   Psychiatric/Behavioral: The patient is not nervous/anxious.    All other systems reviewed and are negative.      Past Medical History   has a past medical history of Benign essential HTN (4/23/2012); CAD (coronary artery disease); CVA, old, disturbances of vision; Depression (4/23/2012); Diabetes (HCC); Myalgia; Other and unspecified hyperlipidemia (12/20/2013);  Overweight(278.02) (4/23/2012); and PVD (peripheral vascular disease) (HCC) (4/23/2012).    Surgical History   has a past surgical history that includes zzz cardiac cath and incision hernia repair.    Family History  family history includes Diabetes in his sister; Heart Disease in his father and sister; Lung Disease in his mother.    Social History   reports that he quit smoking about 35 years ago. His smoking use included Cigarettes. He has a 20.00 pack-year smoking history. He has never used smokeless tobacco. He reports that he does not drink alcohol.    Medications  Prior to Admission Medications   Prescriptions Last Dose Informant Patient Reported? Taking?   Evolocumab (REPATHA) 140 MG/ML Solution Prefilled Syringe 3/18/2019 at Unknown time  No No   Sig: Inject 140 mg as instructed every 14 days.   Insulin Degludec (TRESIBA FLEXTOUCH) 200 UNIT/ML Solution Pen-injector 3/18/2019 at Unknown time Patient Yes No   Sig: Inject 26 Units as instructed every evening.   aspirin EC (ECOTRIN) 81 MG Tablet Delayed Response 3/18/2019 at Unknown time Patient Yes No   Sig: Take 1 Tab by mouth every day.   metFORMIN (GLUCOPHAGE) 500 MG Tab 3/18/2019 at Unknown time Patient Yes No   Sig: Take 1,000 mg by mouth 2 times a day, with meals.   metoprolol (LOPRESSOR) 25 MG Tab 3/18/2019 at Unknown time  No No   Sig: Take 1 Tab by mouth 2 Times a Day.   Patient taking differently: Take 12.5 mg by mouth 2 times a day.   zolpidem (AMBIEN) 5 MG Tab  Patient Yes No   Sig: Take 5 mg by mouth at bedtime as needed for Sleep.      Facility-Administered Medications: None       Allergies  Allergies   Allergen Reactions   • Statins [Hmg-Coa-R Inhibitors]      Caused muscle pain       Vital signs in last 24 hours  Temp:  [36.8 °C (98.2 °F)] 36.8 °C (98.2 °F)  Pulse:  [65-83] 78  Resp:  [15-21] 15  BP: (143-152)/(70-81) 143/70  SpO2:  [92 %-96 %] 93 %    Physical Exam  Physical Exam   Constitutional: He is oriented to person, place, and time. He  appears well-developed and well-nourished.   HENT:   Head: Normocephalic and atraumatic.   Eyes: Pupils are equal, round, and reactive to light. EOM are normal. No scleral icterus.   Neck: No JVD present. No thyromegaly present.   Cardiovascular: Normal rate, regular rhythm and intact distal pulses.    No murmur heard.  Pulmonary/Chest: Breath sounds normal. No respiratory distress. He exhibits no tenderness.   Abdominal: Soft. Bowel sounds are normal. He exhibits no distension.   Musculoskeletal: He exhibits no edema or tenderness.   Lymphadenopathy:     He has no cervical adenopathy.   Neurological: He is alert and oriented to person, place, and time.   Skin: Skin is warm and dry. No rash noted.   Psychiatric: He has a normal mood and affect. His behavior is normal.       Lab Review  Lab Results   Component Value Date/Time    WBC 10.4 03/18/2019 02:54 AM    RBC 4.01 (L) 03/18/2019 02:54 AM    HEMOGLOBIN 13.9 (L) 03/18/2019 02:54 AM    HEMATOCRIT 40.0 (L) 03/18/2019 02:54 AM    MCV 99.8 (H) 03/18/2019 02:54 AM    MCH 34.7 (H) 03/18/2019 02:54 AM    MCHC 34.8 03/18/2019 02:54 AM    MPV 11.6 03/18/2019 02:54 AM      Lab Results   Component Value Date/Time    SODIUM 141 03/18/2019 05:53 AM    POTASSIUM 4.2 03/18/2019 05:53 AM    CHLORIDE 107 03/18/2019 05:53 AM    CO2 24 03/18/2019 05:53 AM    GLUCOSE 139 (H) 03/18/2019 05:53 AM    BUN 27 (H) 03/18/2019 05:53 AM    CREATININE 0.70 03/18/2019 05:53 AM    CREATININE 0.98 10/01/2013    BUNCREATRAT 18 06/26/2015 08:54 AM      Lab Results   Component Value Date/Time    ASTSGOT 51 (H) 03/18/2019 03:57 AM    ALTSGPT 24 03/18/2019 03:57 AM     Lab Results   Component Value Date/Time    CHOLSTRLTOT 245 (H) 02/08/2019 11:53 AM     (H) 02/08/2019 11:53 AM    HDL 42 02/08/2019 11:53 AM    TRIGLYCERIDE 121 02/08/2019 11:53 AM    TROPONINI <0.01 03/18/2019 04:42 AM       Recent Labs      03/18/19   0254   BNPBTYPENAT  8       Cardiac Imaging and Procedures Review  EKG:  My  personal interpretation of the EKG dated sinus rhythm no ST changes    Echocardiogram: Ejection fraction 55% February    Cardiac Catheterization: Angiogram February significant disease not amenable to open heart surgery or PCI    Imaging  Chest X-Ray: No infiltrate    Stress Test: PET scan in February showed moderate inferior ischemia normal ejection fraction 72% no infarct    Assessment/Plan    Chest pain coronary disease  He has ischemia proven by nuclear imaging  He is not on maximal or optimal medical therapy  I have spent over an hour at the last visit discussing this with him and his wife.  His son is quite observant and listening.  We talked about expectations in May being on call for them 24 hours a day is not reasonable.  We talked about the care based plan/team.  Trial of imdur 30 mg  Strongly recommend going back on low-dose beta-blocker and titrating    Uncontrolled diabetes  Glucose still elevated  Kidney function is normal but optimizing weight is paramount as well as maximizing medications    He is not having acute coronary syndrome today  He understands about his medications and the natural progression of this disease  My nurse in the office will call him today  He has a standing appointment me within the month and we will follow-up by telephone and by my chart to titrate medications.  We talked about the use of our excellent nurse practitioners in the meantime    They voiced understanding I also discussed this with ER doctor.  He will go home  Thank you for allowing me to participate in the care of this patient.  Please contact me with any questions.    Abida Waterman M.D.   Cardiologist, [facility]  [phone]

## 2019-03-18 NOTE — ED NOTES
Pt rounded on, resting in bed, respirations even and unlabored, repositioning self as needed, reports discomfort, additional pillows provided, Dr. Waterman to bedside.  Pt's wife reassured, and questions answered.

## 2019-03-18 NOTE — TELEPHONE ENCOUNTER
Abida Waterman M.D.  Traci Clements R.N.             Good morning!   Can you call the patient this morning.  Was in the ER a few minutes ago with chest pain.  Decreased beta-blocker which might of had a part of it.  Would recommend increasing metoprolol back to 25.  Also he has nitroglycerin as needed, would be reasonable to call in isosorbide 30 daily as well.     Should have a follow-up with me in the next month or 2   Thank you so much I appreciate!!        Called pt, s/w wife (Patti), discussed going back to Metoprolol 25 BID and adding long acting nitrate (Isosorbide 30mg), all questions answered regarding new meds, education provided. Wife would like to know if there is any other alternative for Metoprolol as it made him really lethargic that is why his PCP decreased dose. Informed wife will discussed concerns w/ Dr Waterman. Wife appreciative of call and verbalizes understanding    New Rx for Metoprolol 25 BID and Isosorbide 30mg daily sent to [facility] per wife's request.     To Dr Waterman

## 2019-03-18 NOTE — H&P
Hospital Medicine CONSULT    Date of Service  3/18/2019    Primary Care Physician  Sheridan Jackman M.D.    Consultants  Dr. Julienne Waterman, cardiology    Code Status  Full    Chief Complaint  Chief Complaint   Patient presents with   • Palpitations      Pt states he felt his BP rise this am 0100 152/81, took his 25mg metropolol and 81mg ASA, denies N/V/ diaphoresis. 50mg of Metropolol total with in 24 hours. Pt denies any symptoms at this time.       History of Presenting Illness  73 y.o. male who presented on 3/18/2019 with chest pressure.  This patient is known to the hospital and was just admitted to our facility by [facility] internal medicine resident team last month for stable angina.  He carries a known history of coronary artery disease.  He had undergone cardiac catheterization with Dr. Kraft which showed multi vessel disease with a patent stent in the LAD.  Cardiothoracic surgery has been consulted and he was seen by Dr. Cordova who noted that the patient is a poor candidate for coronary artery bypass graft from a anatomic standpoint.  The patient had been discharged home on medical management.  He states that he had been feeling well for approximately the past month.  This evening however, while he was at rest, he began to experience a centralized chest pressure which is nonradiating.  It was associated with diaphoresis, and lightheadedness.  There are no alleviating or aggravating factors reported.  Earlier in the day, he had may have potentially over exerting himself and had been having episodes of palpitations throughout the day.  When he began to have his chest pressure, he checked his blood pressure and noted that it was in the 170s to the 180s which is very unusual for therefore he came to the hospital for further evaluation.    UPDATE: I was asked to consult on this patient by Dr. Manny Walls, he has subsequently been seen by Dr. Waterman of cardiology in the interim and she has cleared him for  discharge home on as needed sublingual nitroglycerin.  I agree with this plan.    Review of Systems  Review of Systems   Constitutional: Negative for chills and fever.   HENT: Negative for congestion and sore throat.    Eyes: Negative for photophobia.   Respiratory: Negative for cough, shortness of breath and wheezing.    Cardiovascular: Positive for chest pain. Negative for palpitations.   Gastrointestinal: Negative for abdominal pain, diarrhea, nausea and vomiting.   Genitourinary: Negative for dysuria.   Musculoskeletal: Negative for myalgias.   Skin: Negative.    Neurological: Negative for dizziness, tingling, focal weakness and headaches.   Psychiatric/Behavioral: Negative for depression and suicidal ideas.       Past Medical History  Past Medical History:   Diagnosis Date   • Other and unspecified hyperlipidemia 12/20/2013   • Depression 4/23/2012   • Benign essential HTN 4/23/2012   • Overweight(278.02) 4/23/2012   • PVD (peripheral vascular disease) ([facility]) 4/23/2012   • CAD (coronary artery disease)     S/P NIKKI X 3 in '04   • CVA, old, disturbances of vision     right eye   • Diabetes ([facility])    • Myalgia        Surgical History  Past Surgical History:   Procedure Laterality Date   • INCISION HERNIA REPAIR     • ZZZ CARDIAC CATH         Family History  Family History   Problem Relation Age of Onset   • Lung Disease Mother    • Heart Disease Father         CABG x3, then re-do CABG x 4   • Diabetes Sister    • Heart Disease Sister        Social History  Social History   Substance Use Topics   • Smoking status: Former Smoker     Packs/day: 1.00     Years: 20.00     Types: Cigarettes     Quit date: 1/1/1984   • Smokeless tobacco: Never Used      Comment: quit '84, 20 py history   • Alcohol use No       Allergies  Allergies   Allergen Reactions   • Statins [Hmg-Coa-R Inhibitors]      Caused muscle pain       Medications  No current facility-administered medications on file prior to encounter.      Current Outpatient  Prescriptions on File Prior to Encounter   Medication Sig Dispense Refill   • Evolocumab (REPATHA) 140 MG/ML Solution Prefilled Syringe Inject 140 mg as instructed every 14 days. 2 Syringe 0   • metoprolol (LOPRESSOR) 25 MG Tab Take 1 Tab by mouth 2 Times a Day. (Patient taking differently: Take 12.5 mg by mouth 2 times a day.) 60 Tab 0   • metFORMIN (GLUCOPHAGE) 500 MG Tab Take 1,000 mg by mouth 2 times a day, with meals.     • zolpidem (AMBIEN) 5 MG Tab Take 5 mg by mouth at bedtime as needed for Sleep.     • aspirin EC (ECOTRIN) 81 MG Tablet Delayed Response Take 1 Tab by mouth every day. 30 Tab 11   • Insulin Degludec (TRESIBA FLEXTOUCH) 200 UNIT/ML Solution Pen-injector Inject 26 Units as instructed every evening.         Physical Exam  Hemodynamics  Temp (24hrs), Av.8 °C (98.2 °F), Min:36.8 °C (98.2 °F), Max:36.8 °C (98.2 °F)   Temperature: 36.8 °C (98.2 °F)  Pulse  Av.5  Min: 65  Max: 75 Heart Rate (Monitored): 72  Blood Pressure : 143/70, NIBP: 143/70      Respiratory      Respiration: 19, Pulse Oximetry: 95 %             Physical Exam   Constitutional: He is oriented to person, place, and time. No distress.   Elderly, appears older than stated age, chronically ill-appearing   HENT:   Head: Normocephalic and atraumatic.   Right Ear: External ear normal.   Left Ear: External ear normal.   Eyes: EOM are normal. Right eye exhibits no discharge. Left eye exhibits no discharge.   Neck: Neck supple. No JVD present.   Cardiovascular: Normal rate, regular rhythm and normal heart sounds.    Pulmonary/Chest: Effort normal and breath sounds normal. No respiratory distress. He exhibits no tenderness.   Abdominal: Soft. Bowel sounds are normal. He exhibits no distension. There is no tenderness.   Musculoskeletal: He exhibits no edema.   Neurological: He is alert and oriented to person, place, and time. No cranial nerve deficit.   Skin: Skin is dry. He is not diaphoretic. No erythema. There is pallor.    Psychiatric: He has a normal mood and affect. His behavior is normal.   Nursing note and vitals reviewed.    Capillary refill less than 3 seconds, distal pulses intact    Laboratory:  Recent Labs      03/18/19   0254   WBC  10.4   RBC  4.01*   HEMOGLOBIN  13.9*   HEMATOCRIT  40.0*   MCV  99.8*   MCH  34.7*   MCHC  34.8   RDW  45.0   PLATELETCT  186   MPV  11.6         No results for input(s): ALTSGPT, ASTSGOT, ALKPHOSPHAT, TBILIRUBIN, DBILIRUBIN, GAMMAGT, AMYLASE, LIPASE, ALB, PREALBUMIN, GLUCOSE in the last 72 hours.      Recent Labs      03/18/19   0254   BNPBTYPENAT  8         Lab Results   Component Value Date    TROPONINI <0.01 03/18/2019       Imaging  Dx-chest-portable (1 View)    Result Date: 3/18/2019  3/18/2019 2:49 AM HISTORY/REASON FOR EXAM:  Chest Pain. Palpitations TECHNIQUE/EXAM DESCRIPTION AND NUMBER OF VIEWS: Single portable view of the chest. COMPARISON: 2/8/2019 FINDINGS: Cardiomediastinal silhouette is normal. No focal consolidation, pleural effusion, pulmonary edema or pneumothorax. Stable bibasilar linear opacities most likely scarring or discoid atelectasis. No acute osseous abnormality.     Stable chest without acute/new abnormality.        Assessment/Plan:  Anticipate that patient will need less than 2 midnights for management of the discussed medical issues.    * Angina pectoris (HCC)   Assessment & Plan    Patient is a known history of multivessel coronary artery disease status post stenting.  He just underwent cardiac angiogram last week and was assessed by cardiothoracic surgery and deemed not to be a candidate for bypass graft secondary to anatomic findings.  His symptoms have improved significantly with sublingual nitroglycerin.  He will be monitored overnight on the telemetry floor, I will continue to trend troponin levels, I have started him on Imdur for medical optimization.  If this alone is insufficient, we can also consider the addition of Ranexa.  Dr. Waterman of cardiology  was consulted by the emergency room physician and I look forward to her recommendations.  I defer to cardiology for initiation of statin therapy.     CAD (coronary artery disease)- (present on admission)   Assessment & Plan    Treatment as noted above     Benign essential HTN- (present on admission)   Assessment & Plan    Blood pressure is currently elevated, he does have room for the addition of Imdur, continue this along with his home metoprolol.     Type II diabetes mellitus (HCC)- (present on admission)   Assessment & Plan    Continue home metformin and Lantus, monitor with Accu-Cheks         Prophylaxis: Sequential compression devices for DVT prophylaxis, no PPI indicated, bowel protocol as needed

## 2019-03-18 NOTE — ASSESSMENT & PLAN NOTE
Patient is a known history of multivessel coronary artery disease status post stenting.  He just underwent cardiac angiogram last week and was assessed by cardiothoracic surgery and deemed not to be a candidate for bypass graft secondary to anatomic findings.  His symptoms have improved significantly with sublingual nitroglycerin.  He will be monitored overnight on the telemetry floor, I will continue to trend troponin levels, I have started him on Imdur for medical optimization.  If this alone is insufficient, we can also consider the addition of Ranexa.  Dr. Waterman of cardiology was consulted by the emergency room physician and I appreciate her recommendations.  I defer to cardiology for initiation of statin therapy.

## 2019-03-18 NOTE — ED NOTES
Lab called to report hemolyzed CMP, lab recollected, pt informed, RN apologized for delay. RN called lab back to ensure 2 hour trop is not run with labs just sent as it is not yet time for repeat trop.

## 2019-03-18 NOTE — ASSESSMENT & PLAN NOTE
Blood pressure is currently elevated, he does have room for the addition of Imdur, continue this along with his home metoprolol.

## 2019-03-18 NOTE — ED NOTES
Pt requesting to be discharged, all MD's aware. Pt to be DC'd after BMP results. Family requesting to speak to ERP again. Dr. Walls notified.

## 2019-03-18 NOTE — TELEPHONE ENCOUNTER
Abida Waterman M.D.   You 28 minutes ago (9:49 AM)      Tx u!!   Sure - they can leave lower dose metop as is and try imdur.   We talked about calling instead of er ic gina recurs     Happy monday and let me know! (Routing comment)      Called pt, s/w pt and wife, discussed Dr Waterman recommendations, pt agreed and verbalizes understanding    MAR updated.

## 2019-03-18 NOTE — ED NOTES
Repeat troponin sent, repeat ekg completed and shown to ERP, Dr. Call at [facility]. Pt's wife appears anxious, all questions answered, pt and family needs met at this time.

## 2019-03-20 ENCOUNTER — TELEPHONE (OUTPATIENT)
Dept: CARDIOLOGY | Facility: MEDICAL CENTER | Age: 74
End: 2019-03-20

## 2019-03-20 NOTE — TELEPHONE ENCOUNTER
pulse pounding & anxious middle of last ntie   Received: Today   Message Contents   Lorena GAGE Brown R.N.   Phone Number: [phone]             SC/chiquis     Pt's wife Patti had episode of pulse pounding during middle of last nite & was very anxious.  Pt feels dizzy this morning.  Patti thinks it is related to the isosorbide mononitrate SR (IMDUR) 30 MG TABLET SR 24 HR.   Please call Patti at         Called and s/w wife. She said that she wanted to talk to Traci and Dr. Waterman.     Wife reiterated the above episode and says that the pt decided to go back to taking a full 25mg metoprolol BID since this morning and feeling better today with no additional episodes of bounding pulse. Wife says that pt would prefer to stop imdur and continue on full dose of metoprolol but would like Dr. Waterman's approval. He would continue to take the nitro SL when needed for CP.      To Traci BOURGEOIS and Dr. Waterman

## 2019-03-20 NOTE — TELEPHONE ENCOUNTER
Called pt, s/w pt and wife, discussed Dr Waterman recommendations, pt is wondering if there is another kind of long acting nitrate as it seems to help him with his BP and his symptoms despite of side effects. Informed pt and wife will discussed w/ Dr Waterman and will let them know.     Discussed w/ Dr Waterman, per Dr Waterman we could try Ranexa or NTG patch.     Called pt, discussed Dr Waterman recommendations, pt would like to try the full dose Metoprolol first at this time and see how he does with it, he would call us back if he wanted to try Ranexa or NTG patch or if he develops any further symptoms

## 2019-03-21 ENCOUNTER — OFFICE VISIT (OUTPATIENT)
Dept: VASCULAR LAB | Facility: MEDICAL CENTER | Age: 74
End: 2019-03-21
Attending: FAMILY MEDICINE
Payer: MEDICARE

## 2019-03-21 ENCOUNTER — ANTICOAGULATION MONITORING (OUTPATIENT)
Dept: VASCULAR LAB | Facility: MEDICAL CENTER | Age: 74
End: 2019-03-21

## 2019-03-21 VITALS
DIASTOLIC BLOOD PRESSURE: 75 MMHG | WEIGHT: 227 LBS | BODY MASS INDEX: 34.4 KG/M2 | HEIGHT: 68 IN | SYSTOLIC BLOOD PRESSURE: 126 MMHG | HEART RATE: 74 BPM

## 2019-03-21 DIAGNOSIS — I20.89 ANGINA EFFORT: ICD-10-CM

## 2019-03-21 DIAGNOSIS — E11.8 TYPE 2 DIABETES MELLITUS WITH COMPLICATION, WITH LONG-TERM CURRENT USE OF INSULIN (HCC): Chronic | ICD-10-CM

## 2019-03-21 DIAGNOSIS — E78.2 MIXED HYPERLIPIDEMIA: Chronic | ICD-10-CM

## 2019-03-21 DIAGNOSIS — Z87.891 PERSONAL HISTORY OF TOBACCO USE: ICD-10-CM

## 2019-03-21 DIAGNOSIS — E55.9 VITAMIN D DEFICIENCY: ICD-10-CM

## 2019-03-21 DIAGNOSIS — Z79.4 TYPE 2 DIABETES MELLITUS WITH COMPLICATION, WITH LONG-TERM CURRENT USE OF INSULIN (HCC): Chronic | ICD-10-CM

## 2019-03-21 DIAGNOSIS — I73.9 PERIPHERAL ARTERIAL DISEASE (HCC): Chronic | ICD-10-CM

## 2019-03-21 DIAGNOSIS — G47.33 OBSTRUCTIVE SLEEP APNEA: ICD-10-CM

## 2019-03-21 DIAGNOSIS — I25.110 CORONARY ARTERY DISEASE INVOLVING NATIVE CORONARY ARTERY OF NATIVE HEART WITH UNSTABLE ANGINA PECTORIS (HCC): Chronic | ICD-10-CM

## 2019-03-21 DIAGNOSIS — Z95.5 H/O RIGHT CORONARY ARTERY STENT PLACEMENT: Chronic | ICD-10-CM

## 2019-03-21 DIAGNOSIS — I10 BENIGN ESSENTIAL HTN: Chronic | ICD-10-CM

## 2019-03-21 DIAGNOSIS — Z78.9 STATIN INTOLERANCE: Chronic | ICD-10-CM

## 2019-03-21 PROCEDURE — 99204 OFFICE O/P NEW MOD 45 MIN: CPT | Performed by: FAMILY MEDICINE

## 2019-03-21 PROCEDURE — 99212 OFFICE O/P EST SF 10 MIN: CPT | Performed by: FAMILY MEDICINE

## 2019-03-21 ASSESSMENT — ENCOUNTER SYMPTOMS
DOUBLE VISION: 0
NAUSEA: 0
ORTHOPNEA: 0
BLOOD IN STOOL: 0
SHORTNESS OF BREATH: 0
DIARRHEA: 0
BRUISES/BLEEDS EASILY: 0
COUGH: 0
DEPRESSION: 0
FEVER: 0
BLURRED VISION: 0
SEIZURES: 0
ABDOMINAL PAIN: 0
FOCAL WEAKNESS: 0
DIZZINESS: 0
WEAKNESS: 0
CHILLS: 0
VOMITING: 0
INSOMNIA: 0
HEADACHES: 0
SORE THROAT: 0
TREMORS: 0
PALPITATIONS: 0
WHEEZING: 0
HEMOPTYSIS: 0
MYALGIAS: 0
NERVOUS/ANXIOUS: 0

## 2019-03-21 NOTE — PROGRESS NOTES
INITIAL VASCULAR MED VISIT  Subjective:   Brijesh Brown is a 73 y.o. male who presents today 3/21/2019 for   Chief Complaint   Patient presents with   • New Patient     Hyperlipidemia   Referred by cardiology for eval and mgmt of HLD in context of statin intolerance and complex CAD, T2D, HTN.     HPI:    CAD:   Hx of complex inoperable multi-vessel CAD.  Had cath last month and no interventions were provided due to complexity.  Had f/u with cardiology and recommend for PCSK9i due to statin intolerance and need for aggressive risk factor reduction.     Hyperlipidemia:  Stable, no current concerns  Current treatment: Repatha, started 2/23/19 and using every 2 weeks   Prior statin: had flu-like sx, myalgias on atorvastatin, rosuvastatin, lovastatin, pravastatin - all intolerant.  Reports trial of 6 different statins and failed.  Had previously used Repatha with good response and met goals.    Baseline cholesterol (2/8/19):  , , HDL 42, .  Highest LDL in Epic since 2013 was 185.    Myalgias? No  Other adverse drug reactions? No  Lipid profile:   Bsaeline (6/2014) , , HDL 44, TG 95  LDL   Date Value   02/08/2019 179 mg/dL (H)   03/29/2018 160 mg/dL (H)   06/26/2015 182 mg/dL (H)   10/01/2013 170     HDL   Date Value   02/08/2019 42 mg/dL   03/29/2018 38 mg/dL (L)   06/26/2015 42 mg/dL   10/01/2013 44     HTN:  Current HTN concerns:  Decreased energy with metoprolol.   Current ADRs: Yes as noted   HTN sx:  No current blurred or changed vision, chest pain, shortness of breath, headache, nausea, dizziness/vertigo   Home BP log: not checking   24h ABPM completed: not tested  Adherence to current HTN meds: compliant all of the time     Antiplatelet/anticoagulation: Yes, Details: ASA 81mg w/o bleeding/bruising     PAD: able to walk 5-10min and mild calf pain, resolved within 20s at rest. No rest pain or limb cyanosis.      T2D: Using metformin and tresiba.  Fasting AM sugars = 105-117.   Not using metformin.  No changes in dietary habits.      Pertinent HTN hx (reviewed at initial visit):   Age at HTN dx: >5yrs   Past HTN medications: as noted only   HTN crises:  No prior hospitalization or crises   Lifestyle factors:     High salt: No   EtOH: No  Interfering substances:     NSAIDs: No    Decongestants. No   Stimulants/drugs: No    Clinical evidence of ASCVD:     1) hx of MI/ACS:  No   2) coronary or other revascularization procedure: Yes, Details: recently last month    3) TIA/ischemic CVA: No   4) PAD (including KWADWO <0.9): Yes, Details: symptomatic BLE    5) documented (CAD, Renal athero, AA due to athero, carotid plaque >50% stenosis: No    Major RFs:     1) Age (Men>44, women>54):  yes   2) Fhx early CAD (<55 men, <65 women):  no   3) cigarette smoking:  No   4) high BP >139/89 or on tx: yes   5) Low HDL-C (<40 men, <50 women):  no    Other ASCVD risk factors:     CKD:  No   Sleeping disorder/CHERIE: No   Hypothyroidism: No    Past Medical History:   Diagnosis Date   • Benign essential HTN 4/23/2012   • CAD (coronary artery disease)     S/P NIKKI X 3 in '04   • CVA, old, disturbances of vision     right eye   • Depression 4/23/2012   • Diabetes (HCC)    • Myalgia    • Other and unspecified hyperlipidemia 12/20/2013   • Overweight(278.02) 4/23/2012   • PVD (peripheral vascular disease) (HCC) 4/23/2012     Past Surgical History:   Procedure Laterality Date   • INCISION HERNIA REPAIR     • ZZZ CARDIAC CATH       Family History   Problem Relation Age of Onset   • Lung Disease Mother    • Heart Disease Father         CABG x3, then re-do CABG x 4   • Diabetes Sister    • Heart Disease Sister      History   Smoking Status   • Former Smoker   • Packs/day: 1.00   • Years: 20.00   • Types: Cigarettes   • Quit date: 1/1/1984   Smokeless Tobacco   • Never Used     Comment: quit '84, 20 py history     Social History   Substance Use Topics   • Smoking status: Former Smoker     Packs/day: 1.00     Years: 20.00      Types: Cigarettes     Quit date: 1/1/1984   • Smokeless tobacco: Never Used      Comment: quit '84, 20 py history   • Alcohol use No     Outpatient Encounter Prescriptions as of 3/21/2019   Medication Sig Dispense Refill   • metoprolol (LOPRESSOR) 25 MG Tab Take 1 Tab by mouth 2 times a day.     • Evolocumab (REPATHA) 140 MG/ML Solution Prefilled Syringe Inject 140 mg as instructed every 14 days for 360 days. 6 Syringe 3   • nitroglycerin (NITROSTAT) 0.4 MG SL Tab Place 1 Tab under tongue as needed for Chest Pain. 25 Tab 0   • metFORMIN (GLUCOPHAGE) 500 MG Tab Take 1,000 mg by mouth 2 times a day, with meals.     • zolpidem (AMBIEN) 5 MG Tab Take 5 mg by mouth at bedtime as needed for Sleep.     • aspirin EC (ECOTRIN) 81 MG Tablet Delayed Response Take 1 Tab by mouth every day. 30 Tab 11   • Insulin Degludec (TRESIBA FLEXTOUCH) 200 UNIT/ML Solution Pen-injector Inject 26 Units as instructed every evening.     • [DISCONTINUED] isosorbide mononitrate SR (IMDUR) 30 MG TABLET SR 24 HR Take 1 Tab by mouth every morning. (Patient not taking: Reported on 3/21/2019) 90 Tab 3   • [DISCONTINUED] metoprolol (LOPRESSOR) 25 MG Tab Take 0.5 Tabs by mouth 2 Times a Day. 180 Tab 3   • [DISCONTINUED] Evolocumab (REPATHA) 140 MG/ML Solution Prefilled Syringe Inject 140 mg as instructed every 14 days. 2 Syringe 0     No facility-administered encounter medications on file as of 3/21/2019.      Allergies   Allergen Reactions   • Statins [Hmg-Coa-R Inhibitors]      Caused muscle pain     DIET AND EXERCISE:  Weight Change:   BMI Readings from Last 4 Encounters:   03/21/19 34.52 kg/m²   03/18/19 34.53 kg/m²   02/19/19 33.91 kg/m²   02/10/19 35.20 kg/m²      Diet: low carbohydrate  Exercise: no regular exercise program     Review of Systems   Constitutional: Negative for chills, fever and malaise/fatigue.   HENT: Negative for nosebleeds, sore throat and tinnitus.    Eyes: Negative for blurred vision and double vision.   Respiratory:  "Negative for cough, hemoptysis, shortness of breath and wheezing.    Cardiovascular: Negative for chest pain, palpitations, orthopnea and leg swelling.   Gastrointestinal: Negative for abdominal pain, blood in stool, diarrhea, melena, nausea and vomiting.   Genitourinary: Negative for hematuria.   Musculoskeletal: Negative for joint pain and myalgias.   Skin: Negative for itching and rash.   Neurological: Negative for dizziness, tremors, focal weakness, seizures, weakness and headaches.   Endo/Heme/Allergies: Does not bruise/bleed easily.   Psychiatric/Behavioral: Negative for depression. The patient is not nervous/anxious and does not have insomnia.       Objective:     Vitals:    03/21/19 1052   BP: 126/75   BP Location: Left arm   Patient Position: Sitting   BP Cuff Size: Adult   Pulse: 74   Weight: 103 kg (227 lb)   Height: 1.727 m (5' 8\")      BP Readings from Last 4 Encounters:   03/21/19 126/75   03/18/19 143/70   02/19/19 108/64   02/11/19 125/72      Body mass index is 34.52 kg/m².  Physical Exam   Constitutional: He is oriented to person, place, and time. He appears well-developed and well-nourished. He is cooperative. No distress.   HENT:   Head: Normocephalic and atraumatic.   Mouth/Throat: Oropharynx is clear and moist and mucous membranes are normal.   Eyes: Pupils are equal, round, and reactive to light. Conjunctivae are normal.   Neck: Trachea normal and normal range of motion. Neck supple. Normal carotid pulses and no JVD present. Carotid bruit is not present. No thyromegaly present.   Cardiovascular: Normal rate, regular rhythm, normal heart sounds and intact distal pulses.  Exam reveals no gallop and no friction rub.    No murmur heard.  Pulses:       Carotid pulses are 2+ on the right side, and 2+ on the left side.       Radial pulses are 2+ on the right side, and 2+ on the left side.        Dorsalis pedis pulses are 2+ on the right side, and 2+ on the left side.        Posterior tibial pulses " are 2+ on the right side, and 2+ on the left side.   Edema:    RLE: none     LLE: none   Spider telangectasia:       RLE:  None      LLE: none   Varicosities:         RLE: none      LLE: none   Corona phlebectatica:      RLE:  None        LLE:  None   Cording:         RLE:  None     LLE: None    Pulmonary/Chest: Effort normal and breath sounds normal. No respiratory distress.   Abdominal: Soft. Bowel sounds are normal. He exhibits no distension and no mass. There is no hepatosplenomegaly. There is no tenderness. There is no rebound and no guarding.   Musculoskeletal: He exhibits no edema.   Lymphadenopathy:     He has no cervical adenopathy.   Neurological: He is alert and oriented to person, place, and time. No cranial nerve deficit. Coordination and gait normal.   Skin: Skin is warm and dry. He is not diaphoretic. No cyanosis. No pallor. Nails show no clubbing.   Psychiatric: He has a normal mood and affect.     Lab Results   Component Value Date    CHOLSTRLTOT 245 (H) 02/08/2019     (H) 02/08/2019    HDL 42 02/08/2019    TRIGLYCERIDE 121 02/08/2019      Lab Results   Component Value Date    PROTHROMBTM 12.9 02/08/2019    INR 0.96 02/08/2019       Lab Results   Component Value Date    HBA1C 9.0 (H) 02/08/2019      Lab Results   Component Value Date    SODIUM 141 03/18/2019    POTASSIUM 4.2 03/18/2019    CHLORIDE 107 03/18/2019    CO2 24 03/18/2019    GLUCOSE 139 (H) 03/18/2019    BUN 27 (H) 03/18/2019    CREATININE 0.70 03/18/2019    IFAFR[place] >60 03/18/2019    IFNOTAFR >60 03/18/2019        Lab Results   Component Value Date    WBC 10.4 03/18/2019    RBC 4.01 (L) 03/18/2019    HEMOGLOBIN 13.9 (L) 03/18/2019    HEMATOCRIT 40.0 (L) 03/18/2019    MCV 99.8 (H) 03/18/2019    MCH 34.7 (H) 03/18/2019    MCHC 34.8 03/18/2019    MPV 11.6 03/18/2019      VASCULAR IMAGING:     Echo 2/9/19  Compared to the images of the study done 2/3/16 - there has been.   Normal left ventricular wall thickness.  Normal left  ventricular systolic function.  Left ventricular ejection fraction is visually estimated to be 55%.  Mildly dilated right ventricle.  Reduced right ventricular systolic function.  Normal left atrial size.  Trace mitral regurgitation.  Structurally normal aortic valve without significant stenosis or   regurgitation.  Unable to estimate pulmonary artery pressure due to an inadequate   tricuspid regurgitant jet.  Normal pericardium without effusion.    Bilat art duplex 3/12/19  Bilateral.  Moderate arterial insufficiency.     Medical Decision Making:  Today's Assessment / Status / Plan:     1. Mixed hyperlipidemia  Comp Metabolic Panel    Lipid Profile    Lipid Profile    Comp Metabolic Panel   2. Coronary artery disease involving native coronary artery of native heart with unstable angina pectoris (HCC)  metoprolol (LOPRESSOR) 25 MG Tab   3. H/O right coronary artery stent placement     4. Peripheral arterial disease (HCC)     5. Type 2 diabetes mellitus with complication, with long-term current use of insulin (HCC)  MICROALBUMIN CREAT RATIO URINE   6. Benign essential HTN     7. Statin intolerance     8. Personal history of tobacco use     9. Obstructive sleep apnea     10. Vitamin D deficiency     11. BMI 34.0-34.9,adult     12. Angina effort (HCC)  metoprolol (LOPRESSOR) 25 MG Tab     Patient Type: Secondary Prevention and DM    Etiology of Established CVD if Present:   1) CAD, s/p PCI and stenting     Lipid Management: Qualifies for Statin Therapy Based on 2013 ACC/AHA Guidelines: yes  Calculated 10-Year Risk of ASCVD: N/A  Currently on Statin: No, has multiple failures due to intolerance   Has tolerated Repatha.  Clearly meets  Criteria for use based upon secondary prevention of complex CAD and severely elevated LDL cholesterol along with multiple other risk factors including age, HTN, T2D.    NLA Risk Category:   Very high:  Evidence of ASCVD   Tx threshold:  non-HDL-C >99, LDL-C >69  Tx goal: non-HDL-C <100,   LDL-C <70  (optional: apoB <80)  At goal:  no  Tx plan:   - MA will assist with coordination of Repatha with specialty pharm and insurance   - check labs now   - continue Repatha 140mg Q2wk - new Rx sent to [facility]  - check labs in 6-8 weeks   - continue TLC measures   - consider addition of Zetia if needed     Blood Pressure Management:Goal: ACC/AHA (2017) goal <130/80  Home BP at goal:  Unsure   Office BP at goal:  yes  Plan:   - continue home BP monitoring, reviewed correct technique:  Yes   - order 24h ABPM:  NO  - monitor lytes/gfr routinely   - continue metoprolol 25mg BID  - consider addition of ACEi or ARB if BP uncontrolled     Glycemic Status: Diabetic, T2  Last A1c = 9.0% , 2/2019  Goal A1c = <7.5% reasonable based upon co-morbidities, ideal <7.0%   ACR not available   - recommmend for routine care with PCP (or endocrine) to include regular A1c monitoring, annual albumin/creatinine ratio (ACR), annual diabetic retinopathy screening, foot exams, annual flu vaccine, and updates to pneumonia vaccines as appropriate   - defer mgmt to PCP       Anti-Platelet/Anti-Coagulant Tx: yes  - continue ASA 81mg daily     Smoking: continued complete avoidance of all tobacco products     Physical Activity: continue healthy activity to improve CV fitness, see care instructions     Weight Management and Nutrition: Dietary plan was discussed with patient at this visit including DASH, low sodium as outlined in care instructions     Other:   1) PAD, moderate per arterial duplex with claudication, no signs of limb ischemia or rest pain   - monitor closely   - continue aggressive med management   - check duplex and refer for intervention if developing signs of limb ischemia    2) CHERIE - continue CPAP, defer mgmt to PCP and pulm    3) vit D deficiency - continue vit D3 4000 units daily     Instructed to follow-up with PCP for remainder of adult medical needs: yes  We will partner with other providers in the management of  established vascular disease and cardiometabolic risk factors.    Studies to Be Obtained: BLE art duplex in 2-3yrs or if sx development   Labs to Be Obtained: CMP, lipid now;  Repeat CMP, lipid in 6 weeks     Follow up in: 2 months with me     Yoshi Tamayo M.D.

## 2019-03-21 NOTE — PATIENT INSTRUCTIONS
1) vitamin 4000 units daily   2) check labs currentlly and then again in about 8 weeks from now   3) [facility] will assist to get prescription   4) we can assist at our diabetes clinic if you would like     General healthly nutrition advice:  - the USDA food pattern (https://www.cnpp.usda.gov/USDAFoodPatterns)  - plate method (https://www.choosemyplate.gov/)  - consume diet that emphasizes intake of vegetables, fruits, and whole grains,  - use low fat diary products, poultry, fish, legumes, nontropical vegetable oils, nuts  - limit intake of sweets, sugar-sweetned beverages, and red meats   - reduce saturated and trans fats to <6% of your daily calories   - consume no more than 2,400mg of sodium daily (look at food labels) or if you have high BP then reduce to no more than 1,500mg of sodium daily     BP lowering diet:   - DASH diet (https://www.heart.org/en/health-topics/high-blood-pressure/changes-you-can-make-to-manage-high-blood-pressure/managing-blood-pressure-with-a-heart-healthy-diet)    Cholesterol-reducing diets:   - AHA diet  (http://www.heart.org/en/healthy-living/healthy-eating/eat-smart/nutrition-basics/aha-diet-and-lifestyle-recommendations)   - Mediterranean diet (http://www.heart.org/en/healthy-living/healthy-eating/eat-smart/nutrition-basics/mediterranean-diet)   - lowering triglycerides: (http://my.americanheart.org/idc/groups/ahamah-public/@wc/@sop/@[facility]/documents/downloadable/[facility]_425988.pdf)     - engage in moderate to mild-moderate physical activity such as walking for 10-15min/day, 3 to 5 times weekly or as directed at today's visit

## 2019-03-22 ENCOUNTER — APPOINTMENT (OUTPATIENT)
Dept: VASCULAR LAB | Facility: MEDICAL CENTER | Age: 74
End: 2019-03-22
Payer: MEDICARE

## 2019-03-23 LAB
ALBUMIN SERPL-MCNC: 4.1 G/DL (ref 3.5–4.8)
ALBUMIN/CREAT UR: 6.5 MG/G CREAT (ref 0–30)
ALBUMIN/GLOB SERPL: 1.6 {RATIO} (ref 1.2–2.2)
ALP SERPL-CCNC: 84 IU/L (ref 39–117)
ALT SERPL-CCNC: 21 IU/L (ref 0–44)
AST SERPL-CCNC: 16 IU/L (ref 0–40)
BILIRUB SERPL-MCNC: 0.7 MG/DL (ref 0–1.2)
BUN SERPL-MCNC: 14 MG/DL (ref 8–27)
BUN/CREAT SERPL: 13 (ref 10–24)
CALCIUM SERPL-MCNC: 9.1 MG/DL (ref 8.6–10.2)
CHLORIDE SERPL-SCNC: 105 MMOL/L (ref 96–106)
CHOLEST SERPL-MCNC: 125 MG/DL (ref 100–199)
CO2 SERPL-SCNC: 20 MMOL/L (ref 20–29)
CREAT SERPL-MCNC: 1.07 MG/DL (ref 0.76–1.27)
CREAT UR-MCNC: 144.1 MG/DL
GLOBULIN SER CALC-MCNC: 2.6 G/DL (ref 1.5–4.5)
GLUCOSE SERPL-MCNC: 133 MG/DL (ref 65–99)
HDLC SERPL-MCNC: 44 MG/DL
LABORATORY COMMENT REPORT: NORMAL
LDLC SERPL CALC-MCNC: 66 MG/DL (ref 0–99)
MICROALBUMIN UR-MCNC: 9.4 UG/ML
POTASSIUM SERPL-SCNC: 4.1 MMOL/L (ref 3.5–5.2)
PROT SERPL-MCNC: 6.7 G/DL (ref 6–8.5)
SODIUM SERPL-SCNC: 139 MMOL/L (ref 134–144)
TRIGL SERPL-MCNC: 76 MG/DL (ref 0–149)
VLDLC SERPL CALC-MCNC: 15 MG/DL (ref 5–40)

## 2019-03-25 ENCOUNTER — TELEPHONE (OUTPATIENT)
Dept: VASCULAR LAB | Facility: MEDICAL CENTER | Age: 74
End: 2019-03-25

## 2019-03-25 NOTE — TELEPHONE ENCOUNTER
Spoke with [facility] regarding patients Repatha. Co pay for Repatha 3 month supply is $150. There is already a PA on file that is good until 5/4/19. Spoke with patient and his wife about making that payment for this time until we can renew the PA and see if he qualifies for [employer]. Patient states that he is ok to make that co payment and will call [facility] today 3/25/18 to set up shipment.

## 2019-03-28 ENCOUNTER — TELEPHONE (OUTPATIENT)
Dept: VASCULAR LAB | Facility: MEDICAL CENTER | Age: 74
End: 2019-03-28

## 2019-03-28 NOTE — TELEPHONE ENCOUNTER
Received a fax communication from [facility], patient did NOT set up shipment as the rx at [facility] was for the syringe and not the sureclick. Called in rx for sureclick. Will wait to see if we need to do a new pa.

## 2019-04-01 ENCOUNTER — TELEPHONE (OUTPATIENT)
Dept: CARDIOLOGY | Facility: MEDICAL CENTER | Age: 74
End: 2019-04-01

## 2019-04-01 DIAGNOSIS — I20.89 ANGINA EFFORT: ICD-10-CM

## 2019-04-01 NOTE — TELEPHONE ENCOUNTER
Called pt, s/w pt and wife, lengthy conversation with pt and wife, wife reports on Sat night pt's BP is elevated w/ reported , pt freaks out and anxious about it so he took extra dose of Metoprolol 25mg, he also felt some chest pressure so he took NTG SL x 2, this morning his BP been 130-142/74-80 so he took his Metoprolol dose at 6 then at noon again, BP now 108/40, wife reports he feels lousy and tired and wipe out now. Educated pt and wife not to take extra dose of Metoprolol without MD recommendations or any meds, inquired how's pt's HR, wife reports pt's HR 70s, inquired also if they have thought about the NTG patch that Dr Waterman recommended from last telephone encounter (see previous notes). Advise that this would help pt's symptoms and could help with his BP as well, wife and pt agreed to give it a try. Advise also to wait and make sure pt is calm and relax before taking pt's BP. Wife appreciative of information and verbalizes understanding    To Dr Waterman, they would like to try NTG patch (usual dose 0.2mg/hr), ok to order? Any other recommendations?Thank You!

## 2019-04-01 NOTE — TELEPHONE ENCOUNTER
wife calling about patient's Metoprolol   Received: Today   Message Contents   WIN Duong/[place]       Patient's wife Patti is calling about his Metoprolol. She said he was having chest pains, so he increased his Metoprolol from 50 MGs daily to 100 MGs. She thinks it had to be brought to someone's attention, in case, this is too much. Pt. #[phone].

## 2019-04-02 RX ORDER — NITROGLYCERIN 40 MG/1
1 PATCH TRANSDERMAL DAILY
Qty: 30 PATCH | Refills: 6 | Status: SHIPPED | OUTPATIENT
Start: 2019-04-02 | End: 2019-04-02 | Stop reason: SDUPTHER

## 2019-04-02 RX ORDER — NITROGLYCERIN 40 MG/1
1 PATCH TRANSDERMAL DAILY
Qty: 30 PATCH | Refills: 6 | Status: SHIPPED | OUTPATIENT
Start: 2019-04-02 | End: 2019-04-04

## 2019-04-02 NOTE — TELEPHONE ENCOUNTER
Lorena Clements R.N.   Phone Number: [phone]             Traci     Pt's daughter Christina calling back to ask you to re-route the patch script to: [facility] at [address] [place] [address] ph# [phone], [facility] does not have this in stock today and pt wants to get on the patch today.     If questions         Rx sent to [facility] per pt's daughter request

## 2019-04-02 NOTE — TELEPHONE ENCOUNTER
WIN Hodge/Traci       Patient's daughter Christina said you spoke to her parents a few minutes ago. She has a few additional questions she would like to ask you. She can be reached at [phone] or [phone].      Called pt's daughter, lengthy conversation with daughter again, she's wondering if we could switch his beta blocker to something that would control his BP and angina but not give him the side effect (lousy/fatigue). Daughter reports that pt experienced Angina with just minimal exertion and was about to go to ER again the other night, I've discussed NTG patch or long acting NTG again with daughter as I've discussed with his patient and wife, she is willing to give it a try but also she is wondering if we could changed the Metoprolol?    To Dr Waterman

## 2019-04-02 NOTE — TELEPHONE ENCOUNTER
Called pt's daughter, discussed Dr Waterman recommendations, daughter agreed and verbalizes understanding.     New Rx for NTG patch sent to [facility].

## 2019-04-02 NOTE — TELEPHONE ENCOUNTER
Traci Umaña thatnk you enough!    Yes - no metoprolol.  My impression was that he refused it any way    NTG patch fine.as you suggested dose.

## 2019-04-02 NOTE — TELEPHONE ENCOUNTER
Lorena Clements R.N.   Phone Number: [phone]             [place]/Traci     Pt's daughter Christina calling to follow up on yesterday's conversation about metoprolol.  Please call Christina [phone] or       Called pt's daughter back, per daughter, her Mom just called her and told her that pt continued to take Metoprolol 25mg PO every 6hrs, per daughter, pt is afraid of another Angina attack so he's been taking this much Metoprolol, Inquired how's BP and HR with pt taking this extra Metoprolol, per daughter pt's /70, HR 60-80s. Advise daughter that would not give recommendations at this time that pt could continue this dosing, would follow what the original Rx is, advise daughter that will await for Dr Waterman for further recommendations.

## 2019-04-03 ENCOUNTER — APPOINTMENT (OUTPATIENT)
Dept: RADIOLOGY | Facility: MEDICAL CENTER | Age: 74
End: 2019-04-03
Payer: MEDICARE

## 2019-04-03 ENCOUNTER — TELEPHONE (OUTPATIENT)
Dept: CARDIOLOGY | Facility: MEDICAL CENTER | Age: 74
End: 2019-04-03

## 2019-04-03 ENCOUNTER — APPOINTMENT (OUTPATIENT)
Dept: RADIOLOGY | Facility: MEDICAL CENTER | Age: 74
End: 2019-04-03
Attending: EMERGENCY MEDICINE
Payer: MEDICARE

## 2019-04-03 ENCOUNTER — HOSPITAL ENCOUNTER (EMERGENCY)
Facility: MEDICAL CENTER | Age: 74
End: 2019-04-04
Attending: EMERGENCY MEDICINE
Payer: MEDICARE

## 2019-04-03 DIAGNOSIS — I20.0 UNSTABLE ANGINA (HCC): ICD-10-CM

## 2019-04-03 LAB
BASOPHILS # BLD AUTO: 0.2 % (ref 0–1.8)
BASOPHILS # BLD: 0.02 K/UL (ref 0–0.12)
EKG IMPRESSION: NORMAL
EOSINOPHIL # BLD AUTO: 0.12 K/UL (ref 0–0.51)
EOSINOPHIL NFR BLD: 1.5 % (ref 0–6.9)
ERYTHROCYTE [DISTWIDTH] IN BLOOD BY AUTOMATED COUNT: 44.1 FL (ref 35.9–50)
HCT VFR BLD AUTO: 39.8 % (ref 42–52)
HGB BLD-MCNC: 13.9 G/DL (ref 14–18)
IMM GRANULOCYTES # BLD AUTO: 0.03 K/UL (ref 0–0.11)
IMM GRANULOCYTES NFR BLD AUTO: 0.4 % (ref 0–0.9)
LYMPHOCYTES # BLD AUTO: 1.69 K/UL (ref 1–4.8)
LYMPHOCYTES NFR BLD: 20.4 % (ref 22–41)
MCH RBC QN AUTO: 34.6 PG (ref 27–33)
MCHC RBC AUTO-ENTMCNC: 34.9 G/DL (ref 33.7–35.3)
MCV RBC AUTO: 99 FL (ref 81.4–97.8)
MONOCYTES # BLD AUTO: 0.72 K/UL (ref 0–0.85)
MONOCYTES NFR BLD AUTO: 8.7 % (ref 0–13.4)
NEUTROPHILS # BLD AUTO: 5.69 K/UL (ref 1.82–7.42)
NEUTROPHILS NFR BLD: 68.8 % (ref 44–72)
NRBC # BLD AUTO: 0 K/UL
NRBC BLD-RTO: 0 /100 WBC
PLATELET # BLD AUTO: 167 K/UL (ref 164–446)
PMV BLD AUTO: 11.1 FL (ref 9–12.9)
RBC # BLD AUTO: 4.02 M/UL (ref 4.7–6.1)
WBC # BLD AUTO: 8.3 K/UL (ref 4.8–10.8)

## 2019-04-03 PROCEDURE — 84484 ASSAY OF TROPONIN QUANT: CPT

## 2019-04-03 PROCEDURE — 93005 ELECTROCARDIOGRAM TRACING: CPT

## 2019-04-03 PROCEDURE — 36415 COLL VENOUS BLD VENIPUNCTURE: CPT

## 2019-04-03 PROCEDURE — 85025 COMPLETE CBC W/AUTO DIFF WBC: CPT

## 2019-04-03 PROCEDURE — 93005 ELECTROCARDIOGRAM TRACING: CPT | Performed by: EMERGENCY MEDICINE

## 2019-04-03 PROCEDURE — 99285 EMERGENCY DEPT VISIT HI MDM: CPT

## 2019-04-03 PROCEDURE — 71045 X-RAY EXAM CHEST 1 VIEW: CPT

## 2019-04-03 RX ORDER — AMLODIPINE BESYLATE 2.5 MG/1
2.5 TABLET ORAL DAILY
Qty: 90 TAB | Refills: 3 | Status: SHIPPED | OUTPATIENT
Start: 2019-04-03 | End: 2019-05-21

## 2019-04-03 ASSESSMENT — LIFESTYLE VARIABLES: DO YOU DRINK ALCOHOL: NO

## 2019-04-03 NOTE — TELEPHONE ENCOUNTER
WIN Hodge/Traci       Patient daughter Christina is calling again with a few more questions about her dad's blood pressure. She can be reached at [phone].      Called pt's daughter (Christina), daughter reports NTG patch works, yesterday /65, pt is not having Angina anymore although pt's BP today 138/85, they are worried that pt will have another angina attack if his BP remains this elevated, reassurance provided to daughter, advise to cont BP at this time since we just started the NTG patch and if it is consistently >130/80 to please give us a call back. Daughter is insistent of changing pt's Metoprolol dose or changing it to long acting or different beta blocker. Informed daughter will discussed w/ Dr Waterman and will let her know of her recommendations.     To Dr Waterman

## 2019-04-03 NOTE — TELEPHONE ENCOUNTER
Flor Delvalle, Med Ass't  Traci Clements R.N.   Phone Number: [phone]             [place]     Pt's daughter Christina is calling back again, she says she forgot to ask you a question from today's phone call.   BP/pulse is continuing to rise. BP is 150/85 & pulse is 76. She is asking if he needs to take more metoprolol, she wants to prevent angina attacks.   # [phone]

## 2019-04-03 NOTE — TELEPHONE ENCOUNTER
Discussed pt's daughter concerns w/ Dr Waterman, per Dr Waterman, may add CCB-Amlodipine 2.5mg PO daily    Called pt's daughter (Christina), discussed Dr Waterman recommendations, education regarding new meds provided to daughter, she verbalizes understanding.     New Rx for Amlodipine 2.5mg PO daily sent to [facility] per daughter request

## 2019-04-04 ENCOUNTER — TELEPHONE (OUTPATIENT)
Dept: CARDIOLOGY | Facility: MEDICAL CENTER | Age: 74
End: 2019-04-04

## 2019-04-04 ENCOUNTER — TELEPHONE (OUTPATIENT)
Dept: VASCULAR LAB | Facility: MEDICAL CENTER | Age: 74
End: 2019-04-04

## 2019-04-04 VITALS
TEMPERATURE: 97.9 F | HEIGHT: 69 IN | SYSTOLIC BLOOD PRESSURE: 124 MMHG | BODY MASS INDEX: 33.33 KG/M2 | HEART RATE: 69 BPM | DIASTOLIC BLOOD PRESSURE: 71 MMHG | WEIGHT: 225 LBS | OXYGEN SATURATION: 96 % | RESPIRATION RATE: 16 BRPM

## 2019-04-04 DIAGNOSIS — I20.9 ANGINA PECTORIS (HCC): ICD-10-CM

## 2019-04-04 DIAGNOSIS — I25.110 CORONARY ARTERY DISEASE INVOLVING NATIVE CORONARY ARTERY OF NATIVE HEART WITH UNSTABLE ANGINA PECTORIS (HCC): Chronic | ICD-10-CM

## 2019-04-04 PROBLEM — D64.9 NORMOCYTIC ANEMIA: Status: ACTIVE | Noted: 2019-04-04

## 2019-04-04 LAB
ALBUMIN SERPL BCP-MCNC: 3.8 G/DL (ref 3.2–4.9)
ALBUMIN/GLOB SERPL: 1.5 G/DL
ALP SERPL-CCNC: 72 U/L (ref 30–99)
ALT SERPL-CCNC: 24 U/L (ref 2–50)
ANION GAP SERPL CALC-SCNC: 9 MMOL/L (ref 0–11.9)
AST SERPL-CCNC: 17 U/L (ref 12–45)
BILIRUB SERPL-MCNC: 0.9 MG/DL (ref 0.1–1.5)
BUN SERPL-MCNC: 13 MG/DL (ref 8–22)
CALCIUM SERPL-MCNC: 8.9 MG/DL (ref 8.5–10.5)
CHLORIDE SERPL-SCNC: 105 MMOL/L (ref 96–112)
CO2 SERPL-SCNC: 19 MMOL/L (ref 20–33)
CREAT SERPL-MCNC: 0.9 MG/DL (ref 0.5–1.4)
GLOBULIN SER CALC-MCNC: 2.6 G/DL (ref 1.9–3.5)
GLUCOSE SERPL-MCNC: 147 MG/DL (ref 65–99)
POTASSIUM SERPL-SCNC: 3.6 MMOL/L (ref 3.6–5.5)
PROT SERPL-MCNC: 6.4 G/DL (ref 6–8.2)
SODIUM SERPL-SCNC: 133 MMOL/L (ref 135–145)
TROPONIN I SERPL-MCNC: <0.01 NG/ML (ref 0–0.04)

## 2019-04-04 PROCEDURE — 99284 EMERGENCY DEPT VISIT MOD MDM: CPT | Performed by: HOSPITALIST

## 2019-04-04 PROCEDURE — A9270 NON-COVERED ITEM OR SERVICE: HCPCS | Performed by: HOSPITALIST

## 2019-04-04 PROCEDURE — 80053 COMPREHEN METABOLIC PANEL: CPT

## 2019-04-04 PROCEDURE — 700102 HCHG RX REV CODE 250 W/ 637 OVERRIDE(OP): Performed by: HOSPITALIST

## 2019-04-04 RX ORDER — ISOSORBIDE DINITRATE 10 MG/1
10 TABLET ORAL 3 TIMES DAILY
Qty: 90 TAB | Refills: 0 | Status: SHIPPED | OUTPATIENT
Start: 2019-04-04 | End: 2019-04-04

## 2019-04-04 RX ORDER — METOPROLOL SUCCINATE 25 MG/1
25 TABLET, EXTENDED RELEASE ORAL DAILY
Qty: 30 TAB | Refills: 0 | Status: SHIPPED | OUTPATIENT
Start: 2019-04-04 | End: 2019-04-04 | Stop reason: SDUPTHER

## 2019-04-04 RX ORDER — ISOSORBIDE DINITRATE 10 MG/1
10 TABLET ORAL 3 TIMES DAILY
Qty: 90 TAB | Refills: 0 | Status: SHIPPED | OUTPATIENT
Start: 2019-04-04 | End: 2019-04-04 | Stop reason: SDUPTHER

## 2019-04-04 RX ORDER — ISOSORBIDE DINITRATE 10 MG/1
10 TABLET ORAL ONCE
Status: COMPLETED | OUTPATIENT
Start: 2019-04-04 | End: 2019-04-04

## 2019-04-04 RX ORDER — ISOSORBIDE DINITRATE 10 MG/1
10 TABLET ORAL 3 TIMES DAILY
Qty: 270 TAB | Refills: 3 | Status: SHIPPED | OUTPATIENT
Start: 2019-04-04 | End: 2019-04-12 | Stop reason: SDUPTHER

## 2019-04-04 RX ORDER — METOPROLOL SUCCINATE 25 MG/1
25 TABLET, EXTENDED RELEASE ORAL DAILY
Qty: 90 TAB | Refills: 3 | Status: SHIPPED | OUTPATIENT
Start: 2019-04-04 | End: 2019-04-12 | Stop reason: SDUPTHER

## 2019-04-04 RX ORDER — METOPROLOL SUCCINATE 25 MG/1
25 TABLET, EXTENDED RELEASE ORAL DAILY
Qty: 30 TAB | Refills: 0 | Status: SHIPPED | OUTPATIENT
Start: 2019-04-04 | End: 2019-04-04

## 2019-04-04 RX ADMIN — ISOSORBIDE DINITRATE 10 MG: 10 TABLET ORAL at 04:07

## 2019-04-04 ASSESSMENT — ENCOUNTER SYMPTOMS
RESPIRATORY NEGATIVE: 1
PSYCHIATRIC NEGATIVE: 1
CONSTITUTIONAL NEGATIVE: 1
GASTROINTESTINAL NEGATIVE: 1
EYES NEGATIVE: 1
NEUROLOGICAL NEGATIVE: 1
MUSCULOSKELETAL NEGATIVE: 1

## 2019-04-04 NOTE — ED NOTES
Pt med per mar. Reviewed dc inst & rxs at pharm. Verbalizes understanding. piv dc. Bleeding controlled. Wheeled to lobby via family.

## 2019-04-04 NOTE — ED NOTES
1st contact c pt. Resting on cart in nad. Family at bs. Denies any needs. Remains on monitor. Vss. Aware of pending labs. Will ctm.

## 2019-04-04 NOTE — CONSULTS
Hospital Medicine Consultation    Date of Service  4/4/2019    Referring Physician  No att. providers found    Consulting Physician  Sukhi Warren M.D.    Reason for Consultation  Chest pain    History of Presenting Illness  74 y.o. male with known coronary artery disease which is multivessel and not amenable to surgery or percutaneous intervention, diabetes mellitus with control on current medication regimen, and essential hypertension which is controlled, as well as dyslipidemia on Repatha therapy, was in his usual state of health until several days prior to admission.  He began to have increased episodes of chest pain, which she describes as a pressure, on the left side of his chest, sometimes rating to his left shoulder.  He reports no significant exacerbating factors, and after taking nitroglycerin multiple times limited relieving factors.  He reports that occasionally he will take metoprolol several times a day, which does seem to improve his chest pain overall.  He currently denies any headache or vision changes, he reports that his chest pain has subsided, he has no shortness of breath, abdominal pain, nausea vomiting, diarrhea or constipation.  He does follow closely with cardiology Dr. Waterman.    Review of Systems  Review of Systems   Constitutional: Negative.    HENT: Negative.    Eyes: Negative.    Respiratory: Negative.    Cardiovascular: Positive for chest pain.   Gastrointestinal: Negative.    Genitourinary: Negative.    Musculoskeletal: Negative.    Skin: Negative.    Neurological: Negative.    Endo/Heme/Allergies: Negative.    Psychiatric/Behavioral: Negative.        Past Medical History   has a past medical history of Angina at rest ([facility]); Benign essential HTN (4/23/2012); CAD (coronary artery disease); CVA, old, disturbances of vision; Depression (4/23/2012); Diabetes ([facility]); Myalgia; Other and unspecified hyperlipidemia (12/20/2013); Overweight(278.02) (4/23/2012); and PVD (peripheral vascular  disease) ([facility]) (4/23/2012).    Surgical History   has a past surgical history that includes zzz cardiac cath and incision hernia repair.    Family History  family history includes Diabetes in his sister; Heart Disease in his father and sister; Lung Disease in his mother.    Social History   reports that he quit smoking about 35 years ago. His smoking use included Cigarettes. He has a 20.00 pack-year smoking history. He has never used smokeless tobacco. He reports that he does not drink alcohol or use drugs.    Medications  Prior to Admission Medications   Prescriptions Last Dose Informant Patient Reported? Taking?   Evolocumab (REPATHA) 140 MG/ML Solution Prefilled Syringe   No No   Sig: Inject 140 mg as instructed every 14 days for 360 days.   Insulin Degludec (TRESIBA FLEXTOUCH) 200 UNIT/ML Solution Pen-injector  Patient Yes No   Sig: Inject 26 Units as instructed every evening.   amLODIPine (NORVASC) 2.5 MG Tab   No No   Sig: Take 1 Tab by mouth every day.   aspirin EC (ECOTRIN) 81 MG Tablet Delayed Response  Patient Yes No   Sig: Take 1 Tab by mouth every day.   metFORMIN (GLUCOPHAGE) 500 MG Tab  Patient Yes No   Sig: Take 1,000 mg by mouth 2 times a day, with meals.   metoprolol (LOPRESSOR) 25 MG Tab   No No   Sig: Take 1 Tab by mouth 2 times a day.   nitroglycerin (NITRODUR) 0.2 MG/HR PATCH 24 HR   No No   Sig: Apply 1 Patch to skin as directed every day.   nitroglycerin (NITROSTAT) 0.4 MG SL Tab   No No   Sig: Place 1 Tab under tongue as needed for Chest Pain.   zolpidem (AMBIEN) 5 MG Tab  Patient Yes No   Sig: Take 5 mg by mouth at bedtime as needed for Sleep.      Facility-Administered Medications: None       Allergies  Allergies   Allergen Reactions   • Statins [Hmg-Coa-R Inhibitors]      Caused muscle pain       Physical Exam  Temp:  [36.6 °C (97.9 °F)] 36.6 °C (97.9 °F)  Pulse:  [67-91] 67  Resp:  [13-20] 13  BP: (124)/(71) 124/71  SpO2:  [96 %-97 %] 96 %    Physical Exam   Constitutional: He is oriented  to person, place, and time. He appears well-developed and well-nourished. No distress.   HENT:   Head: Normocephalic and atraumatic.   Eyes: Pupils are equal, round, and reactive to light. Conjunctivae are normal.   Neck: Normal range of motion. Neck supple. No tracheal deviation present. No thyromegaly present.   Cardiovascular: Normal rate, regular rhythm and normal heart sounds.  Exam reveals no gallop and no friction rub.    No murmur heard.  Pulmonary/Chest: Effort normal and breath sounds normal. No respiratory distress. He has no wheezes.   Abdominal: Soft. Bowel sounds are normal. He exhibits no distension and no mass. There is no tenderness. There is no rebound and no guarding.   Musculoskeletal: Normal range of motion. He exhibits no edema.   Lymphadenopathy:     He has no cervical adenopathy.   Neurological: He is alert and oriented to person, place, and time. No cranial nerve deficit.   Skin: Skin is warm and dry. He is not diaphoretic.   Psychiatric: He has a normal mood and affect.   Nursing note and vitals reviewed.      Fluids       Laboratory  Recent Labs      04/03/19   2324   WBC  8.3   RBC  4.02*   HEMOGLOBIN  13.9*   HEMATOCRIT  39.8*   MCV  99.0*   MCH  34.6*   MCHC  34.9   RDW  44.1   PLATELETCT  167   MPV  11.1     Recent Labs      04/04/19   0203   SODIUM  133*   POTASSIUM  3.6   CHLORIDE  105   CO2  19*   GLUCOSE  147*   BUN  13   CREATININE  0.90   CALCIUM  8.9                     Imaging  DX-CHEST-PORTABLE (1 VIEW)   Final Result         1.  Bilateral basilar atelectasis, no focal infiltrate   2.  Atherosclerosis          Assessment/Plan  * Angina pectoris (HCC)- (present on admission)   Assessment & Plan    Chronic, with unclear medication compliance due to multiple side-effects.  Will start isordil 10mg three times daily, as well as change metoprolol to 25mg long acting, as patient has been self dosing every 6 hours with metoprolol tartrate.  Will also follow closely with cardiology   Abida Waterman.  Plan to discharge with this regimen, which has been sent to [facility] of choice.      Hyperlipidemia- (present on admission)   Assessment & Plan    Intolerant of statins, currently controlled with Repatha.      CAD (coronary artery disease)- (present on admission)   Assessment & Plan    Chronic, multi-vessel with unfortunate inability of further surgical or percutaneous therapy at this point.  Continue medical management.      Normocytic anemia   Assessment & Plan    Chronic disease with no bleed. Monitor.

## 2019-04-04 NOTE — ASSESSMENT & PLAN NOTE
Chronic, multi-vessel with unfortunate inability of further surgical or percutaneous therapy at this point.  Continue medical management.

## 2019-04-04 NOTE — TELEPHONE ENCOUNTER
Long conversation via telephone with patient and his wife Patti.  Daughter in the background    Set limitations and boundaries  Talked at length about plan of care and discharge decision making    Reviewed the fact that the patient has elected not to take maximal medical therapy for more than 20 years.  He has advanced obliterative coronary disease, not a candidate for intervention.    He endorsed the fact that he is very anxious.  He feels overwhelmed by his family's concern.  Talked at length about counseling and a family meeting.    I also told him that we at cardiology work as a care team.  My partners as well as my nurse are the people that enable this and I have been with them in the hospitalist throughout this.  They voiced understanding    I again told him to try the nitrates suggested by the hospitalist.  I think this has been called in but will check with my nurse this morning.

## 2019-04-04 NOTE — TELEPHONE ENCOUNTER
S/w daughter, per daughter Dr Abida Waterman had already called pt, she's unsure if they still want to keep the appointment for tomorrow, she will ask her parents and will give us a call back, she wishes to keep appt for now

## 2019-04-04 NOTE — ED NOTES
"Pt on call light, states his chest pressure has gone from a 2/10 to 3/10 to L chest, while at rest. Family at  interjecting \"he needs nitro, ITS GETTING WORSE, but not the pill, he needs the iv, its the only thing that works\" attempting to explain that iv ntg has not been ordered & will pass along pts change in status & requests to admitting md.   Dr jara aware, states cardiology will also be in to see pt this evening.  Pt & family aware that admitting md & cardiology will be seeing pt as soon as possible. Son at  \"I want to see the er md, the er can given iv ntg\" attempting to explain that initial er md has relinquished pt to admitting and dr jara is aware of pts increased chest pressure. pts vss. Remains on monitor. rr even non labored. Skin warm & dry. Denies any n/v. Will ctm.     "

## 2019-04-04 NOTE — ED NOTES
"Dr lozano at , informed pt on plan of care for admit. Verbalized understanding that cardiology has been contacted.   This rn informed pt & family that cmp specimen has hemolyzed, and need for additional collection. Pt states \"I knew she did something wrong.. Last week they maria teresa 12 tubes and this happened, and they had to send the lab person to draw it right from the vein.\" acknowledged pts concern & offered to have phleb come draw repeat lab. Pt agrees to this.       Pt son states \"were not sure if hes going to be admitted, we want to see cardiology, [facility], they should be oncall-rounding on the floor... last time he was here, he stabilized after a couple hours & we saw cardiology\". Attempting to explain plan of care for admitting hospitalist to see pt shortly for plan of care.   "

## 2019-04-04 NOTE — ED TRIAGE NOTES
Pt has had intermittent chest pain since 1300 this afternoon, has self medicated with a total of 4 sublingual nitro and a nitro patch which h was left on approx 2 hours.  Pt currently being treated for unstable angina.  C/o mild sob at this time, chest discomfort starts in left side radiates to left shoulder and left side of jaw.   rm performed charge called pt roomed.

## 2019-04-04 NOTE — ED NOTES
Informed by dr jara that dr burrell, cardiology is aware of pts status in er and will be seeing pt.

## 2019-04-04 NOTE — ED PROVIDER NOTES
"ED Provider Note    Scribed for Dr. Lv Simmons M.D. by Marcus Melvin. 4/3/2019  10:57 PM    Primary care provider: Sheridan Jackman M.D.  Means of arrival: Wheel Chair  History obtained from: Patient  History limited by: None    CHIEF COMPLAINT  Chief Complaint   Patient presents with   • Chest Pain   • Shortness of Breath       HPI  Brijesh Brown is a 74 y.o. male who presents to the Emergency Department for evaluation of intermittent left sided chest pain that intermittently radiates to his left shoulder described as \"pressure\" in quality onset at 1 PM today. The patient reports to have noticed the pain is exacerbated to a 4/10 in severity when he is hypertensive and reports his highest systolic pressure was in the 150's today. He has experienced similar episodes of chest pain with his hypertension in the past. The patient has taken 4 Nitros throughout the day with temporarily alleviation. He endorses mild shortness of breath at this time, but no leg swelling. The patient reports to take Amlodipine and Metoprolol daily, but has not been following Dr. Waterman's (Cardiology) instructions on the dosing for Metoprolol.  Apparently taken multiple doses of metoprolol today.  In addition the patient had removed his topical nitroglycerin as he states it did not seem to be doing any good.    REVIEW OF SYSTEMS  Pertinent positives include chest pain, left shoulder pain, and shortness of breath. Pertinent negatives include no leg swelling. As above, all other systems reviewed and are negative.   See HPI for further details.     PAST MEDICAL HISTORY   has a past medical history of Angina at rest ([facility]); Benign essential HTN (4/23/2012); CAD (coronary artery disease); CVA, old, disturbances of vision; Depression (4/23/2012); Diabetes ([facility]); Myalgia; Other and unspecified hyperlipidemia (12/20/2013); Overweight(278.02) (4/23/2012); and PVD (peripheral vascular disease) ([facility]) (4/23/2012).    SURGICAL HISTORY   has a past " "surgical history that includes zzz cardiac cath and incision hernia repair.    SOCIAL HISTORY  Social History   Substance Use Topics   • Smoking status: Former Smoker     Packs/day: 1.00     Years: 20.00     Types: Cigarettes     Quit date: 1/1/1984   • Smokeless tobacco: Never Used      Comment: quit '84, 20 py history   • Alcohol use No      History   Drug Use No       FAMILY HISTORY  Family History   Problem Relation Age of Onset   • Lung Disease Mother    • Heart Disease Father         CABG x3, then re-do CABG x 4   • Diabetes Sister    • Heart Disease Sister        CURRENT MEDICATIONS  Home Medications    **Home medications have not yet been reviewed for this encounter**         ALLERGIES  Allergies   Allergen Reactions   • Statins [Hmg-Coa-R Inhibitors]      Caused muscle pain       PHYSICAL EXAM  VITAL SIGNS: /71   Pulse 91   Temp 36.6 °C (97.9 °F) (Temporal)   Resp 20   Ht 1.74 m (5' 8.5\")   Wt 102.1 kg (225 lb)   SpO2 97%   BMI 33.71 kg/m²     Constitutional: Well developed, Well nourished, no distress, Non-toxic appearance.   HENT: Normocephalic, Atraumatic, Bilateral external ears normal, Oropharynx moist, No oral exudates.   Eyes: PERRLA, EOMI, Conjunctiva normal, No discharge.   Neck: No tenderness, Supple, No stridor.   Lymphatic: No lymphadenopathy noted.   Cardiovascular: Normal heart rate, Normal rhythm.   Thorax & Lungs: Clear to auscultation bilaterally, No respiratory distress, No wheezing, No crackles.   Abdomen: Soft, No tenderness, No masses, No pulsatile masses.   Skin: Warm, Dry, No erythema, No rash.   Extremities:, No edema No cyanosis.   Musculoskeletal: No tenderness to palpation or major deformities noted.  Intact distal pulses  Neurologic: Awake, alert. Moves all extremities spontaneously.  Psychiatric: Affect normal, Judgment normal, Mood normal.     LABS  Results for orders placed or performed during the hospital encounter of 04/03/19   CBC with Differential   Result " Value Ref Range    WBC 8.3 4.8 - 10.8 K/uL    RBC 4.02 (L) 4.70 - 6.10 M/uL    Hemoglobin 13.9 (L) 14.0 - 18.0 g/dL    Hematocrit 39.8 (L) 42.0 - 52.0 %    MCV 99.0 (H) 81.4 - 97.8 fL    MCH 34.6 (H) 27.0 - 33.0 pg    MCHC 34.9 33.7 - 35.3 g/dL    RDW 44.1 35.9 - 50.0 fL    Platelet Count 167 164 - 446 K/uL    MPV 11.1 9.0 - 12.9 fL    Neutrophils-Polys 68.80 44.00 - 72.00 %    Lymphocytes 20.40 (L) 22.00 - 41.00 %    Monocytes 8.70 0.00 - 13.40 %    Eosinophils 1.50 0.00 - 6.90 %    Basophils 0.20 0.00 - 1.80 %    Immature Granulocytes 0.40 0.00 - 0.90 %    Nucleated RBC 0.00 /100 WBC    Neutrophils (Absolute) 5.69 1.82 - 7.42 K/uL    Lymphs (Absolute) 1.69 1.00 - 4.80 K/uL    Monos (Absolute) 0.72 0.00 - 0.85 K/uL    Eos (Absolute) 0.12 0.00 - 0.51 K/uL    Baso (Absolute) 0.02 0.00 - 0.12 K/uL    Immature Granulocytes (abs) 0.03 0.00 - 0.11 K/uL    NRBC (Absolute) 0.00 K/uL   Troponin   Result Value Ref Range    Troponin I <0.01 0.00 - 0.04 ng/mL   EKG (NOW)   Result Value Ref Range    Report       [facility] Emergency Dept.    Test Date:  2019  Pt Name:    [facility]                  Department: ER  MRN:        [id number]                      Room:  Gender:     Male                         Technician: [id number]  :        [id number]                   Requested By:ER TRIAGE PROTOCOL  Order #:    [id number]                    Reading MD: ARIANNE DEMPSEY MD    Measurements  Intervals                                Axis  Rate:       65                           P:          29  ID:         [id number]                          QRS:        -17  QRSD:       98                           T:          5  QT:         416  QTc:        433    Interpretive Statements  SINUS RHYTHM  BORDERLINE LEFT AXIS DEVIATION  EARLY PRECORDIAL R/S TRANSITION  Compared to ECG 2019 04:40:17  No significant changes    Electronically Signed On 4-3-2019 23:07:05 PDT by ARIANNE DEMPSEY MD       All labs reviewed by  me.    EKG  Interpreted by me, as shown above.     RADIOLOGY  DX-CHEST-PORTABLE (1 VIEW)   Final Result         1.  Bilateral basilar atelectasis, no focal infiltrate   2.  Atherosclerosis        The radiologist's interpretation of all radiological studies have been reviewed by me.    COURSE & MEDICAL DECISION MAKING  Pertinent Labs & Imaging studies reviewed. (See chart for details)    10:57 PM - Patient seen and examined at bedside. Ordered DX-Chest, CBC with differential, CMP, Troponin, and EKG to evaluate his symptoms. The differential diagnoses include but are not limited to: unstable angina.    12:20 AM Paged Cardiology.    12:20 AM I discussed the patient's case and the above findings with Dr. Ramos (Cardiology) who wishes for the patient to be admitted for further cardiac work up and observation.      12:26 AM - Patient was reevaluated at bedside. He is resting in bed and states pain has diminished, but is beginning to come back. Discussed lab and radiology results with the patient which show a reassuring Troponin. He was made aware after speaking with Dr. Ramos, Cardiology, that the patient will need to be admitted for further cardiac workup. The patient was understanding and agreeable to admit.     12:27 AM Paged Hospitalist.     12:34 AM I discussed the patient's case and the above findings with Dr. Warren (Hospitalist) who agrees to admit the patient.    Decision Making:  Patient with recurrent episodes of angina-like chest pain he describes is similar to what is experienced in the past.  He has known severe coronary artery disease but not amenable to surgery or repair on recent catheterization.  Medical management is advised this will need to be optimized and since he has had multiple episodes of chest pain today admission is advised.  Discussed with the hospitalist.  I will go ahead and reapplies topical nitroglycerin.  Troponin initially is negative    DISPOSITION:  Patient will be admitted to   [facility], Hospitalist, in guarded condition.    FINAL IMPRESSION  1. Unstable angina (HCC)           IMarcus (Scribe), am scribing for, and in the presence of, Lv Simmons M.D..    Electronically signed by: Marcus Melvin (Scribe), 4/3/2019    Lv EDWARD M.D. personally performed the services described in this documentation, as scribed by Marcus Melvin in my presence, and it is both accurate and complete.    C.    The note accurately reflects work and decisions made by me.  Lv Simmons  4/4/2019  12:56 AM

## 2019-04-04 NOTE — ASSESSMENT & PLAN NOTE
Chronic, with unclear medication compliance due to multiple side-effects.  Will start isordil 10mg three times daily, as well as change metoprolol to 25mg long acting, as patient has been self dosing every 6 hours with metoprolol tartrate.  Will also follow closely with cardiology Dr. Abida Waterman.  Plan to discharge with this regimen, which has been sent to [facility] of choice.

## 2019-04-04 NOTE — ED NOTES
Pt resting on cart c eyes closed. rr even non labored. Remains on monitor. Vss. Family remains at bs. Call light in reach. Will ctm.

## 2019-04-04 NOTE — ED NOTES
Brought patient a pillow per family request. No other needs at this time, call light in place, family at bedside.

## 2019-04-04 NOTE — TELEPHONE ENCOUNTER
Lorena Clements R.N.   Phone Number: [phone]             LA/shorty     Pt's daughter Christina calling to report pt was in E/R again last nite for chest pressure.     Christina is asking you to have LA talk to pt directly on the phone today to question him in order to determine exactly what the patient has been experiencing.  Christina is asking you to call Brijesh, home# [phone].     Christina is questioning why, after she has reached out to LA several times, why contact with the patient has not been made especially in view of the recent multiple E/R visits.     Please call Christina on cell# [phone] if questions.      Called pt's daughter, offered appt to see Dr Abida Waterman tomorrow 4/5/19 at 0330pm, daughter agreed, scheduled pt w/ Dr Abida Waterman at 4/5/19 0330pm, office location confirmed w/ daughter. She verbalizes understanding

## 2019-04-04 NOTE — TELEPHONE ENCOUNTER
Received notification from [employer] that patient's repatha has been chipped.  His co-pay is $150    Follow-up as scheduled    Michael J Bloch, MD  Certified Clinical Lipid Specialist  Medial Director, [facility]

## 2019-04-04 NOTE — ED NOTES
"Gave bedside report to Savi BOURGEOIS, introduced oncoming nurse to patient. RN updated whiteboard. Patient asked, \"When I arrived, my blood pressure was in the 140s. After that girl took  4 vials of my blood, my pressure went down to 113. My question is, can my blood pressure drop that much after 4 vials of blood taken?\" RN educated the patient and family that his blood pressure would not drop based off of a 4 vial lab draw. Patient verbalized understanding. VS reassessed and stable. See flowsheet. Patient declines any other needs at this time, call light within reach.   "

## 2019-04-04 NOTE — ED NOTES
"Pt unhooked from monitor, ambulatory to restroom c steady gait c wife. Denies any dizziness. Lab contacted for cmp redraw. Pt & family aware they will be down shortly. Wife at bs, \"what about cardiology, its been hours?\" attempted to explain that the hospitalist has called  Dr. Ramos and will be in to see pt this even per their request. Call light inreach.   "

## 2019-04-04 NOTE — ED NOTES
Brought patient warm blanket, patient declines any other needs at this time, family at bedside, call light within reach.

## 2019-04-04 NOTE — TELEPHONE ENCOUNTER
Abida Waterman M.D.   You 20 minutes ago (9:51 AM)      I spoke to them.  Please do not put them in an office visit unless okayed by me.  I have set limits and told him I will see them on our pre-designated visits not in an urgent fashion - ok?     Can you check on the script too? (Routing comment)        Called pt's daughter, cancelled appt for tomorrow, reassurance provided again to daughter, will send more refills for the long acting Metoprolol and nitrate prescribed by hospitalist. Daughter is appreciative of call and verbalizes understanding

## 2019-04-12 ENCOUNTER — TELEPHONE (OUTPATIENT)
Dept: CARDIOLOGY | Facility: MEDICAL CENTER | Age: 74
End: 2019-04-12

## 2019-04-12 DIAGNOSIS — I25.110 CORONARY ARTERY DISEASE INVOLVING NATIVE CORONARY ARTERY OF NATIVE HEART WITH UNSTABLE ANGINA PECTORIS (HCC): Chronic | ICD-10-CM

## 2019-04-12 DIAGNOSIS — I20.9 ANGINA PECTORIS (HCC): ICD-10-CM

## 2019-04-12 RX ORDER — ISOSORBIDE DINITRATE 10 MG/1
10 TABLET ORAL 3 TIMES DAILY
Qty: 270 TAB | Refills: 3 | Status: SHIPPED | OUTPATIENT
Start: 2019-04-12 | End: 2019-04-24 | Stop reason: SDUPTHER

## 2019-04-12 RX ORDER — METOPROLOL SUCCINATE 25 MG/1
25 TABLET, EXTENDED RELEASE ORAL DAILY
Qty: 90 TAB | Refills: 3 | Status: SHIPPED | OUTPATIENT
Start: 2019-04-12 | End: 2019-04-26 | Stop reason: SDUPTHER

## 2019-04-12 NOTE — TELEPHONE ENCOUNTER
WIN Hodge/Traci       Patient's wife called for new prescriptions. I told her they were called into the pharmacy on 04/04. She called the pharmacy and was told they never received the fax for the Metoprolol and Isosorbide Dinitrate. She would like a call back at [phone].        Called [facility], they confirmed they didn't received the Rx sent last 4/4/19. Rx resend.     Called pt's wife (Patti) and notified, wife appreciative and verbalizes understanding

## 2019-04-24 ENCOUNTER — TELEPHONE (OUTPATIENT)
Dept: CARDIOLOGY | Facility: MEDICAL CENTER | Age: 74
End: 2019-04-24

## 2019-04-24 DIAGNOSIS — I20.9 ANGINA PECTORIS (HCC): ICD-10-CM

## 2019-04-24 DIAGNOSIS — I25.110 CORONARY ARTERY DISEASE INVOLVING NATIVE CORONARY ARTERY OF NATIVE HEART WITH UNSTABLE ANGINA PECTORIS (HCC): Chronic | ICD-10-CM

## 2019-04-24 RX ORDER — ISOSORBIDE DINITRATE 10 MG/1
10 TABLET ORAL 4 TIMES DAILY
Qty: 360 TAB | Refills: 3 | Status: SHIPPED | OUTPATIENT
Start: 2019-04-24 | End: 2019-04-26 | Stop reason: SDUPTHER

## 2019-04-24 NOTE — TELEPHONE ENCOUNTER
Discussed w/ Dr Thomas-ADD, per WE[facility], pt may increased one of his Isordil scheduled dose to 20mg, total of 4 doses a day.    Called pt's wife (Patti), discussed above recommendations by SW, wife verbalizes understanding     New Rx for Isordil 10mg total 4 doses daily sent to [facility] per wife's request

## 2019-04-26 RX ORDER — METOPROLOL SUCCINATE 25 MG/1
37.5 TABLET, EXTENDED RELEASE ORAL DAILY
Qty: 180 TAB | Refills: 2 | Status: SHIPPED | OUTPATIENT
Start: 2019-04-26

## 2019-04-26 RX ORDER — ISOSORBIDE DINITRATE 10 MG/1
10 TABLET ORAL 3 TIMES DAILY
Qty: 270 TAB | Refills: 3 | Status: SHIPPED | OUTPATIENT
Start: 2019-04-26 | End: 2019-05-21

## 2019-04-26 NOTE — TELEPHONE ENCOUNTER
Concerns relayed to ADD-MD.    Per ADD-MD, if Isordil is taken greater than 3 times a day, pt may experience Tachyphylaxis making the medication less effective.  Per ADD-MD, decrease dose of Isordil to 3 times daily and increase Toprol to 37.5mg daily.    Returned Patti's phone call and reviewed ADD-MD's recommendations.  Phone call placed on speaker phone for pt to listen.  Patient verbalizes understanding.  Patti states she does not understand.  Explained to patient and wife tachyphylaxis and the importance of decreasing Isordil.  She verbalizes understanding and read back recommendations to this RN. Preferred pharmacy verified - [address].  She states no other concerns or questions at this time and is appreciative of information given.    Medication ordered and sent to pt preferred pharmacy - [address]

## 2019-04-26 NOTE — TELEPHONE ENCOUNTER
"patient is feeling miserable and weak   Received: Today Message Contents   WIN Oneal       Patient's wife Patti called and said patient was feeling pressure in his neck and upper chest (but, no pain). She said it was coming in waves. She said this morning he is feeling absolutely miserable and weak bit no pressure, patient told her he feels like he was poisoned. His last Isosorbide was taken at 9:00 pm. She thinks it's caused by the side effects from the increase in his Isosorbide. She can be reached at [phone].      Returned phone call from wife, Patti, kevyn discussion noted.  She states patient was ok this morning.  She continued to describe what pt was experiencing last night.  She states, \"He has been taking Isosorbide (isordil) 4 times a day for the last couple of days.  It is hard for him to describe.  He was weak, fatigued, and felt like he was drugged.  We had a stressful day so I am not sure if that was the reason why he had neck pressure.  It was on and off.  He did not want to take his fast acting Nitro or go to the ER.  He couldn't sleep at all.  I read somewhere that this medication could actually cause that.\"      Reviewed latest BP readings.  She states patient takes his BP when he has to take his medications.  She continued to state he takes additional BP readings when he feels like his BP is high.    4/25  0500: 110/70  1100: 125/78 HR 86bpm  1500: 123/74 HR80  2100: 134/81 HR 81  2200: 127/72 HR 88    4/26  0500: 127/79 76  1100: 143/81 HR: 78 Per Patti patient did not relax prior to taking BP at this time.    Reassurance given that BP log looks good.  Encouraged Patti to give reassurance to pt of good BP log.  She states, \"He has no complaints today.  He took his Isosorbide at 5am and his next dose is at 1pm.  He just took his Metoprolol and now he is saying he is starting to having pressure in his neck about a 1-2/10 on the pain scale.\"     Reassurance given " that concerns will be relayed to ADD-MD for further advise.  Patti continued to repeat findings and that she and her  are nervous.  ED/Urgent care precautions noted if symptoms worsen.  She verbalizes understanding and is appreciative of information given.    Concerns relayed to ADD-MD for advise.

## 2019-05-06 ENCOUNTER — TELEPHONE (OUTPATIENT)
Dept: CARDIOLOGY | Facility: MEDICAL CENTER | Age: 74
End: 2019-05-06

## 2019-05-06 NOTE — TELEPHONE ENCOUNTER
Flor Delvalle, Med Ass't  Traci Clements R.N.   Phone Number: [phone]             [place]     Pt's daughter Christina would like to discuss the side effects of his meds.   # [phone]      Called pt, pt reports his BP is good and he is not having anymore chest pressure/pain after increasing Toprol to 37.5mg PO daily and decreasing Isordil back to 10mg TID (See previous notes) but now he feels like he is having side effects from this medications, pt reports he had some headache, dizziness, fatigue and numbness on face, pt reports symptoms is more pronounced after taking his Toprol dose, denies other neurological symptoms like focal weakness or facial droop, he thinks the symptoms is related to medications, he wanted to know if there is anything we could do to help lessen side effects. Informed pt will discussed concerns w/ Dr Waterman, pt verbalizes understanding     To Dr Waterman

## 2019-05-07 NOTE — TELEPHONE ENCOUNTER
Got it.    The meds are suppressing his sxs -- if they want to titrate down, do slowly, and watch for return of sxs    Hope that helps!

## 2019-05-07 NOTE — TELEPHONE ENCOUNTER
TC to pt. S/w wife & pt.   Told them exactly what Dr. Waterman replied.   If wife decides to titrate down, I suggested doing just one med at a time.   She agreed. Will keep 5/21 FV w/ Dr. Waterman

## 2019-05-21 ENCOUNTER — OFFICE VISIT (OUTPATIENT)
Dept: CARDIOLOGY | Facility: MEDICAL CENTER | Age: 74
End: 2019-05-21
Payer: MEDICARE

## 2019-05-21 VITALS
OXYGEN SATURATION: 94 % | HEIGHT: 69 IN | HEART RATE: 88 BPM | SYSTOLIC BLOOD PRESSURE: 134 MMHG | WEIGHT: 208 LBS | BODY MASS INDEX: 30.81 KG/M2 | DIASTOLIC BLOOD PRESSURE: 74 MMHG

## 2019-05-21 DIAGNOSIS — Z78.9 STATIN INTOLERANCE: Chronic | ICD-10-CM

## 2019-05-21 DIAGNOSIS — I10 ESSENTIAL HYPERTENSION: Primary | ICD-10-CM

## 2019-05-21 DIAGNOSIS — I25.110 CORONARY ARTERY DISEASE INVOLVING NATIVE CORONARY ARTERY OF NATIVE HEART WITH UNSTABLE ANGINA PECTORIS (HCC): Chronic | ICD-10-CM

## 2019-05-21 DIAGNOSIS — E78.2 MIXED HYPERLIPIDEMIA: Chronic | ICD-10-CM

## 2019-05-21 DIAGNOSIS — Z95.5 H/O RIGHT CORONARY ARTERY STENT PLACEMENT: Chronic | ICD-10-CM

## 2019-05-21 DIAGNOSIS — I20.9 ANGINA PECTORIS (HCC): ICD-10-CM

## 2019-05-21 DIAGNOSIS — I73.9 PERIPHERAL ARTERIAL DISEASE (HCC): Chronic | ICD-10-CM

## 2019-05-21 PROBLEM — D64.9 NORMOCYTIC ANEMIA: Status: RESOLVED | Noted: 2019-04-04 | Resolved: 2019-05-21

## 2019-05-21 PROCEDURE — 99214 OFFICE O/P EST MOD 30 MIN: CPT | Performed by: INTERNAL MEDICINE

## 2019-05-21 RX ORDER — ISOSORBIDE DINITRATE 5 MG/1
5 TABLET ORAL 3 TIMES DAILY
Qty: 90 TAB | Refills: 3 | Status: SHIPPED | OUTPATIENT
Start: 2019-05-21 | End: 2019-05-23 | Stop reason: SDUPTHER

## 2019-05-21 ASSESSMENT — ENCOUNTER SYMPTOMS
BLURRED VISION: 0
HEMOPTYSIS: 0
EYE DISCHARGE: 0
EYE PAIN: 0
FEVER: 0
LOSS OF CONSCIOUSNESS: 0
MYALGIAS: 0
BRUISES/BLEEDS EASILY: 0
PALPITATIONS: 0
COUGH: 0
DEPRESSION: 0
HEARTBURN: 0
NERVOUS/ANXIOUS: 0
VOMITING: 0
NAUSEA: 0
WHEEZING: 0
DIZZINESS: 0
CHILLS: 0

## 2019-05-21 NOTE — PROGRESS NOTES
Chief Complaint   Patient presents with   • Coronary Artery Disease     follow up       Subjective:   Brijesh Brown is a 74 y.o. male who presents today In follow-upIn regards to his obliterative coronary disease, MI with LAD stenting 2004, intolerant of statins for many many years  Diffuse disease noted on angiogram in February of this year, high-grade mid LAD distal LAD ostial circumflex proximal circumflex diffuse RCA with patent stents to LAD and RCA.  Not a surgical candidate or for PCI    Chest pain seemed out of proportion to amount of ischemia on PET scan in February which is moderate in the inferior wall.  RCA is not amenable to stenting    Again in with his wife.  Hopes to decrease his medicines.  Intentional weight loss but feels terrible.  No chest pain -relates all of his ailments including dizziness tiredness fatigue to metoprolol and isosorbide    Past Medical History:   Diagnosis Date   • Angina at rest ([facility])    • Benign essential HTN 4/23/2012   • CAD (coronary artery disease)     S/P NIKKI X 3 in '04   • CVA, old, disturbances of vision     right eye   • Depression 4/23/2012   • Diabetes ([facility])    • Myalgia    • Other and unspecified hyperlipidemia 12/20/2013   • Overweight(278.02) 4/23/2012   • PVD (peripheral vascular disease) ([facility]) 4/23/2012     Past Surgical History:   Procedure Laterality Date   • INCISION HERNIA REPAIR     • ZZZ CARDIAC CATH       Family History   Problem Relation Age of Onset   • Lung Disease Mother    • Heart Disease Father         CABG x3, then re-do CABG x 4   • Diabetes Sister    • Heart Disease Sister      Social History     Social History   • Marital status:      Spouse name: N/A   • Number of children: N/A   • Years of education: N/A     Occupational History   • Not on file.     Social History Main Topics   • Smoking status: Former Smoker     Packs/day: 1.00     Years: 20.00     Types: Cigarettes     Quit date: 1/1/1984   • Smokeless tobacco: Never Used       Comment: quit '84, 20 py history   • Alcohol use No   • Drug use: No   • Sexual activity: Not on file      Comment:  48 years     Other Topics Concern   • Not on file     Social History Narrative   • No narrative on file     Allergies   Allergen Reactions   • Statins [Hmg-Coa-R Inhibitors]      Caused muscle pain     Outpatient Encounter Prescriptions as of 5/21/2019   Medication Sig Dispense Refill   • isosorbide dinitrate (ISORDIL) 10 MG Tab Take 1 Tab by mouth 3 times a day. 270 Tab 3   • metoprolol SR (TOPROL XL) 25 MG TABLET SR 24 HR Take 1.5 Tabs by mouth every day. 180 Tab 2   • Evolocumab (REPATHA) 140 MG/ML Solution Prefilled Syringe Inject 140 mg as instructed every 14 days for 360 days. 6 Syringe 3   • metFORMIN (GLUCOPHAGE) 500 MG Tab Take 500 mg by mouth 2 times a day, with meals.     • zolpidem (AMBIEN) 5 MG Tab Take 5 mg by mouth at bedtime as needed for Sleep.     • aspirin EC (ECOTRIN) 81 MG Tablet Delayed Response Take 1 Tab by mouth every day. 30 Tab 11   • Insulin Degludec (TRESIBA FLEXTOUCH) 200 UNIT/ML Solution Pen-injector Inject 30 Units as instructed every evening.     • [DISCONTINUED] amLODIPine (NORVASC) 2.5 MG Tab Take 1 Tab by mouth every day. (Patient not taking: Reported on 5/21/2019) 90 Tab 3   • nitroglycerin (NITROSTAT) 0.4 MG SL Tab Place 1 Tab under tongue as needed for Chest Pain. 25 Tab 0     No facility-administered encounter medications on file as of 5/21/2019.      Review of Systems   Constitutional: Negative for chills and fever.   HENT: Negative for congestion and hearing loss.    Eyes: Negative for blurred vision, pain and discharge.   Respiratory: Negative for cough, hemoptysis and wheezing.    Cardiovascular: Negative for chest pain, palpitations and leg swelling.   Gastrointestinal: Negative for heartburn, nausea and vomiting.   Genitourinary: Negative for dysuria and urgency.   Musculoskeletal: Negative for joint pain and myalgias.   Skin: Negative for itching  "and rash.   Neurological: Negative for dizziness and loss of consciousness.   Endo/Heme/Allergies: Negative for environmental allergies. Does not bruise/bleed easily.   Psychiatric/Behavioral: Negative for depression. The patient is not nervous/anxious.    All other systems reviewed and are negative.       Objective:   /74 (BP Location: Left arm, Patient Position: Sitting)   Pulse 88   Ht 1.74 m (5' 8.5\")   Wt 94.3 kg (208 lb)   SpO2 94%   BMI 31.17 kg/m²      Physical Exam   Constitutional: He is oriented to person, place, and time. He appears well-developed and well-nourished.   HENT:   Head: Normocephalic and atraumatic.   Eyes: Pupils are equal, round, and reactive to light. EOM are normal.   Neck: No JVD present. No thyromegaly present.   Cardiovascular: Normal rate, regular rhythm and intact distal pulses.    No murmur heard.  Pulmonary/Chest: Breath sounds normal. No respiratory distress.   Abdominal: Bowel sounds are normal. He exhibits no distension.   Musculoskeletal: He exhibits no edema or tenderness.   Neurological: He is alert and oriented to person, place, and time.   Skin: Skin is warm and dry.   Psychiatric: He has a normal mood and affect. His behavior is normal.       Assessment:     1. Coronary artery disease involving native coronary artery of native heart with unstable angina pectoris (HCC)     2. Statin intolerance     3. Angina pectoris (HCC)     4. Peripheral arterial disease (HCC)     5. H/O right coronary artery stent placement     6. Mixed hyperlipidemia         Medical Decision Making:  Today's Assessment / Status / Plan:       Coronary disease  I looked at images of his angiogram from February  I looked at images of his PET scan with a normal ejection fraction of 64%    We spent 15 minutes discussing medications.  I feel strongly he should take these as he is been to the emergency room now twice in the last months for chest pains.  I am impressed by his weight loss and I gave " him congratulations  I will call in 5 mg of isosorbide and he will take 7.53 times daily and see how he feels.  Continue metoprolol    Continue aspirin  Multiple statin intolerances follows with a lipid clinic on Repatha    Managing expectations/angina  Talked again about physiology.  I understand that he feels poorly and would not like to live if he is taking these medicines and feeling this bad  I counterbalanced this with concerned about his rate limiting angina that is not amenable to intervention.  Offered them an opinion with my partners or even at [facility].  They state they do not have the money for [facility]    RT 4 months sooner with concerns

## 2019-05-22 DIAGNOSIS — I10 ESSENTIAL HYPERTENSION: Primary | ICD-10-CM

## 2019-05-23 RX ORDER — ISOSORBIDE DINITRATE 5 MG/1
5 TABLET ORAL 3 TIMES DAILY
Qty: 90 TAB | Refills: 3 | Status: SHIPPED | OUTPATIENT
Start: 2019-05-23

## 2019-05-23 RX ORDER — ISOSORBIDE DINITRATE 5 MG/1
TABLET ORAL
Qty: 270 TAB | Refills: 1 | Status: SHIPPED | OUTPATIENT
Start: 2019-05-23

## 2019-05-24 ENCOUNTER — OFFICE VISIT (OUTPATIENT)
Dept: VASCULAR LAB | Facility: MEDICAL CENTER | Age: 74
End: 2019-05-24
Attending: INTERNAL MEDICINE
Payer: MEDICARE

## 2019-05-24 VITALS
HEIGHT: 69 IN | WEIGHT: 208.8 LBS | HEART RATE: 82 BPM | DIASTOLIC BLOOD PRESSURE: 61 MMHG | SYSTOLIC BLOOD PRESSURE: 122 MMHG | BODY MASS INDEX: 30.93 KG/M2

## 2019-05-24 DIAGNOSIS — E11.8 TYPE 2 DIABETES MELLITUS WITH COMPLICATION, WITH LONG-TERM CURRENT USE OF INSULIN (HCC): Chronic | ICD-10-CM

## 2019-05-24 DIAGNOSIS — E78.2 MIXED HYPERLIPIDEMIA: Chronic | ICD-10-CM

## 2019-05-24 DIAGNOSIS — I10 BENIGN ESSENTIAL HTN: Chronic | ICD-10-CM

## 2019-05-24 DIAGNOSIS — Z79.4 TYPE 2 DIABETES MELLITUS WITH COMPLICATION, WITH LONG-TERM CURRENT USE OF INSULIN (HCC): Chronic | ICD-10-CM

## 2019-05-24 PROCEDURE — 99212 OFFICE O/P EST SF 10 MIN: CPT | Performed by: NURSE PRACTITIONER

## 2019-05-24 PROCEDURE — 99214 OFFICE O/P EST MOD 30 MIN: CPT | Performed by: NURSE PRACTITIONER

## 2019-05-24 ASSESSMENT — ENCOUNTER SYMPTOMS
DOUBLE VISION: 0
TREMORS: 0
NAUSEA: 0
DIZZINESS: 0
BRUISES/BLEEDS EASILY: 0
FOCAL WEAKNESS: 0
DIARRHEA: 0
BLOOD IN STOOL: 0
ORTHOPNEA: 0
SHORTNESS OF BREATH: 0
MYALGIAS: 0
SORE THROAT: 0
HEMOPTYSIS: 0
PALPITATIONS: 0
ABDOMINAL PAIN: 0
WHEEZING: 0
COUGH: 0
WEAKNESS: 0
BLURRED VISION: 0
DEPRESSION: 0
NERVOUS/ANXIOUS: 0
HEADACHES: 0
SEIZURES: 0
INSOMNIA: 0
VOMITING: 0

## 2019-05-24 NOTE — PROGRESS NOTES
Follow Up VASCULAR MED VISIT  Subjective:   Brijesh Brown is a 74 y.o. male who presents today 2019 for   Chief Complaint   Patient presents with   • Follow-Up   Referred by cardiology for eval and mgmt of HLD in context of statin intolerance and complex CAD, T2D, HTN.     HPI:    CAD:   Hx of complex inoperable multi-vessel CAD.  Had cath last month and no interventions were provided due to complexity.  Had f/u with cardiology and recommend for PCSK9i due to statin intolerance and need for aggressive risk factor reduction.   No CP, SOB, palpitations,   No LE swelling    Hyperlipidemia:  Stable, no current concerns  Current treatment: Repatha, started 19 and using every 2 weeks   Prior statin: had flu-like sx, myalgias on atorvastatin, rosuvastatin, lovastatin, pravastatin - all intolerant.  Reports trial of 6 different statins and failed.  Had previously used Repatha with good response and met goals.    Baseline cholesterol (19):  , , HDL 42, .  Highest LDL in Epic since  was 185.    Myalgias? No  Other adverse drug reactions? No  Lipid profile:   Baseline (2014) , , HDL 44, TG 95  LDL   Date Value   2019 179 mg/dL (H)   2018 160 mg/dL (H)   2015 182 mg/dL (H)   10/01/2013 170     HDL   Date Value   2019 42 mg/dL   2018 38 mg/dL (L)   2015 42 mg/dL   10/01/2013 44     HTN:  Current HTN concerns:  Decreased energy with metoprolol.   Current ADRs: Yes as noted   HTN sx:  No current blurred or changed vision, chest pain, shortness of breath, headache, nausea, dizziness/vertigo   Home BP lo-120's/60-70's  24h ABPM completed: not tested  Adherence to current HTN meds: compliant all of the time     Antiplatelet/anticoagulation: Yes, Details: ASA 81mg w/o bleeding/bruising     PAD: able to walk 5-10min and mild calf pain, resolved within 20s at rest. No rest pain or limb cyanosis.      T2D: Using metformin and tresiba.   Fasting AM sugars = 105-117.  Not using metformin.  No changes in dietary habits.      Pertinent HTN hx (reviewed at initial visit):   Age at HTN dx: >5yrs   Past HTN medications: as noted only   HTN crises:  No prior hospitalization or crises   Lifestyle factors:     High salt: No   EtOH: No  Interfering substances:     NSAIDs: No    Decongestants. No   Stimulants/drugs: No    Clinical evidence of ASCVD:     1) hx of MI/ACS:  No   2) coronary or other revascularization procedure: Yes, Details: recently last month    3) TIA/ischemic CVA: No   4) PAD (including KWADWO <0.9): Yes, Details: symptomatic BLE    5) documented (CAD, Renal athero, AA due to athero, carotid plaque >50% stenosis: No    Major RFs:     1) Age (Men>44, women>54):  yes   2) Fhx early CAD (<55 men, <65 women):  no   3) cigarette smoking:  No   4) high BP >139/89 or on tx: yes   5) Low HDL-C (<40 men, <50 women):  no    Other ASCVD risk factors:     CKD:  No   Sleeping disorder/CHERIE: No   Hypothyroidism: No    History   Smoking Status   • Former Smoker   • Packs/day: 1.00   • Years: 20.00   • Types: Cigarettes   • Quit date: 1/1/1984   Smokeless Tobacco   • Never Used     Comment: quit '84, 20 py history     Social History   Substance Use Topics   • Smoking status: Former Smoker     Packs/day: 1.00     Years: 20.00     Types: Cigarettes     Quit date: 1/1/1984   • Smokeless tobacco: Never Used      Comment: quit '84, 20 py history   • Alcohol use No     Outpatient Encounter Prescriptions as of 5/24/2019   Medication Sig Dispense Refill   • isosorbide dinitrate (ISORDIL) 5 MG Tab Take 1 Tab by mouth 3 times a day. 90 Tab 3   • isosorbide dinitrate (ISORDIL) 5 MG Tab TAKE 1 TABLET BY MOUTH THREE TIMES DAILY 270 Tab 1   • metoprolol SR (TOPROL XL) 25 MG TABLET SR 24 HR Take 1.5 Tabs by mouth every day. 180 Tab 2   • Evolocumab (REPATHA) 140 MG/ML Solution Prefilled Syringe Inject 140 mg as instructed every 14 days for 360 days. 6 Syringe 3   •  nitroglycerin (NITROSTAT) 0.4 MG SL Tab Place 1 Tab under tongue as needed for Chest Pain. 25 Tab 0   • metFORMIN (GLUCOPHAGE) 500 MG Tab Take 500 mg by mouth 2 times a day, with meals.     • zolpidem (AMBIEN) 5 MG Tab Take 5 mg by mouth at bedtime as needed for Sleep.     • aspirin EC (ECOTRIN) 81 MG Tablet Delayed Response Take 1 Tab by mouth every day. 30 Tab 11   • Insulin Degludec (TRESIBA FLEXTOUCH) 200 UNIT/ML Solution Pen-injector Inject 30 Units as instructed every evening.       No facility-administered encounter medications on file as of 5/24/2019.      Allergies   Allergen Reactions   • Statins [Hmg-Coa-R Inhibitors]      Caused muscle pain     DIET AND EXERCISE:  Weight Change:   BMI Readings from Last 4 Encounters:   05/24/19 31.28 kg/m²   05/21/19 31.17 kg/m²   04/03/19 33.71 kg/m²   03/21/19 34.52 kg/m²      Diet: low carbohydrate  Exercise: no regular exercise program     Review of Systems   Constitutional: Negative for malaise/fatigue.   HENT: Negative for nosebleeds, sore throat and tinnitus.    Eyes: Negative for blurred vision and double vision.   Respiratory: Negative for cough, hemoptysis, shortness of breath and wheezing.    Cardiovascular: Negative for chest pain, palpitations, orthopnea and leg swelling.   Gastrointestinal: Negative for abdominal pain, blood in stool, diarrhea, melena, nausea and vomiting.   Genitourinary: Negative for hematuria.   Musculoskeletal: Negative for joint pain and myalgias.   Skin: Negative for itching and rash.   Neurological: Negative for dizziness, tremors, focal weakness, seizures, weakness and headaches.   Endo/Heme/Allergies: Does not bruise/bleed easily.   Psychiatric/Behavioral: Negative for depression. The patient is not nervous/anxious and does not have insomnia.       Objective:     Vitals:    05/24/19 1027   BP: 122/61   BP Location: Left arm   Patient Position: Sitting   BP Cuff Size: Adult   Pulse: 82   Weight: 94.7 kg (208 lb 12.8 oz)   Height:  "1.74 m (5' 8.5\")      BP Readings from Last 4 Encounters:   05/24/19 122/61   05/21/19 134/74   04/03/19 124/71   03/21/19 126/75      Body mass index is 31.28 kg/m².  Physical Exam   Constitutional: He is oriented to person, place, and time. He appears well-developed and well-nourished. He is cooperative. No distress.   HENT:   Mouth/Throat: Mucous membranes are normal.   Neck: Trachea normal. Normal carotid pulses present. Carotid bruit is not present.   Cardiovascular: Normal rate, regular rhythm, normal heart sounds and intact distal pulses.  Exam reveals no gallop and no friction rub.    No murmur heard.  Pulses:       Carotid pulses are 2+ on the right side, and 2+ on the left side.       Radial pulses are 2+ on the right side, and 2+ on the left side.        Dorsalis pedis pulses are 2+ on the right side, and 2+ on the left side.        Posterior tibial pulses are 2+ on the right side, and 2+ on the left side.   Edema:    RLE: none     LLE: none   Spider telangectasia:       RLE:  None      LLE: none   Varicosities:         RLE: none      LLE: none   Corona phlebectatica:      RLE:  None        LLE:  None   Cording:         RLE:  None     LLE: None    Pulmonary/Chest: Effort normal and breath sounds normal. No respiratory distress.   Abdominal: There is no hepatosplenomegaly.   Musculoskeletal: He exhibits no edema.   Neurological: He is alert and oriented to person, place, and time. Coordination and gait normal.   Skin: Skin is warm and dry. He is not diaphoretic. No cyanosis. No pallor. Nails show no clubbing.   Psychiatric: He has a normal mood and affect.     Lab Results   Component Value Date    CHOLSTRLTOT 125 03/22/2019    CHOLSTRLTOT 245 (H) 02/08/2019    LDL 66 03/22/2019     (H) 02/08/2019    HDL 44 03/22/2019    HDL 42 02/08/2019    TRIGLYCERIDE 76 03/22/2019    TRIGLYCERIDE 121 02/08/2019      Lab Results   Component Value Date    PROTHROMBTM 12.9 02/08/2019    INR 0.96 02/08/2019       Lab " Results   Component Value Date    HBA1C 9.0 (H) 02/08/2019      Lab Results   Component Value Date    SODIUM 133 (L) 04/04/2019    POTASSIUM 3.6 04/04/2019    CHLORIDE 105 04/04/2019    CO2 19 (L) 04/04/2019    GLUCOSE 147 (H) 04/04/2019    BUN 13 04/04/2019    CREATININE 0.90 04/04/2019    BUNCREATRAT 13 03/22/2019    IFAFRICA >60 04/04/2019    IFNOTAFR >60 04/04/2019        Lab Results   Component Value Date    WBC 8.3 04/03/2019    RBC 4.02 (L) 04/03/2019    HEMOGLOBIN 13.9 (L) 04/03/2019    HEMATOCRIT 39.8 (L) 04/03/2019    MCV 99.0 (H) 04/03/2019    MCH 34.6 (H) 04/03/2019    MCHC 34.9 04/03/2019    MPV 11.1 04/03/2019      VASCULAR IMAGING:     Echo 2/9/19  Compared to the images of the study done 2/3/16 - there has been.   Normal left ventricular wall thickness.  Normal left ventricular systolic function.  Left ventricular ejection fraction is visually estimated to be 55%.  Mildly dilated right ventricle.  Reduced right ventricular systolic function.  Normal left atrial size.  Trace mitral regurgitation.  Structurally normal aortic valve without significant stenosis or   regurgitation.  Unable to estimate pulmonary artery pressure due to an inadequate   tricuspid regurgitant jet.  Normal pericardium without effusion.    Bilat art duplex 3/12/19  Bilateral.  Moderate arterial insufficiency.     Medical Decision Making:  Today's Assessment / Status / Plan:     1. Mixed hyperlipidemia     2. Benign essential HTN     3. Type 2 diabetes mellitus with complication, with long-term current use of insulin (HCC)       Patient Type: Secondary Prevention and DM    Etiology of Established CVD if Present:   1) CAD, s/p PCI and stenting     Lipid Management: Qualifies for Statin Therapy Based on 2013 ACC/AHA Guidelines: yes  Calculated 10-Year Risk of ASCVD: N/A  Currently on Statin: No, has multiple failures due to intolerance   Has tolerated Repatha.  Clearly meets  Criteria for use based upon secondary prevention of  complex CAD and severely elevated LDL cholesterol along with multiple other risk factors including age, HTN, T2D.    NLA Risk Category:   Very high:  Evidence of ASCVD   Tx threshold:  non-HDL-C >99, LDL-C >69  Tx goal: non-HDL-C <100,  LDL-C <70  (optional: apoB <80)  At goal:  no  Tx plan:   - MA will assist with coordination of Repatha with specialty pharm and insurance   - Continue Repatha 140mg Q2wk - new Rx sent to [facility]  - Check labs prior to next appt   - Continue TLC measures   - Consider addition of Zetia if needed     Blood Pressure Management:Goal: ACC/AHA (2017) goal <130/80  Home BP at goal:  Unsure   Office BP at goal:  yes  Plan:   - Continue home BP monitoring, reviewed correct technique:  Yes   - Order 24h ABPM:  NO  - Monitor lytes/gfr routinely   - Continue metoprolol 25mg BID  - Consider addition of ACEi or ARB if BP uncontrolled     Glycemic Status: Diabetic, T2  Last A1c = 9.0% , 2/2019  Goal A1c = <7.5% reasonable based upon co-morbidities, ideal <7.0%   ACR not available   - Recommmend for routine care with PCP (or endocrine) to include regular A1c monitoring, annual albumin/creatinine ratio (ACR), annual diabetic retinopathy screening, foot exams, annual flu vaccine, and updates to pneumonia vaccines as appropriate   - Defer mgmt to PCP     Anti-Platelet/Anti-Coagulant Tx: yes  - Continue ASA 81mg daily     Smoking: continued complete avoidance of all tobacco products     Physical Activity: continue healthy activity to improve CV fitness, see care instructions     Weight Management and Nutrition: Dietary plan was discussed with patient at this visit including DASH, low sodium as outlined in care instructions     Other:   1) PAD, moderate per arterial duplex with claudication, no signs of limb ischemia or rest pain   - Monitor closely   - Continue aggressive med management   - Check duplex and refer for intervention if developing signs of limb ischemia    2) CHERIE - Continue CPAP, defer  mgmt to PCP and pulm    3) Vit D deficiency - Continue vit D3 4000 units daily     Instructed to follow-up with PCP for remainder of adult medical needs: yes  We will partner with other providers in the management of established vascular disease and cardiometabolic risk factors.    Studies to Be Obtained: BLE art duplex in 2-3yrs or if sx development   Labs to Be Obtained: as above prior to next visit      Follow up in: 3 months    MADINA HameedP.N.

## 2019-05-29 ENCOUNTER — TELEPHONE (OUTPATIENT)
Dept: CARDIOLOGY | Facility: MEDICAL CENTER | Age: 74
End: 2019-05-29

## 2019-05-29 NOTE — TELEPHONE ENCOUNTER
----- Message from Mimi Chun sent at 5/29/2019  9:35 AM PDT -----  Contact: [phone]  [place]/Tiffanie Kitchen's wife, Patti would please like a call back soon at [phone].  She wants to know if his heart medication would interact with Flomax and Vicodin for Kidney stone pain.

## 2019-06-04 ENCOUNTER — TELEPHONE (OUTPATIENT)
Dept: VASCULAR LAB | Facility: MEDICAL CENTER | Age: 74
End: 2019-06-04

## 2019-06-04 NOTE — TELEPHONE ENCOUNTER
[facility]     Left VM asking pt to report if he is receiving shipments of Repatha.      Yoshi Strange, PharmD

## 2019-06-13 ENCOUNTER — TELEPHONE (OUTPATIENT)
Dept: CARDIOLOGY | Facility: MEDICAL CENTER | Age: 74
End: 2019-06-13

## 2019-06-13 NOTE — TELEPHONE ENCOUNTER
WIN Hodge/Traci       Patient's wife said her  is constipated. She wants to make sure it is okay for him to have an enema. She can be reached at [phone].        Discussed w/ Dr Waterman, per Dr Waterman, no contraindication for Enema.     Called pt's wife (Patti) back and notified, she verbalizes understanding

## 2019-06-24 ENCOUNTER — TELEPHONE (OUTPATIENT)
Dept: VASCULAR LAB | Facility: MEDICAL CENTER | Age: 74
End: 2019-06-24

## 2019-06-24 NOTE — TELEPHONE ENCOUNTER
Received PA request from JeseniaGolimi for Repatha. Called [facility] to do PA however insurance formulary has changed o Praluent this year. Got a PA Approval for Prlauent 75 mg approved by Rosmery MOLINA. Approval good from 3/26/19 - 6/23/2020.   Called patient to let him know about the change, patient states that he has tried Praluent in the past and had an allergic reaction to it. I do not see documentation In our clinical notes about this reaction. Spoke with Dr. Bloch about the patient, patient to come in for an appt to see clinical pharmacist to review and go over ALL previously tried and failed medications.

## 2019-06-24 NOTE — TELEPHONE ENCOUNTER
Case discussed with medical assistant  Patient is tolerating Repatha well in the past.  The preferred agent on his formulary has changed to Praluent.  We obtained prior authorization for Praluent, however, patient states that he has a previous intolerance to that medication    Will ask patient to come in for pharmacotherapy visit to come up with a plan for moving forward    Michael J Bloch, MD  Certified Clinical Lipid Specialist  Medial Director, [facility]

## 2019-07-01 ENCOUNTER — ANTICOAGULATION VISIT (OUTPATIENT)
Dept: VASCULAR LAB | Facility: MEDICAL CENTER | Age: 74
End: 2019-07-01
Attending: INTERNAL MEDICINE
Payer: MEDICARE

## 2019-07-01 VITALS — HEART RATE: 82 BPM | SYSTOLIC BLOOD PRESSURE: 120 MMHG | DIASTOLIC BLOOD PRESSURE: 58 MMHG

## 2019-07-01 DIAGNOSIS — I25.10 CORONARY ARTERY DISEASE INVOLVING NATIVE CORONARY ARTERY OF NATIVE HEART WITHOUT ANGINA PECTORIS: Chronic | ICD-10-CM

## 2019-07-01 PROCEDURE — 99212 OFFICE O/P EST SF 10 MIN: CPT

## 2019-07-01 NOTE — PROGRESS NOTES
[facility] - FollowUp Visit  Date of Service: 07/01/19    Brijesh Brown is here for follow up of dyslipidemia and formulary issues with insurance.     HPI  Pertinent Interval History since last visit:     Pt's formulary has changed from Repatha to Praluent.  Pt has stated he hasn't tolerated Praluent in the past, therefore pt was brought in to clinic today to discuss severity of his symptoms and future options.   Current Prescription Lipid Lowering Medications - including dose:   Statin: None  Non-Statin: Repatha 140 every 2 weeks.   Current Lipid Lowering and Related Supplements:   None  Any Current Side Effects Potentially Related to Lipid Lowering therapy?   No  Current Adherence to Lipid Lowering Therapies:  Complete  Any Previous History of Statin Intolerance?   Atorvastatin, rosuvastatin, lovastatin, pravastatin   Pt experienced myalgia and flu-like sx.   Baseline Lipids Prior to Treatment:  (2/8/19):  , , HDL 42, .  Highest LDL in Epic since 2013 was 185.      SOCIAL HISTORY  History   Smoking Status   • Former Smoker   • Packs/day: 1.00   • Years: 20.00   • Types: Cigarettes   • Quit date: 1/1/1984   Smokeless Tobacco   • Never Used     Comment: quit '84, 20 py history      Change in weight: Stable  Exercise habits: no regular exercise program   Diet: common adult    ROS  Physical Exam    DATA REVIEW  Most Recent Lipid Panel:   Lab Results   Component Value Date    CHOLSTRLTOT 125 03/22/2019    CHOLSTRLTOT 245 (H) 02/08/2019    TRIGLYCERIDE 76 03/22/2019    TRIGLYCERIDE 121 02/08/2019    HDL 44 03/22/2019    HDL 42 02/08/2019    LDL 66 03/22/2019     (H) 02/08/2019       Other Pertinent Blood Work:   Lab Results   Component Value Date    SODIUM 133 (L) 04/04/2019    POTASSIUM 3.6 04/04/2019    CHLORIDE 105 04/04/2019    CO2 19 (L) 04/04/2019    ANION 9.0 04/04/2019    GLUCOSE 147 (H) 04/04/2019    BUN 13 04/04/2019    CREATININE 0.90 04/04/2019    CALCIUM 8.9  04/04/2019    ASTSGOT 17 04/04/2019    ALTSGPT 24 04/04/2019    ALKPHOSPHAT 72 04/04/2019    TBILIRUBIN 0.9 04/04/2019    ALBUMIN 3.8 04/04/2019    AGRATIO 1.5 04/04/2019    CREACTPROT 0.30 03/18/2019       Other:  NA    Recent Imaging Studies:    None since last visit    ASSESSMENT AND PLAN  Patient Type, check all that apply:   Secondary Prevention  Established Atherosclerotic Cardiovascular Disease (ASCVD)  Yes, Details: Coronary artery stent placement (2004)  Other Established (non-atherosclerotic) Vascular Disease, if Present:    PAD  Evidence of Heterozygous Familial Hypercholesterolemia (FH): Possible (If yes, how was diagnosis made)  ACC/AHA Indication for Statin Therapy, michele all that apply:   Established ASCVD: Indication for High intensity statin    Calculated Risk for ASCVD, if applicable:  N/A  Other Significant Risk Markers, if any, michele all that apply:  None  National Lipid Association (NLA) Goal (if applicable):  LDL-C:   <70 mg/dL  Lifestyle Recommendations From Today’s Visit:   Pt not interested at this time.   Statin Recommendations from Today's Visit  none  Non-Statin Medications Recommendations from Today’s Visit:   Pt to continue therapy with PCSK9 inhibitor.    Reports when he used Praluent he had flu-like symptoms.  Past notes report he has SOB and CP, however pt denies this ever happened.      Discussed the seriousness of his interaction and if he would be willing to retry Praluent (try at least one or two of our samples), but pt refuses.  Explained that the flu-like symptoms can be diminished by taking the medication right before bed, again pt refuses.      Will work with pt's insurance and attempt to help pt qualify for assistance program if insurance will allow formulary exemption.     To begin the formulary exemption, will send a new prescription to [facility].  Pt requests any updates to the status of the approval if the clinic receives new information.    Indication for PCSK9 Inhibitor,  if applicable:  ASCVD with suboptimal control of LDL-C despite maximally tolerated statin  Supplements Recommended at this visit:  None  Recommendations for Other Cardiovascular Risk Factors, michele all that apply:   Diabetes/Impaired Fasting Glucose- Continue with improving control.   Other Issues:  none    Studies Ordered at Todays Visit:  None   Blood Work Ordered At Today’s visit:   None  Follow-Up:   With vascular in 8 weeks.     Yoshi Strange, PharmD    CC:  Amanda R Magrini, M.D. Carlsen, Stephanie APRN Michael Bloch, MD

## 2019-07-02 ENCOUNTER — TELEPHONE (OUTPATIENT)
Dept: VASCULAR LAB | Facility: MEDICAL CENTER | Age: 74
End: 2019-07-02

## 2019-07-02 NOTE — TELEPHONE ENCOUNTER
Repatha PA done via phone, the pa needs to be reviewed by a clinical pharmacist at [facility]. Case ID is [id number]. They will call with additional questioning within 72 hours.

## 2019-07-08 ENCOUNTER — TELEPHONE (OUTPATIENT)
Dept: VASCULAR LAB | Facility: MEDICAL CENTER | Age: 74
End: 2019-07-08

## 2019-07-16 ENCOUNTER — TELEPHONE (OUTPATIENT)
Dept: VASCULAR LAB | Facility: MEDICAL CENTER | Age: 74
End: 2019-07-16

## 2019-07-16 NOTE — TELEPHONE ENCOUNTER
[facility]    Left VM with pt to follow up with status of Repatha.  Pt was approved, therefore following up to make sure he receives the medication.    Yoshi Strange, PharmD

## 2019-08-06 ENCOUNTER — TELEPHONE (OUTPATIENT)
Dept: VASCULAR LAB | Facility: MEDICAL CENTER | Age: 74
End: 2019-08-06

## 2019-08-06 NOTE — TELEPHONE ENCOUNTER
[facility]    Pt's SO reports pt is receiving Repatha shipments and tolerating medication without complaint.    Yoshi Strange, PharmD

## 2019-08-30 ENCOUNTER — OFFICE VISIT (OUTPATIENT)
Dept: VASCULAR LAB | Facility: MEDICAL CENTER | Age: 74
End: 2019-08-30
Attending: INTERNAL MEDICINE
Payer: MEDICARE

## 2019-08-30 VITALS
SYSTOLIC BLOOD PRESSURE: 105 MMHG | BODY MASS INDEX: 27.43 KG/M2 | WEIGHT: 181 LBS | HEIGHT: 68 IN | DIASTOLIC BLOOD PRESSURE: 69 MMHG | HEART RATE: 83 BPM

## 2019-08-30 DIAGNOSIS — I10 BENIGN ESSENTIAL HTN: ICD-10-CM

## 2019-08-30 DIAGNOSIS — Z95.5 H/O RIGHT CORONARY ARTERY STENT PLACEMENT: ICD-10-CM

## 2019-08-30 DIAGNOSIS — E78.2 MIXED HYPERLIPIDEMIA: ICD-10-CM

## 2019-08-30 DIAGNOSIS — Z78.9 STATIN INTOLERANCE: ICD-10-CM

## 2019-08-30 PROCEDURE — 99214 OFFICE O/P EST MOD 30 MIN: CPT | Performed by: NURSE PRACTITIONER

## 2019-08-30 PROCEDURE — 99212 OFFICE O/P EST SF 10 MIN: CPT | Performed by: NURSE PRACTITIONER

## 2019-08-30 RX ORDER — TEMAZEPAM 15 MG/1
15 CAPSULE ORAL NIGHTLY PRN
COMMUNITY

## 2019-08-30 ASSESSMENT — ENCOUNTER SYMPTOMS
CLAUDICATION: 0
MYALGIAS: 0
CHILLS: 0
DEPRESSION: 0
COUGH: 0
VOMITING: 0
NERVOUS/ANXIOUS: 0
WEAKNESS: 0
DIZZINESS: 0
BLURRED VISION: 0
SENSORY CHANGE: 0
WEIGHT LOSS: 0
NAUSEA: 0
ABDOMINAL PAIN: 0
HEADACHES: 1

## 2019-08-30 NOTE — PROGRESS NOTES
[facility] - FollowUp Visit  Date of Service: 19    Brijesh Brown is here for follow up of dyslipidemia and formulary issues with insurance.     HPI  Pertinent Interval History since last visit:   Denies any current problems with Repatha    Current Prescription Lipid Lowering Medications - including dose:   Statin: None  Non-Statin: Repatha 140 every 2 weeks.   Current Lipid Lowering and Related Supplements:   None  Any Current Side Effects Potentially Related to Lipid Lowering therapy?   No  Current Adherence to Lipid Lowering Therapies:  Complete  Any Previous History of Statin Intolerance?   Atorvastatin, rosuvastatin, lovastatin, pravastatin   Pt experienced myalgia and flu-like sx.   Baseline Lipids Prior to Treatment:  (19):  , , HDL 42, .  Highest LDL in Epic since 2013 was 185.      SOCIAL HISTORY  Social History     Tobacco Use   Smoking Status Former Smoker   • Packs/day: 1.00   • Years: 20.00   • Pack years: 20.00   • Types: Cigarettes   • Last attempt to quit: 1984   • Years since quittin.6   Smokeless Tobacco Never Used   Tobacco Comment    quit '84, 20 py history      Change in weight: Stable  Exercise habits: no regular exercise program   Diet: common adult    Review of Systems   Constitutional: Negative for chills and weight loss.   HENT: Negative for nosebleeds.    Eyes: Negative for blurred vision.   Respiratory: Negative for cough.    Cardiovascular: Negative for chest pain, claudication and leg swelling.   Gastrointestinal: Negative for abdominal pain, nausea and vomiting.   Musculoskeletal: Negative for myalgias.   Neurological: Positive for headaches. Negative for dizziness, sensory change and weakness.   Psychiatric/Behavioral: Negative for depression. The patient is not nervous/anxious.      Physical Exam   Constitutional: He is oriented to person, place, and time. He appears well-developed and well-nourished. No distress.   Cardiovascular:  Normal rate and intact distal pulses.   No murmur heard.  Pulmonary/Chest: Breath sounds normal. No respiratory distress.   Musculoskeletal: Normal range of motion. He exhibits no edema.   Neurological: He is alert and oriented to person, place, and time. Coordination normal.   Skin: Skin is warm and dry.   Psychiatric: He has a normal mood and affect.     DATA REVIEW  Most Recent Lipid Panel:   Lab Results   Component Value Date    CHOLSTRLTOT 125 03/22/2019    CHOLSTRLTOT 245 (H) 02/08/2019    TRIGLYCERIDE 76 03/22/2019    TRIGLYCERIDE 121 02/08/2019    HDL 44 03/22/2019    HDL 42 02/08/2019    LDL 66 03/22/2019     (H) 02/08/2019     Other Pertinent Blood Work:   Lab Results   Component Value Date    SODIUM 133 (L) 04/04/2019    POTASSIUM 3.6 04/04/2019    CHLORIDE 105 04/04/2019    CO2 19 (L) 04/04/2019    ANION 9.0 04/04/2019    GLUCOSE 147 (H) 04/04/2019    BUN 13 04/04/2019    CREATININE 0.90 04/04/2019    CALCIUM 8.9 04/04/2019    ASTSGOT 17 04/04/2019    ALTSGPT 24 04/04/2019    ALKPHOSPHAT 72 04/04/2019    TBILIRUBIN 0.9 04/04/2019    ALBUMIN 3.8 04/04/2019    AGRATIO 1.5 04/04/2019    CREACTPROT 0.30 03/18/2019     Recent Imaging Studies:    None since last visit    ASSESSMENT AND PLAN  Patient Type, check all that apply:   Secondary Prevention  Established Atherosclerotic Cardiovascular Disease (ASCVD)  Yes, Details: Coronary artery stent placement (2004)  Other Established (non-atherosclerotic) Vascular Disease, if Present:    PAD  Evidence of Heterozygous Familial Hypercholesterolemia (FH): Possible (If yes, how was diagnosis made)  ACC/AHA Indication for Statin Therapy, michele all that apply:   Established ASCVD: Indication for High intensity statin    Calculated Risk for ASCVD, if applicable:  N/A  Other Significant Risk Markers, if any, michele all that apply:  None  National Lipid Association (NLA) Goal (if applicable):  LDL-C:   <70 mg/dL  Lifestyle Recommendations From Today’s Visit:   Pt not  interested at this time.   Statin Recommendations from Today's Visit  none  Non-Statin Medications Recommendations from Today’s Visit:   Continue therapy with PCSK9 inhibitor.  No problems receiving shipments    Indication for PCSK9 Inhibitor, if applicable:  ASCVD with suboptimal control of LDL-C despite maximally tolerated statin  Supplements Recommended at this visit:  None  Recommendations for Other Cardiovascular Risk Factors, michele all that apply:   Diabetes/Impaired Fasting Glucose- Continue with improving control.     Studies Ordered at Todays Visit:  None   Blood Work Ordered At Today’s visit:   Lipid, CMP, microalbumin now  Follow-Up: 6 months  Via phone to discuss labwork at end of Sept    JASPAL Hameed    CC:  Amanda R Magrini, M.D. Carlsen, Stephanie APRN Michael Bloch, MD

## 2019-09-06 ENCOUNTER — HOSPITAL ENCOUNTER (OUTPATIENT)
Dept: RADIOLOGY | Facility: MEDICAL CENTER | Age: 74
End: 2019-09-06

## 2019-09-26 ENCOUNTER — HOSPITAL ENCOUNTER (OUTPATIENT)
Dept: RADIOLOGY | Facility: MEDICAL CENTER | Age: 74
End: 2019-09-26

## 2019-09-27 NOTE — PROGRESS NOTES
"Interventional Radiology Consultation      Re: Brijesh Brown     MRN: [id number]   : 1945    Brijesh Brown was referred by Kyra Redmond PA-C. He is a 74 y.o. male seen in clinic for evaluation and possible vertebral augmentation. He is also under the care of Sheridan Jackman MD and Lv Joiner DO.    History of Present Illness:   has a history of low back pain since 2019 after a ureteral stent placement and lithotripsy. He did not have the pain prior to surgery but developed it almost immediately post op. A PET scan obtained for enlarged pelvic lymph nodes showed an incidental acute L1 vertebral compression fracture. The pain is in his low back and does not radiate down his legs but he does have associated pain around both flanks. It was \"100%\" at the onset and has improved in intensity since then. It has not changed in location or quality. Despite conservative measures that have included bracing, narcotics, ice, and activity restrictions, the pain has not improved he is referred to interventional radiology for evaluation and management of a painful vertebral compression fracture. Today, 's pain is \"60%\". Worst pain in the past 24 hours is \"60%.\" Resting makes the pain better. Prolonged standing and abrupt movements such as coughing makes the pain worse. He is not able to perform his usual activities due to the pain, specifically walking independently. He describes shopping at [employer] and holding onto the cart and is only able to walk about 5 minutes at a time. He has not been tested for osteoporosis. He has an additional history of coronary artery disease with 3 stents placed in the past and a described \"inoperable 90% stenosis\" of a coronary vessel. He describes an episode of angina during his MRI that caused him to abort the study. He has no personal history of cancer. His wife and daughter accompanied him to the consultation today.    He is seen today for review of imaging " studies and discussion of possible vertebral augmentation of a compression fracture at L1.     Past Medical History:   Diagnosis Date   • Angina at rest ([facility])    • Benign essential HTN 2012   • CAD (coronary artery disease)     S/P NIKKI X 3 in '04   • CVA, old, disturbances of vision     right eye   • Depression 2012   • Diabetes ([facility])    • Myalgia    • Other and unspecified hyperlipidemia 2013   • Overweight(278.02) 2012   • PVD (peripheral vascular disease) ([facility]) 2012     Past Surgical History:   Procedure Laterality Date   • INCISION HERNIA REPAIR     • ZZZ CARDIAC CATH       Social History     Socioeconomic History   • Marital status:      Spouse name: Not on file   • Number of children: Not on file   • Years of education: Not on file   • Highest education level: Not on file   Occupational History   • Not on file   Social Needs   • Financial resource strain: Not on file   • Food insecurity:     Worry: Not on file     Inability: Not on file   • Transportation needs:     Medical: Not on file     Non-medical: Not on file   Tobacco Use   • Smoking status: Former Smoker     Packs/day: 1.00     Years: 20.00     Pack years: 20.00     Types: Cigarettes     Last attempt to quit: 1984     Years since quittin.7   • Smokeless tobacco: Never Used   • Tobacco comment: quit 84, 20 py history   Substance and Sexual Activity   • Alcohol use: No   • Drug use: No   • Sexual activity: Not on file     Comment:  48 years   Lifestyle   • Physical activity:     Days per week: Not on file     Minutes per session: Not on file   • Stress: Not on file   Relationships   • Social connections:     Talks on phone: Not on file     Gets together: Not on file     Attends Oriental orthodox service: Not on file     Active member of club or organization: Not on file     Attends meetings of clubs or organizations: Not on file     Relationship status: Not on file   • Intimate partner violence:     Fear of  current or ex partner: Not on file     Emotionally abused: Not on file     Physically abused: Not on file     Forced sexual activity: Not on file   Other Topics Concern   • Not on file   Social History Narrative   • Not on file     Family History   Problem Relation Age of Onset   • Lung Disease Mother    • Heart Disease Father         CABG x3, then re-do CABG x 4   • Diabetes Sister    • Heart Disease Sister        Review of Systems   Constitutional: Negative.    Cardiovascular: Positive for chest pain.   Musculoskeletal: Positive for back pain. Negative for falls.   Neurological: Negative for tingling, sensory change and focal weakness.   Psychiatric/Behavioral: Negative for substance abuse.     A comprehensive 14-point review of systems was negative except as described above.     Labs:      Ref. Range 4/3/2019 23:24 4/4/2019 02:03   WBC Latest Ref Range: 4.8 - 10.8 K/uL 8.3    RBC Latest Ref Range: 4.70 - 6.10 M/uL 4.02 (L)    Hemoglobin Latest Ref Range: 14.0 - 18.0 g/dL 13.9 (L)    Hematocrit Latest Ref Range: 42.0 - 52.0 % 39.8 (L)    MCV Latest Ref Range: 81.4 - 97.8 fL 99.0 (H)    MCH Latest Ref Range: 27.0 - 33.0 pg 34.6 (H)    MCHC Latest Ref Range: 33.7 - 35.3 g/dL 34.9    RDW Latest Ref Range: 35.9 - 50.0 fL 44.1    Platelet Count Latest Ref Range: 164 - 446 K/uL 167    MPV Latest Ref Range: 9.0 - 12.9 fL 11.1    Neutrophils-Polys Latest Ref Range: 44.00 - 72.00 % 68.80    Neutrophils (Absolute) Latest Ref Range: 1.82 - 7.42 K/uL 5.69    Lymphocytes Latest Ref Range: 22.00 - 41.00 % 20.40 (L)    Lymphs (Absolute) Latest Ref Range: 1.00 - 4.80 K/uL 1.69    Monocytes Latest Ref Range: 0.00 - 13.40 % 8.70    Monos (Absolute) Latest Ref Range: 0.00 - 0.85 K/uL 0.72    Eosinophils Latest Ref Range: 0.00 - 6.90 % 1.50    Eos (Absolute) Latest Ref Range: 0.00 - 0.51 K/uL 0.12    Basophils Latest Ref Range: 0.00 - 1.80 % 0.20    Baso (Absolute) Latest Ref Range: 0.00 - 0.12 K/uL 0.02    Immature Granulocytes  Latest Ref Range: 0.00 - 0.90 % 0.40    Immature Granulocytes (abs) Latest Ref Range: 0.00 - 0.11 K/uL 0.03    Nucleated RBC Latest Units: /100 WBC 0.00    NRBC (Absolute) Latest Units: K/uL 0.00    Sodium Latest Ref Range: 135 - 145 mmol/L  133 (L)   Potassium Latest Ref Range: 3.6 - 5.5 mmol/L  3.6   Chloride Latest Ref Range: 96 - 112 mmol/L  105   Co2 Latest Ref Range: 20 - 33 mmol/L  19 (L)   Anion Gap Latest Ref Range: 0.0 - 11.9   9.0   Glucose Latest Ref Range: 65 - 99 mg/dL  147 (H)   Bun Latest Ref Range: 8 - 22 mg/dL  13   Creatinine Latest Ref Range: 0.50 - 1.40 mg/dL  0.90   GFR If  Latest Ref Range: >60 mL/min/1.73 m 2  >60   GFR If Non  Latest Ref Range: >60 mL/min/1.73 m 2  >60   Calcium Latest Ref Range: 8.5 - 10.5 mg/dL  8.9   AST(SGOT) Latest Ref Range: 12 - 45 U/L  17   ALT(SGPT) Latest Ref Range: 2 - 50 U/L  24   Alkaline Phosphatase Latest Ref Range: 30 - 99 U/L  72   Total Bilirubin Latest Ref Range: 0.1 - 1.5 mg/dL  0.9   Albumin Latest Ref Range: 3.2 - 4.9 g/dL  3.8   Total Protein Latest Ref Range: 6.0 - 8.2 g/dL  6.4   Globulin Latest Ref Range: 1.9 - 3.5 g/dL  2.6   A-G Ratio Latest Units: g/dL  1.5     Pathology:  none    Radiology:   MRI lumbar spine on September 26, 2019 at [facility]:  1. Right-sided neural foraminal narrowing at L5-S1.  2. Synovial cyst on the right at L4-5 causing right lateral recess stenosis.  3. Facet arthropathy at multiple levels.  4. Subacute burst fracture at L1 not resulting in central stenosis.       CT lumbar spine August 23, 2019 at [facility]:  1. Severe compression fracture at L1 with minimal retropulsion of the posterior cortex towards the spinal canal with no measurable central stenosis.  2. Degenerative changes at multiple levels.       DEXA study none available    Current Outpatient Medications   Medication Sig Dispense Refill   • temazepam (RESTORIL) 15 MG Cap Take 15 mg by mouth at bedtime as needed for Sleep.     • Evolocumab  (REPATHA) 140 MG/ML Solution Prefilled Syringe Inject 140 mg as instructed every 14 days for 360 days. 6 Syringe 3   • isosorbide dinitrate (ISORDIL) 5 MG Tab Take 1 Tab by mouth 3 times a day. 90 Tab 3   • isosorbide dinitrate (ISORDIL) 5 MG Tab TAKE 1 TABLET BY MOUTH THREE TIMES DAILY 270 Tab 1   • metoprolol SR (TOPROL XL) 25 MG TABLET SR 24 HR Take 1.5 Tabs by mouth every day. 180 Tab 2   • nitroglycerin (NITROSTAT) 0.4 MG SL Tab Place 1 Tab under tongue as needed for Chest Pain. 25 Tab 0   • metFORMIN (GLUCOPHAGE) 500 MG Tab Take 500 mg by mouth 2 times a day, with meals.     • zolpidem (AMBIEN) 5 MG Tab Take 5 mg by mouth at bedtime as needed for Sleep.     • aspirin EC (ECOTRIN) 81 MG Tablet Delayed Response Take 1 Tab by mouth every day. 30 Tab 11   • Insulin Degludec (TRESIBA FLEXTOUCH) 200 UNIT/ML Solution Pen-injector Inject 30 Units as instructed every evening.       No current facility-administered medications for this encounter.        Allergies   Allergen Reactions   • Statins [Hmg-Coa-R Inhibitors]      Caused muscle pain       Physical Exam   Constitutional: He is oriented to person, place, and time and well-developed, well-nourished, and in no distress. No distress.   Eyes: No scleral icterus.   Pulmonary/Chest: Effort normal. No respiratory distress.   Abdominal: He exhibits no distension.   Musculoskeletal:   Ambulates short distances with cane; in wheelchair for distance to consultation room   Neurological: He is alert and oriented to person, place, and time. He is not agitated and not disoriented. He displays no weakness, no tremor, facial symmetry and normal speech. No cranial nerve deficit. Coordination normal.   Skin: Skin is warm and dry. No rash noted. He is not diaphoretic. No erythema. No pallor.   Psychiatric: Mood, memory, affect and judgment normal.      Impression:   1. Acute vertebral compression fracture of L1 with Kümmell's sign, amenable to vertebral augmentation.  2. Acute low  back pain.  3. Coronary artery disease.  4. Angina.   5. Essential hypertension.  6. Diabetes mellitus.  7. Obstructive sleep apnea.  8. Peripheral arterial disease.     Plan:   Joe Lee MD has reviewed 's history and imaging studies, examined the patient, and discussed treatment options.  is a candidate for vertebral augmentation, however he and his family express reservations about the procedure due to his coronary artery disease. The acute pain the patient describes is directly related to the fracture and conservative measures have been inadequate for pain relief. We discussed the method of the procedure at length as well as outcome expectations and explained that it can take several weeks to optimize the results after the procedure. We additionally discussed the risks, including bleeding and infection, reaction to the moderate sedation medications, and cement extravasation causing compression of a nerve or the spinal canal or pulmonary embolus and subsequent interventions. There is a chance the procedure will not alleviate the back pain to his satisfaction. The patient may be at risk to develop future compression fractures and we recommended that he follow up for osteoporosis testing through his spine group or his PCP. We discussed alternatives to the procedure including conservative medical management and answered several questions about the natural course of an untreated compression fracture. The patient verbalizes understanding of his risks, benefits, and alternatives to the procedure and will contact us if he wishes to schedule. Should we move forward with vertebral augmentation, we would coordinate care with his cardiologist for clearance and anesthesia services for intraprocedure monitoring.     TRACY Marion with Joe Lee MD  Interventional Radiology  [facility]   [id number]  [place] [address]  [phone]

## 2019-10-07 ENCOUNTER — HOSPITAL ENCOUNTER (OUTPATIENT)
Dept: RADIOLOGY | Facility: MEDICAL CENTER | Age: 74
End: 2019-10-07
Attending: PHYSICIAN ASSISTANT
Payer: MEDICARE

## 2019-10-07 DIAGNOSIS — S32.010S WEDGE COMPRESSION FRACTURE OF FIRST LUMBAR VERTEBRA, SEQUELA: ICD-10-CM

## 2019-10-07 ASSESSMENT — ENCOUNTER SYMPTOMS
FOCAL WEAKNESS: 0
CONSTITUTIONAL NEGATIVE: 1
BACK PAIN: 1
FALLS: 0
SENSORY CHANGE: 0
TINGLING: 0

## 2019-10-07 ASSESSMENT — LIFESTYLE VARIABLES: SUBSTANCE_ABUSE: 0

## 2019-12-03 ENCOUNTER — TELEPHONE (OUTPATIENT)
Dept: VASCULAR LAB | Facility: MEDICAL CENTER | Age: 74
End: 2019-12-03

## 2019-12-03 NOTE — TELEPHONE ENCOUNTER
Refill prescription for Repatha completed for [facility] to fax back  Await coverage decision  Follow-up as scheduled    Michael J Bloch, MD  Certified Clinical Lipid Specialist  Medial Director, [facility]

## 2019-12-14 NOTE — ED TRIAGE NOTES
"Brijesh Brown  73 y.o. male  Chief Complaint   Patient presents with   • Palpitations      Pt states he felt his BP rise this am 0100 152/81, took his 25mg metropolol and 81mg ASA, denies N/V/ diaphoresis. 50mg of Metropolol total with in 24 hours. Pt denies any symptoms at this time.   /81   Pulse 74   Temp 36.8 °C (98.2 °F) (Temporal)   Resp 15   Ht 1.727 m (5' 8\")   Wt 103 kg (227 lb 1.2 oz)   SpO2 95%   BMI 34.53 kg/m²       Pt amb to triage with steady gait for above complaint. EKG done on arrival. VSS/  Pt is alert and oriented, speaking in full sentences, follows commands and responds appropriately to questions. NAD. Resp are even and unlabored.  Pt taken back to room.     " Patient Education   Thank you for visiting the [facility] in [place].    Interested in decreasing your wait time in the Urgent Care Clinic? ?Same day reservations can now be made on line.  Go to [email or web address]?to see the wait times at each [facility] and to make a reservation at the site that is most convenient for you. We will do our best to honor your reservation time, but please understand that wait times are subject to change once you arrive at the clinic.    It is difficult to recognize all elements of any illness or injury in a single visit. The examination, treatment, and x-rays received are on a preliminary basis only. A radiologist will also review your x-rays for final reading and you will be notified if the final reading is different than the preliminary reading. Call your primary care provider if you have questions or problems before your next appointment.  If symptoms worsen or do not resolve please follow-up with your Primary Care Provider (PCP), Urgent Care or the nearest Emergency Room for emergency symptoms. ?    Help us to grow our quality of service!  We want to improve - and you can help!  You may receive a survey in the mail or via email.  This is your opportunity to tell us what excellent service you received, and where we could use improvement.  We value your input!     Thank you again for visiting [facility].      Viral Upper Respiratory Illness (Adult)  You have a viral upper respiratory illness (URI), which is another term for the common cold. This illness is contagious during the first few days. It is spread through the air by coughing and sneezing. It may also be spread by direct contact (touching the sick person and then touching your own eyes, nose, or mouth). Frequent handwashing will decrease risk of spread. Most viral illnesses go away within 7 to 10 days with rest and simple home remedies. Sometimes the illness may last for  several weeks. Antibiotics will not kill a virus, and they are generally not prescribed for this condition.    Home care  · If symptoms are severe, rest at home for the first 2 to 3 days. When you resume activity, don't let yourself get too tired.  · Avoid being exposed to cigarette smoke (yours or others’).  · You may use acetaminophen or ibuprofen to control pain and fever, unless another medicine was prescribed. If you have chronic liver or kidney disease, have ever had a stomach ulcer or gastrointestinal bleeding, or are taking blood-thinning medicines, talk with your healthcare provider before using these medicines. Aspirin should never be given to anyone under 18 years of age who is ill with a viral infection or fever. It may cause severe liver or brain damage.  · Your appetite may be poor, so a light diet is fine. Avoid dehydration by drinking 6 to 8 glasses of fluids per day (water, soft drinks, juices, tea, or soup). Extra fluids will help loosen secretions in the nose and lungs.  · Over-the-counter cold medicines will not shorten the length of time you’re sick, but they may be helpful for the following symptoms: cough, sore throat, and nasal and sinus congestion. (Note: Do not use decongestants if you have high blood pressure.)  Follow-up care  Follow up with your healthcare provider, or as advised.  When to seek medical advice  Call your healthcare provider right away if any of these occur:  · Cough with lots of colored sputum (mucus)  · Severe headache; face, neck, or ear pain  · Difficulty swallowing due to throat pain  · Fever of 100.4°F (38°C) or higher, or as directed by your healthcare provider  Call 911  Call 911 if any of these occur:  · Chest pain, shortness of breath, wheezing, or difficulty breathing  · Coughing up blood  · Inability to swallow due to throat pain  Date Last Reviewed: 9/13/2015 © 2000-2018 [employer]. [address] [place] [address], [place] [address]. All rights  reserved. This information is not intended as a substitute for professional medical care. Always follow your healthcare professional's instructions.

## 2020-01-03 ENCOUNTER — TELEPHONE (OUTPATIENT)
Dept: VASCULAR LAB | Facility: MEDICAL CENTER | Age: 75
End: 2020-01-03

## 2020-01-03 NOTE — TELEPHONE ENCOUNTER
[facility]    Received letter from [facility] stating pt last received a 3 mo supply in July of 2019.  Spoke with pt's SO who reports pt has been receiving all medications without complaint.  If pt is not at goal at next visit, may want to assess for adherence.     Yoshi Strange, PharmD

## 2020-03-11 ENCOUNTER — TELEPHONE (OUTPATIENT)
Dept: VASCULAR LAB | Facility: MEDICAL CENTER | Age: 75
End: 2020-03-11

## 2020-03-11 NOTE — TELEPHONE ENCOUNTER
[facility]    Pt missed appt on 2/7/20.  LM for pt to call clinic and establish next appt.    Yoshi Strange, PharmD

## 2020-03-26 ENCOUNTER — TELEPHONE (OUTPATIENT)
Dept: VASCULAR LAB | Facility: MEDICAL CENTER | Age: 75
End: 2020-03-26

## 2020-03-26 ENCOUNTER — OFFICE VISIT (OUTPATIENT)
Dept: ENDOCRINOLOGY | Facility: MEDICAL CENTER | Age: 75
End: 2020-03-26
Payer: MEDICARE

## 2020-03-26 DIAGNOSIS — E78.5 HYPERLIPIDEMIA ASSOCIATED WITH TYPE 2 DIABETES MELLITUS (HCC): ICD-10-CM

## 2020-03-26 DIAGNOSIS — E11.65 TYPE 2 DIABETES MELLITUS WITH HYPERGLYCEMIA, WITHOUT LONG-TERM CURRENT USE OF INSULIN (HCC): ICD-10-CM

## 2020-03-26 DIAGNOSIS — E11.69 HYPERLIPIDEMIA ASSOCIATED WITH TYPE 2 DIABETES MELLITUS (HCC): ICD-10-CM

## 2020-03-26 DIAGNOSIS — Z79.84 LONG TERM (CURRENT) USE OF ORAL HYPOGLYCEMIC DRUGS: ICD-10-CM

## 2020-03-26 DIAGNOSIS — Z78.9 STATIN INTOLERANCE: ICD-10-CM

## 2020-03-26 PROCEDURE — 99204 OFFICE O/P NEW MOD 45 MIN: CPT | Mod: 95,CR | Performed by: INTERNAL MEDICINE

## 2020-03-26 RX ORDER — EMPAGLIFLOZIN 10 MG/1
1 TABLET, FILM COATED ORAL DAILY
Qty: 30 TAB | Refills: 3 | Status: SHIPPED | OUTPATIENT
Start: 2020-03-26 | End: 2020-05-07

## 2020-03-26 NOTE — TELEPHONE ENCOUNTER
[facility]    Pt's wife answered the phone, pt and his family prefer not to have any follow up scheduled with Vascular, will call again in 2-3 months to reschedule follow up.    Yoshi Strange, PharmD

## 2020-03-26 NOTE — PROGRESS NOTES
Chief Complaint:  Consult requested by Sheridan Jackman M.D. for initial evaluation of Type 2 Diabetes Mellitus.  This is a telehealth or virtual consult with audio and video because of the coronavirus pandemic.  Patient reported that he did not test positive for COVID-19.   This encounter was conducted via Zoom .  Verbal consent was obtained. Patient's identity was verified.  Patient was presented for a telehealth consultation via secure and encrypted videoconferencing technology.       HPI:   Brijesh Brown is a 74 y.o. male with Type 2 Diabetes Mellitus diagnosed in 2005.  He denies hospitalizations for DKA in the past.    A1c was 5.9% on December 2019.   He is overdue for a test of his A1c.  He is taking metformin extended release 500 mg 2 pills daily.  His A1c was elevated at 9% back in February 2019 but because of diet and exercise he was able to get his A1c down to 5.9% in December 2020.  His labs are not in the system but are in [facility] they will be scanned into the system.    He has hyperlipidemia but has a history of intolerance to statin and seemingly he also reports a history of intolerance of Repatha because of flulike symptoms after taking it.     He has a history of multiple episodes of acute pancreatitis of unknown etiology   He has a history of three-vessel coronary disease with stent placement    Other labs on December 2019 ([facility]) showed a sodium of 139, serum creatinine of 0.89, estimated GFR of greater than 60, calcium is 9.7, AST of 22, ALT 24, albumin 4.3, glucose 111, total cholesterol 180, HDL 53,  triglycerides 71.    He monitors blood glucose  1 times per day. Blood glucose values range:Breakfast: 120-140           He denies hypoglycemic episodes.   He  denies hypoglycemic unawareness. He denies episodes of severe hypoglycemia requiring third party assistance.  He  is not wearing a medical alert bracelet or necklace.  He does not a glucagon emergency kit.    He denies  "attending diabetes education classes.  Diet: \"healthy\" diet  in general.    Diabetes Complications   He  denies history of retinopathy.  He denies laser eye surgery. Last eye exam: He has an eye specialist but he does not recall the details at this time.  He reports history of peripheral sensory neuropathy.  He reports numbness, tingling in both feet.  He denies history of foot sores.   He denies history of kidney damage.  He is not on ACE inhibitor or ARB.   He reports history of coronary artery disease.  He  denies history of stroke and denies TIA.  He denies history of PAD.  He reports history of hyperlipidemia.  Continue statin because of statin intolerance.  He reports that he has tried all types of statin drugs but he did not tolerate them because of muscle related complications or muscle cramps.  He was tried on Repatha by his cardiologist but he did not tolerate the medication because of flulike symptoms.      ROS:     CONS:     No fever, no chills, no weight loss, reports fatigue   EYES:      No diplopia, no blurry vision, no redness of eyes, no swelling of eyelids   ENT:    No hearing loss, No ear pain, No sore throat, no dysphagia, no neck swelling   CV:     No chest pain, no palpitations, no claudication, no orthopnea, no PND   PULM:    No SOB, no cough, no hemoptysis, no wheezing    GI:   No nausea, no vomiting, no diarrhea, no constipation, no bloody stools   :  Passing urine well, no dysuria, no hematuria   ENDO:   No polyuria, no polydipsia, no heat intolerance, no cold intolerance   NEURO: No headaches, no dizziness, no convulsions, no tremors   MUSC:  No joint swellings, reports arthralgias, no myalgias, no weakness   SKIN:   No rash, no ulcers, no dry skin   PSYCH:   No depression, no anxiety, no difficulty sleeping       Past Medical History:  Patient Active Problem List    Diagnosis Date Noted   • Angina pectoris (HCC) 03/18/2019     Priority: High   • Peripheral arterial disease (HCC) " 01/25/2019     Priority: Medium   • Obstructive sleep apnea 10/15/2013     Priority: Medium   • H/O right coronary artery stent placement 04/23/2012     Priority: Medium   • CAD (coronary artery disease) 09/28/2010     Priority: Medium   • Hyperlipidemia 09/28/2010     Priority: Medium   • Statin intolerance 01/28/2014     Priority: Low   • BMI 34.0-34.9,adult 03/21/2019   • Benign essential HTN 04/23/2012   • Type II diabetes mellitus (HCC) 09/28/2010       Past Surgical History:  Past Surgical History:   Procedure Laterality Date   • INCISION HERNIA REPAIR     • ZZZ CARDIAC CATH          Allergies:  Statins [hmg-coa-r inhibitors]     Current Medications:    Current Outpatient Medications:   •  temazepam (RESTORIL) 15 MG Cap, Take 15 mg by mouth at bedtime as needed for Sleep., Disp: , Rfl:   •  Evolocumab (REPATHA) 140 MG/ML Solution Prefilled Syringe, Inject 140 mg as instructed every 14 days for 360 days., Disp: 6 Syringe, Rfl: 3  •  isosorbide dinitrate (ISORDIL) 5 MG Tab, Take 1 Tab by mouth 3 times a day., Disp: 90 Tab, Rfl: 3  •  isosorbide dinitrate (ISORDIL) 5 MG Tab, TAKE 1 TABLET BY MOUTH THREE TIMES DAILY, Disp: 270 Tab, Rfl: 1  •  metoprolol SR (TOPROL XL) 25 MG TABLET SR 24 HR, Take 1.5 Tabs by mouth every day., Disp: 180 Tab, Rfl: 2  •  nitroglycerin (NITROSTAT) 0.4 MG SL Tab, Place 1 Tab under tongue as needed for Chest Pain., Disp: 25 Tab, Rfl: 0  •  metFORMIN (GLUCOPHAGE) 500 MG Tab, Take 500 mg by mouth 2 times a day, with meals., Disp: , Rfl:   •  zolpidem (AMBIEN) 5 MG Tab, Take 5 mg by mouth at bedtime as needed for Sleep., Disp: , Rfl:   •  aspirin EC (ECOTRIN) 81 MG Tablet Delayed Response, Take 1 Tab by mouth every day., Disp: 30 Tab, Rfl: 11  •  Insulin Degludec (TRESIBA FLEXTOUCH) 200 UNIT/ML Solution Pen-injector, Inject 30 Units as instructed every evening., Disp: , Rfl:     Social History:  Social History     Socioeconomic History   • Marital status:      Spouse name: Not on  file   • Number of children: Not on file   • Years of education: Not on file   • Highest education level: Not on file   Occupational History   • Not on file   Social Needs   • Financial resource strain: Not on file   • Food insecurity     Worry: Not on file     Inability: Not on file   • Transportation needs     Medical: Not on file     Non-medical: Not on file   Tobacco Use   • Smoking status: Former Smoker     Packs/day: 1.00     Years: 20.00     Pack years: 20.00     Types: Cigarettes     Last attempt to quit: 1984     Years since quittin.2   • Smokeless tobacco: Never Used   • Tobacco comment: quit , 20 py history   Substance and Sexual Activity   • Alcohol use: No   • Drug use: No   • Sexual activity: Not on file     Comment:  48 years   Lifestyle   • Physical activity     Days per week: Not on file     Minutes per session: Not on file   • Stress: Not on file   Relationships   • Social connections     Talks on phone: Not on file     Gets together: Not on file     Attends [employer] service: Not on file     Active member of club or organization: Not on file     Attends meetings of clubs or organizations: Not on file     Relationship status: Not on file   • Intimate partner violence     Fear of current or ex partner: Not on file     Emotionally abused: Not on file     Physically abused: Not on file     Forced sexual activity: Not on file   Other Topics Concern   • Not on file   Social History Narrative   • Not on file        Family History:   Family History   Problem Relation Age of Onset   • Lung Disease Mother    • Heart Disease Father         CABG x3, then re-do CABG x 4   • Diabetes Sister    • Heart Disease Sister        PHYSICAL EXAM:   Vital signs: There were no vitals taken for this visit.  GENERAL: Well-developed, well-nourished  in no apparent distress.   EYE: No ocular and eyelid asymmetry, Anicteric sclerae,  PERRL, No exophthalmos or lidlag  HENT: Hearing grossly intact,  Normocephalic, atraumatic. Pink, moist mucous membranes, No exudate  NECK: Supple. Trachea midline. thyroid is normal in size without visible nodules   CARDIOVASCULAR: Normal precordial impulse seen, carotid pulsations seem to be normal  LUNGS: Symmetrical chest expansion, no abnormal breath sounds heard  ABDOMEN: Simply soft, no masses seen no swelling  EXTREMITIES: No clubbing, cyanosis, or edema.   NEUROLOGICAL: Cranial nerves II-XII appear grossly intact  No visible tremor with both outstretched hands, no obvious signs of muscle wasting  LYMPH: No cervical, supraclavicular,  adenopathy seen  SKIN: No rashes, lesions. Turgor appears to be normal  FOOT: No visible foot sores or calluses    Labs:  Lab Results   Component Value Date/Time    HBA1C 9.0 (H) 02/08/2019 1153    AVGLUC 212 02/08/2019 1153       Lab Results   Component Value Date/Time    WBC 8.3 04/03/2019 11:24 PM    RBC 4.02 (L) 04/03/2019 11:24 PM    HEMOGLOBIN 13.9 (L) 04/03/2019 11:24 PM    MCV 99.0 (H) 04/03/2019 11:24 PM    MCH 34.6 (H) 04/03/2019 11:24 PM    MCHC 34.9 04/03/2019 11:24 PM    RDW 44.1 04/03/2019 11:24 PM    MPV 11.1 04/03/2019 11:24 PM       Lab Results   Component Value Date/Time    SODIUM 133 (L) 04/04/2019 02:03 AM    POTASSIUM 3.6 04/04/2019 02:03 AM    CHLORIDE 105 04/04/2019 02:03 AM    CO2 19 (L) 04/04/2019 02:03 AM    ANION 9.0 04/04/2019 02:03 AM    GLUCOSE 147 (H) 04/04/2019 02:03 AM    BUN 13 04/04/2019 02:03 AM    CREATININE 0.90 04/04/2019 02:03 AM    CREATININE 0.98 10/01/2013    CALCIUM 8.9 04/04/2019 02:03 AM    ASTSGOT 17 04/04/2019 02:03 AM    ALTSGPT 24 04/04/2019 02:03 AM    TBILIRUBIN 0.9 04/04/2019 02:03 AM    ALBUMIN 3.8 04/04/2019 02:03 AM    TOTPROTEIN 6.4 04/04/2019 02:03 AM    GLOBULIN 2.6 04/04/2019 02:03 AM    AGRATIO 1.5 04/04/2019 02:03 AM       Lab Results   Component Value Date/Time    CHOLSTRLTOT 125 03/22/2019 0732    TRIGLYCERIDE 76 03/22/2019 0732    HDL 44 03/22/2019 0732    LDL 66 03/22/2019  0732       Lab Results   Component Value Date/Time    MICROALBCALC 5.4 06/26/2014 07:41 AM    MICRALB 9.4 03/22/2019 07:32 AM        No results found for: TSHULTRASEN  No results found for: FREEDIR  No results found for: FREET3  No results found for: THYSTIMIG      ASSESSMENT/PLAN:     1. Type 2 diabetes mellitus with hyperglycemia, without long-term current use of insulin (HCC)  Fairly controlled at baseline  Recommend that he complete an A1c, CBC, CMP, urine microalbumin and TSH and other labs  Recommend that he continue taking Metformin extended release 500 mg 2 pills daily  I am starting him on Jardiance 10 mg daily because of his history of diabetes and cardiovascular disease  Reviewed the cardiovascular benefits of this medication    Because of his history of acute pancreatitis of unknown etiology he is not a good candidate for GLP-1 analog therapy.    Discussed importance of adequate hydration  Reviewed the side effects of this medication  Recommend that he watch his carb intake  I offered diabetes education referral but the patient does not want to go outside at this time  We will plan for follow-up with a telemedicine visit in 4 to 6 weeks to review his response to the medications    2. Hyperlipidemia associated with type 2 diabetes mellitus (HCC)  Uncontrolled  I am checking a fasting lipid panel today  He cannot tolerate statins or PCSK9 inhibitors  I recommend that he follow a low-fat diet  Consider Zetia    3. Statin intolerance  Patient reports statin intolerance  Patient also reports Repatha intolerance    4. Long term (current) use of oral hypoglycemic drugs  Patient is on multiple oral agents for type 2 diabetes management      Return in about 4 weeks (around 4/23/2020).       This patient during there office visit was started on new medication.  Side effects of new medications were discussed with the patient today in the office. The patient was supplied paperwork on this new medication.    Thank  you kindly for allowing me to participate in the diabetes care plan for this patient.    Kuldeep Ennis MD, [facility]  03/26/20    CC:   Sheridan Jackman M.D.

## 2020-03-27 NOTE — PATIENT INSTRUCTIONS
59-year-old white female returns for surveillance colonoscopy with an adenoma on her last study in 2012 by Dr. Mistry.  She also has known diverticulosis.  No major interval illnesses.  There is no family history of colon cancer or polyps.   Monitor your blood sugar regularly as discussed at this visit.  Bring your blood sugar log book or meter to each visit.  Take your medications regularly as we discussed at this visit.  Call my office if you are having difficulty with uncontrolled blood sugars.  Notify me for difficulty with low blood sugar. Work on diet including food portion size control and schedule daily exercise.  Please have your labs drawn prior to your next visit so that we may discuss them at the time of your next visit.

## 2020-03-31 ENCOUNTER — TELEPHONE (OUTPATIENT)
Dept: ENDOCRINOLOGY | Facility: MEDICAL CENTER | Age: 75
End: 2020-03-31

## 2020-03-31 NOTE — TELEPHONE ENCOUNTER
Patient's wife called requesting samples for Jardiance 10 mg medication. She stated Dr. Ennis has started him on this and he is running out but due to expense he is requesting samples.    Okay to leave detailed voicemail    Thanks

## 2020-04-01 NOTE — TELEPHONE ENCOUNTER
Spoke with Dr. Ennis and because of the lack of samples for Jardiance. Dr. Ennis told me to give him Farxiga 10mg.

## 2020-04-01 NOTE — TELEPHONE ENCOUNTER
Phone Number Called: [phone]    Call outcome: Spoke to patient regarding message below.    Message: Contacted patient to let him know samples were set aside for him to

## 2020-04-24 DIAGNOSIS — E11.65 TYPE 2 DIABETES MELLITUS WITH HYPERGLYCEMIA, WITHOUT LONG-TERM CURRENT USE OF INSULIN (HCC): ICD-10-CM

## 2020-04-24 LAB
ALBUMIN SERPL-MCNC: 4.2 G/DL (ref 3.7–4.7)
ALBUMIN/CREAT UR: <7 MG/G CREAT (ref 0–29)
ALBUMIN/GLOB SERPL: 1.8 {RATIO} (ref 1.2–2.2)
ALP SERPL-CCNC: 90 IU/L (ref 39–117)
ALT SERPL-CCNC: 23 IU/L (ref 0–44)
AST SERPL-CCNC: 17 IU/L (ref 0–40)
BASOPHILS # BLD AUTO: 0 X10E3/UL (ref 0–0.2)
BASOPHILS NFR BLD AUTO: 0 %
BILIRUB SERPL-MCNC: 0.6 MG/DL (ref 0–1.2)
BUN SERPL-MCNC: 17 MG/DL (ref 8–27)
BUN/CREAT SERPL: 19 (ref 10–24)
C PEPTIDE SERPL-MCNC: 2.2 NG/ML (ref 1.1–4.4)
CALCIUM SERPL-MCNC: 9.1 MG/DL (ref 8.6–10.2)
CHLORIDE SERPL-SCNC: 102 MMOL/L (ref 96–106)
CHOLEST SERPL-MCNC: 169 MG/DL (ref 100–199)
CO2 SERPL-SCNC: 24 MMOL/L (ref 20–29)
CREAT SERPL-MCNC: 0.91 MG/DL (ref 0.76–1.27)
CREAT UR-MCNC: 44.9 MG/DL
EOSINOPHIL # BLD AUTO: 0.2 X10E3/UL (ref 0–0.4)
EOSINOPHIL NFR BLD AUTO: 4 %
ERYTHROCYTE [DISTWIDTH] IN BLOOD BY AUTOMATED COUNT: 11.6 % (ref 11.6–15.4)
GAD65 AB SER IA-ACNC: <5 U/ML (ref 0–5)
GLOBULIN SER CALC-MCNC: 2.4 G/DL (ref 1.5–4.5)
GLUCOSE SERPL-MCNC: 136 MG/DL (ref 65–99)
HCT VFR BLD AUTO: 40.6 % (ref 37.5–51)
HDLC SERPL-MCNC: 52 MG/DL
HGB BLD-MCNC: 13.8 G/DL (ref 13–17.7)
IMM GRANULOCYTES # BLD AUTO: 0 X10E3/UL (ref 0–0.1)
IMM GRANULOCYTES NFR BLD AUTO: 0 %
IMMATURE CELLS  115398: ABNORMAL
LABORATORY COMMENT REPORT: ABNORMAL
LDLC SERPL CALC-MCNC: 105 MG/DL (ref 0–99)
LYMPHOCYTES # BLD AUTO: 1.6 X10E3/UL (ref 0.7–3.1)
LYMPHOCYTES NFR BLD AUTO: 31 %
MCH RBC QN AUTO: 36.2 PG (ref 26.6–33)
MCHC RBC AUTO-ENTMCNC: 34 G/DL (ref 31.5–35.7)
MCV RBC AUTO: 107 FL (ref 79–97)
MICROALBUMIN UR-MCNC: <3 UG/ML
MONOCYTES # BLD AUTO: 0.5 X10E3/UL (ref 0.1–0.9)
MONOCYTES NFR BLD AUTO: 10 %
MORPHOLOGY BLD-IMP: ABNORMAL
NEUTROPHILS # BLD AUTO: 2.9 X10E3/UL (ref 1.4–7)
NEUTROPHILS NFR BLD AUTO: 55 %
NRBC BLD AUTO-RTO: ABNORMAL %
PLATELET # BLD AUTO: 105 X10E3/UL (ref 150–450)
POTASSIUM SERPL-SCNC: 4.2 MMOL/L (ref 3.5–5.2)
PROT SERPL-MCNC: 6.6 G/DL (ref 6–8.5)
RBC # BLD AUTO: 3.81 X10E6/UL (ref 4.14–5.8)
SODIUM SERPL-SCNC: 140 MMOL/L (ref 134–144)
T4 FREE SERPL-MCNC: 1.17 NG/DL (ref 0.82–1.77)
TRIGL SERPL-MCNC: 58 MG/DL (ref 0–149)
TSH SERPL DL<=0.005 MIU/L-ACNC: 4.77 UIU/ML (ref 0.45–4.5)
VLDLC SERPL CALC-MCNC: 12 MG/DL (ref 5–40)
WBC # BLD AUTO: 5.2 X10E3/UL (ref 3.4–10.8)

## 2020-04-27 ENCOUNTER — TELEPHONE (OUTPATIENT)
Dept: ENDOCRINOLOGY | Facility: MEDICAL CENTER | Age: 75
End: 2020-04-27

## 2020-04-27 NOTE — TELEPHONE ENCOUNTER
----- Message from Kuldeep Ennis M.D. sent at 4/24/2020  6:42 PM PDT -----  Please call patient    I saw him on March 2020- I ordered labs -  He didn't do those labs until April 24, 2020 for unknown reasons    The a1c was missed by the lab but   I am re ordering it and he can do it again with the lab if possible     His cholesterol is fair    He does not have signs of diabetic kidney damage    He is making insulin still     His thyroid levels are abnormal but not far too off     I am adding an a1c it needs to be re drawn     Thanks     Dr. Ennis

## 2020-04-27 NOTE — TELEPHONE ENCOUNTER
Spoke to patient about test results. Went over everything relayed from Dr Ennis. Can you please print test results and the new order for the a1c and send it to the patient per his request?

## 2020-05-05 LAB — HBA1C MFR BLD: 6.4 % (ref 4.8–5.6)

## 2020-05-07 ENCOUNTER — TELEMEDICINE (OUTPATIENT)
Dept: ENDOCRINOLOGY | Facility: MEDICAL CENTER | Age: 75
End: 2020-05-07
Payer: MEDICARE

## 2020-05-07 DIAGNOSIS — E03.8 SUBCLINICAL HYPOTHYROIDISM: ICD-10-CM

## 2020-05-07 DIAGNOSIS — E78.2 MIXED HYPERLIPIDEMIA: Chronic | ICD-10-CM

## 2020-05-07 DIAGNOSIS — Z79.84 LONG TERM (CURRENT) USE OF ORAL HYPOGLYCEMIC DRUGS: ICD-10-CM

## 2020-05-07 DIAGNOSIS — E11.9 CONTROLLED TYPE 2 DIABETES MELLITUS WITHOUT COMPLICATION, WITHOUT LONG-TERM CURRENT USE OF INSULIN (HCC): ICD-10-CM

## 2020-05-07 PROCEDURE — 99214 OFFICE O/P EST MOD 30 MIN: CPT | Mod: 95,CR | Performed by: INTERNAL MEDICINE

## 2020-05-07 RX ORDER — PIOGLITAZONEHYDROCHLORIDE 15 MG/1
15 TABLET ORAL DAILY
Qty: 90 TAB | Refills: 2 | Status: SHIPPED | OUTPATIENT
Start: 2020-05-07 | End: 2020-08-05

## 2020-05-07 RX ORDER — LEVOTHYROXINE SODIUM 0.03 MG/1
25 TABLET ORAL
Qty: 90 TAB | Refills: 2 | Status: SHIPPED | OUTPATIENT
Start: 2020-05-07

## 2020-05-07 NOTE — PROGRESS NOTES
CHIEF COMPLAINT: Patient is here for follow up of Type 2 Diabetes Mellitus.   Patient was presented for a telehealth consultation via secure and encrypted videoconferencing technology. This encounter was conducted via Zoom . Verbal consent was obtained. Patient's identity was verified..    HPI:     Brijesh Brown is a 75 y.o. male with Type 2 Diabetes Mellitus here for follow up.    Labs from 5/4/2020 show HbA1c is good at 5.4%    On follow-up he is just taking Metformin extended release 500 mg 2 pills in the morning.  He is afraid of increasing the metformin to the maximum dose because he has some nausea and headaches which may or may not be related to metformin.     I prescribed him Jardiance last visit but he could not afford it  He also has a history of idiopathic pancreatitis so he is not a good candidate for GLP-1 analog therapy      He has a history of hyperlipidemia that is diet controlled.    He reports statin intolerance  He also reports that he did not tolerate Repatha  His last LDL cholesterol is fair at 105 on April 22, 2020  He has a history of coronary artery disease and peripheral arterial disease  He denies a history of congestive heart failure      He has a new diagnosis of subclinical hypothyroidism  His TSH is elevated at 4.7 with normal free T4 levels of 1.17          BG Diary:05/07/20  Breakfast: 100, 99, 125    Weight has been stable    Diabetes Complications   Retinopathy: No known retinopathy.  Last eye exam: Patient is overdue for an eye exam  Neuropathy: Denies paresthesias or numbness in hands or feet. Denies any foot wounds.  Exercise: Minimal.  Diet: Fair.  Patient's medications, allergies, and social histories were reviewed and updated as appropriate.    ROS:     CONS:     No fever, no chills   EYES:     No diplopia, no blurry vision   CV:           No chest pain, no palpitations   PULM:     No SOB, no cough, no hemoptysis.   GI:            No nausea, no vomiting, no diarrhea, no  constipation   ENDO:     No polyuria, no polydipsia, no heat intolerance, no cold intolerance       Past Medical History:  Problem List:  2019: Normocytic anemia  2019: BMI 34.0-34.9,adult  2019: Angina pectoris (HCC)  2019: Unstable angina (HCC)  2019: Peripheral arterial disease (HCC)  2014: Nephrolithiasis  2014: Acute diverticulitis  2014: Obstructive nephropathy  2014: Statin intolerance  2013: Other and unspecified hyperlipidemia  2013-10: Obstructive sleep apnea  2012: H/O right coronary artery stent placement  2012: Depression  2012: Benign essential HTN  2012: Overweight  2010: Type II diabetes mellitus (HCC)  2010: CAD (coronary artery disease)  2010: Hyperlipidemia  2010: Personal history of tobacco use  2010: Vitamin D deficiency      Past Surgical History:  Past Surgical History:   Procedure Laterality Date   • INCISION HERNIA REPAIR     • ZZZ CARDIAC CATH          Allergies:  Statins [hmg-coa-r inhibitors]     Social History:  Social History     Tobacco Use   • Smoking status: Former Smoker     Packs/day: 1.00     Years: 20.00     Pack years: 20.00     Types: Cigarettes     Last attempt to quit: 1984     Years since quittin.3   • Smokeless tobacco: Never Used   • Tobacco comment: quit '84, 20 py history   Substance Use Topics   • Alcohol use: No   • Drug use: No        Family History:   family history includes Diabetes in his sister; Heart Disease in his father and sister; Lung Disease in his mother.      PHYSICAL EXAM:   OBJECTIVE:  Vital signs: There were no vitals taken for this visit.  GENERAL: Well-developed, well-nourished in no apparent distress.   EYE:  No ocular asymmetry, PERRLA  HENT: Pink, moist mucous membranes.    NECK: No thyromegaly.   CARDIOVASCULAR: Normal precordial impulse seen with normal carotid pulsation  LUNGS: Symmetrical chest expansion with normal phonation of voice   ABDOMEN: Non obese abdomen with no visible  organomegaly  EXTREMITIES: No clubbing, cyanosis, or edema.   NEUROLOGICAL: No gross focal motor abnormalities   LYMPH: No cervical adenopathy seen.   SKIN: No rashes, lesions.       Labs:  Lab Results   Component Value Date/Time    HBA1C 6.4 (H) 05/04/2020 07:25 AM    HBA1C 9.0 (H) 02/08/2019 11:53 AM        Lab Results   Component Value Date/Time    WBC 5.2 04/22/2020 04:23 AM    WBC 8.3 04/03/2019 11:24 PM    RBC 3.81 (L) 04/22/2020 04:23 AM    RBC 4.02 (L) 04/03/2019 11:24 PM    HEMOGLOBIN 13.8 04/22/2020 04:23 AM    HEMOGLOBIN 13.9 (L) 04/03/2019 11:24 PM     (H) 04/22/2020 04:23 AM    MCV 99.0 (H) 04/03/2019 11:24 PM    MCH 36.2 (H) 04/22/2020 04:23 AM    MCH 34.6 (H) 04/03/2019 11:24 PM    MCHC 34.0 04/22/2020 04:23 AM    MCHC 34.9 04/03/2019 11:24 PM    RDW 11.6 04/22/2020 04:23 AM    RDW 44.1 04/03/2019 11:24 PM    MPV 11.1 04/03/2019 11:24 PM       Lab Results   Component Value Date/Time    SODIUM 140 04/22/2020 04:23 AM    SODIUM 133 (L) 04/04/2019 02:03 AM    POTASSIUM 4.2 04/22/2020 04:23 AM    POTASSIUM 3.6 04/04/2019 02:03 AM    CHLORIDE 102 04/22/2020 04:23 AM    CHLORIDE 105 04/04/2019 02:03 AM    CO2 24 04/22/2020 04:23 AM    CO2 19 (L) 04/04/2019 02:03 AM    ANION 9.0 04/04/2019 02:03 AM    GLUCOSE 136 (H) 04/22/2020 04:23 AM    GLUCOSE 147 (H) 04/04/2019 02:03 AM    BUN 17 04/22/2020 04:23 AM    BUN 13 04/04/2019 02:03 AM    CREATININE 0.91 04/22/2020 04:23 AM    CREATININE 0.90 04/04/2019 02:03 AM    CREATININE 0.98 10/01/2013    CALCIUM 9.1 04/22/2020 04:23 AM    CALCIUM 8.9 04/04/2019 02:03 AM    ASTSGOT 17 04/22/2020 04:23 AM    ASTSGOT 17 04/04/2019 02:03 AM    ALTSGPT 23 04/22/2020 04:23 AM    ALTSGPT 24 04/04/2019 02:03 AM    TBILIRUBIN 0.6 04/22/2020 04:23 AM    TBILIRUBIN 0.9 04/04/2019 02:03 AM    ALBUMIN 4.2 04/22/2020 04:23 AM    ALBUMIN 3.8 04/04/2019 02:03 AM    TOTPROTEIN 6.6 04/22/2020 04:23 AM    TOTPROTEIN 6.4 04/04/2019 02:03 AM    GLOBULIN 2.4 04/22/2020 04:23 AM     GLOBULIN 2.6 04/04/2019 02:03 AM    AGRATIO 1.8 04/22/2020 04:23 AM    AGRATIO 1.5 04/04/2019 02:03 AM       Lab Results   Component Value Date/Time    CHOLSTRLTOT 169 04/22/2020 0423    TRIGLYCERIDE 58 04/22/2020 0423    HDL 52 04/22/2020 0423     (H) 04/22/2020 0423       Lab Results   Component Value Date/Time    MICROALBCALC 5.4 06/26/2014 07:41 AM    MICRALB <3.0 04/22/2020 04:23 AM        No results found for: TSHULTRASEN  Lab Results   Component Value Date/Time    FREEDIR 1.17 04/22/2020 0423     No results found for: FREET3  No results found for: THYSTIMIG        ASSESSMENT/PLAN:     1. Controlled type 2 diabetes mellitus without complication, without long-term current use of insulin (HCC)  Improved control  A1c is better at 6.4%  He is not a candidate for GLP-1 analog therapy  He cannot afford Jardiance  Recommend that he continue Metformin 500 mg 2 pills daily  I am putting him on Actos 15 mg daily  Reviewed the side effects of the medication  Recommend he watch his carb intake  We will plan for follow-up in 3 months with a repeat of his A1c    2. Mixed hyperlipidemia  Fair control  He has a history of statin intolerance and Repatha intolerance  Follow low-fat diet  Repeat fasting lipids in 12 months    3. Subclinical hypothyroidism  Uncontrolled  Reviewed pathogenesis of this condition  Start levothyroxine 25 MCG daily  Reviewed importance of adherence  We will plan for repeat TSH in 3 months    4. Long term (current) use of oral hypoglycemic drugs  Patient is on multiple oral agents for type 2 diabetes management      Return in about 3 months (around 8/7/2020).      This patient during there office visit today was started on a new medication.  Side effects of the new medication were discussed with the patient today in the office.     Thank you kindly for allowing me to participate in the diabetes care plan for this patient.    Kuldeep Ennis MD, FACE, [facility]  05/07/20    CC:   Sheridan Jackman,  M.D.

## 2020-05-08 ENCOUNTER — TELEPHONE (OUTPATIENT)
Dept: ENDOCRINOLOGY | Facility: MEDICAL CENTER | Age: 75
End: 2020-05-08

## 2020-05-08 NOTE — TELEPHONE ENCOUNTER
Received request from pt  requesting copy of most recent   A1-C  Lab results to be mailed to home.  Confirmed with Dr. Raymon corbett to send

## 2020-07-14 ENCOUNTER — TELEPHONE (OUTPATIENT)
Dept: VASCULAR LAB | Facility: MEDICAL CENTER | Age: 75
End: 2020-07-14

## 2020-07-14 NOTE — TELEPHONE ENCOUNTER
[facility]     Pt missed appt on 2/7/20.  LM for pt to call clinic and establish next appt.  PCSK9 therapy will require follow up visit.      Yoshi Strange, RaeD

## 2020-09-11 ENCOUNTER — TELEPHONE (OUTPATIENT)
Dept: VASCULAR LAB | Facility: MEDICAL CENTER | Age: 75
End: 2020-09-11

## 2020-09-11 NOTE — TELEPHONE ENCOUNTER
[facility]    Unable to reach pt by phone to schedule for lipid follow up.  Sent letter of compliance.    Yoshi Strange, PharmD

## 2020-11-23 ENCOUNTER — TELEPHONE (OUTPATIENT)
Dept: VASCULAR LAB | Facility: MEDICAL CENTER | Age: 75
End: 2020-11-23

## 2020-11-23 NOTE — TELEPHONE ENCOUNTER
[facility]    Left a voicemail to remind pt to call the clinic to follow up with lipid and vascular care. Emergency contacts have the same phone number.     Pt has received 5 phone calls and one letter of compliance over several months.  Will discharge from our clinic unless we hear from the pt and can establish care again.    Yoshi Strange, PharmD     CC  Dr Jackman Fax [phone]  Dr Bloch

## 2021-01-15 DIAGNOSIS — Z23 NEED FOR VACCINATION: ICD-10-CM

## 2021-03-12 ENCOUNTER — IMMUNIZATION (OUTPATIENT)
Dept: FAMILY PLANNING/WOMEN'S HEALTH CLINIC | Facility: IMMUNIZATION CENTER | Age: 76
End: 2021-03-12
Attending: INTERNAL MEDICINE
Payer: MEDICARE

## 2021-03-12 DIAGNOSIS — Z23 NEED FOR VACCINATION: ICD-10-CM

## 2021-03-12 DIAGNOSIS — Z23 ENCOUNTER FOR VACCINATION: Primary | ICD-10-CM

## 2021-03-12 PROCEDURE — 0011A MODERNA SARS-COV-2 VACCINE: CPT | Performed by: INTERNAL MEDICINE

## 2021-03-12 PROCEDURE — 91301 MODERNA SARS-COV-2 VACCINE: CPT | Performed by: INTERNAL MEDICINE

## 2021-04-10 ENCOUNTER — IMMUNIZATION (OUTPATIENT)
Dept: FAMILY PLANNING/WOMEN'S HEALTH CLINIC | Facility: IMMUNIZATION CENTER | Age: 76
End: 2021-04-10
Attending: INTERNAL MEDICINE
Payer: MEDICARE

## 2021-04-10 DIAGNOSIS — Z23 ENCOUNTER FOR VACCINATION: Primary | ICD-10-CM

## 2021-04-10 PROCEDURE — 0012A MODERNA SARS-COV-2 VACCINE: CPT | Performed by: NURSE PRACTITIONER

## 2021-04-10 PROCEDURE — 91301 MODERNA SARS-COV-2 VACCINE: CPT | Performed by: NURSE PRACTITIONER

## 2021-07-27 NOTE — TELEPHONE ENCOUNTER
WIN Hodge/Traci       Patient's wife said he had angina for most of the night, last night and spoke to on-call doctor, Dr. Aviles last night. Dr. Aviles said he could take another Isosorbide. He mentioned that the patient could take more Isosorbide but to check with Dr. Waterman first. She can be reached at [phone].        Called pt's wife (Patti) back, per Patti yesterday late afternoon, pt's BP-150/84 and pt developed chest pressure, he took his scheduled Isordil, NTG x 2, BP is improved w/ reported BP range 120/70s, then early last night he had another episode of Angina so he took extra dose of Isordil and called on call MD-Dr Aviles, they were instructed that taking extra dose of Isordil should be fine and pt is on a very low dose and talked to Dr Waterman if she would like to increased it. Wife reports this morning pt's BP-127/77, she has 1/10 chest pressure so he took his scheduled Isordil, now he is doing good. Informed wife (Patti) that Dr Waterman is out of the office, will discussed w/ Dr Finnegan and will give them a call back. Wife also endorsed pt have lost 23lbs of weight last 2wks unsure if that would affect anything on considering his dosing.     I agree with the Resident's findings and plan, as documented in today's note.    Awais Coffey MD

## 2022-12-21 ENCOUNTER — APPOINTMENT (RX ONLY)
Dept: URBAN - METROPOLITAN AREA CLINIC 22 | Facility: CLINIC | Age: 77
Setting detail: DERMATOLOGY
End: 2022-12-21

## 2022-12-21 DIAGNOSIS — L57.0 ACTINIC KERATOSIS: ICD-10-CM

## 2022-12-21 PROBLEM — D48.5 NEOPLASM OF UNCERTAIN BEHAVIOR OF SKIN: Status: ACTIVE | Noted: 2022-12-21

## 2022-12-21 PROCEDURE — ? PRESCRIPTION

## 2022-12-21 PROCEDURE — ? BIOPSY BY SHAVE METHOD

## 2022-12-21 PROCEDURE — 11102 TANGNTL BX SKIN SINGLE LES: CPT

## 2022-12-21 PROCEDURE — 99203 OFFICE O/P NEW LOW 30 MIN: CPT | Mod: 25

## 2022-12-21 PROCEDURE — ? COUNSELING

## 2022-12-21 RX ORDER — FLUOROURACIL 5 MG/G
1 CREAM TOPICAL BID
Qty: 40 | Refills: 0 | Status: ERX | COMMUNITY
Start: 2022-12-21

## 2022-12-21 RX ADMIN — FLUOROURACIL 1: 5 CREAM TOPICAL at 00:00

## 2022-12-21 ASSESSMENT — LOCATION DETAILED DESCRIPTION DERM
LOCATION DETAILED: RIGHT MEDIAL FRONTAL SCALP
LOCATION DETAILED: LEFT NASAL DORSUM
LOCATION DETAILED: LEFT SUPERIOR PARIETAL SCALP
LOCATION DETAILED: LEFT CENTRAL MALAR CHEEK

## 2022-12-21 ASSESSMENT — LOCATION ZONE DERM
LOCATION ZONE: FACE
LOCATION ZONE: SCALP
LOCATION ZONE: NOSE

## 2022-12-21 ASSESSMENT — LOCATION SIMPLE DESCRIPTION DERM
LOCATION SIMPLE: RIGHT SCALP
LOCATION SIMPLE: SCALP
LOCATION SIMPLE: NOSE
LOCATION SIMPLE: LEFT CHEEK

## 2022-12-21 NOTE — PROCEDURE: BIOPSY BY SHAVE METHOD
Detail Level: Detailed
Depth Of Biopsy: dermis
Was A Bandage Applied: Yes
Size Of Lesion In Cm: 1.8
X Size Of Lesion In Cm: 0
Biopsy Type: H and E
Biopsy Method: Personna blade
Anesthesia Type: bacteriostatic saline
Anesthesia Volume In Cc: 2
Hemostasis: Aluminum Chloride
Wound Care: Petrolatum
Dressing: pressure dressing with telfa
Destruction After The Procedure: No
Type Of Destruction Used: Curettage
Curettage Text: The wound bed was treated with curettage after the biopsy was performed.
Cryotherapy Text: The wound bed was treated with cryotherapy after the biopsy was performed.
Electrodesiccation Text: The wound bed was treated with electrodesiccation after the biopsy was performed.
Electrodesiccation And Curettage Text: The wound bed was treated with electrodesiccation and curettage after the biopsy was performed.
Silver Nitrate Text: The wound bed was treated with silver nitrate after the biopsy was performed.
Lab: [id number]
Lab Facility: 
Consent: Written consent was obtained and risks were reviewed including but not limited to scarring, infection, bleeding, scabbing, incomplete removal, nerve damage and allergy to anesthesia.
Post-Care Instructions: I reviewed with the patient in detail post-care instructions. Patient is to keep the biopsy site dry overnight. Gentle cleansing daily.  Apply petroleum ointment daily until healed. Patient may apply hydrogen peroxide soaks to remove any crusting.
Notification Instructions: Patient will be notified of biopsy results. However, patient instructed to call the office if not contacted within 2 weeks.
Billing Type: Third-Party Bill
Information: Selecting Yes will display possible errors in your note based on the variables you have selected. This validation is only offered as a suggestion for you. PLEASE NOTE THAT THE VALIDATION TEXT WILL BE REMOVED WHEN YOU FINALIZE YOUR NOTE. IF YOU WANT TO FAX A PRELIMINARY NOTE YOU WILL NEED TO TOGGLE THIS TO 'NO' IF YOU DO NOT WANT IT IN YOUR FAXED NOTE.

## 2023-01-19 ENCOUNTER — APPOINTMENT (RX ONLY)
Dept: URBAN - METROPOLITAN AREA CLINIC 22 | Facility: CLINIC | Age: 78
Setting detail: DERMATOLOGY
End: 2023-01-19

## 2023-01-19 DIAGNOSIS — L57.0 ACTINIC KERATOSIS: ICD-10-CM

## 2023-01-19 PROBLEM — D04.4 CARCINOMA IN SITU OF SKIN OF SCALP AND NECK: Status: ACTIVE | Noted: 2023-01-19

## 2023-01-19 PROCEDURE — 17000 DESTRUCT PREMALG LESION: CPT | Mod: 59

## 2023-01-19 PROCEDURE — 17003 DESTRUCT PREMALG LES 2-14: CPT

## 2023-01-19 PROCEDURE — ? CURETTAGE AND DESTRUCTION

## 2023-01-19 PROCEDURE — ? LIQUID NITROGEN

## 2023-01-19 PROCEDURE — 17273 DSTR MAL LES S/N/H/F/G 2.1-3: CPT

## 2023-01-19 ASSESSMENT — LOCATION DETAILED DESCRIPTION DERM
LOCATION DETAILED: LEFT NASAL ALA
LOCATION DETAILED: RIGHT SUPERIOR FOREHEAD
LOCATION DETAILED: RIGHT SUPERIOR MEDIAL FOREHEAD
LOCATION DETAILED: LEFT SUPERIOR CENTRAL MALAR CHEEK

## 2023-01-19 ASSESSMENT — LOCATION ZONE DERM
LOCATION ZONE: FACE
LOCATION ZONE: NOSE

## 2023-01-19 ASSESSMENT — LOCATION SIMPLE DESCRIPTION DERM
LOCATION SIMPLE: LEFT NOSE
LOCATION SIMPLE: RIGHT FOREHEAD
LOCATION SIMPLE: LEFT CHEEK

## 2023-01-19 NOTE — PROCEDURE: CURETTAGE AND DESTRUCTION
Detail Level: Detailed
Accession # (Optional): [id number]
Number Of Curettages: 3
Size Of Lesion In Cm: 2
Size Of Lesion After Curettage: 2.4
Add Intralesional Injection: No
Total Volume (Ccs): 1
Anesthesia Type: 0.05% lidocaine without epinephrine
Anesthesia Volume In Cc: 1.5
Cautery Type: electrodesiccation
What Was Performed First?: Curettage
Final Size Statement: The size of the lesion after curettage was
Additional Information: (Optional): The wound was cleaned, and a pressure dressing was applied.  The patient received detailed post-op instructions.
Consent was obtained from the patient. The risks, benefits and alternatives to therapy were discussed in detail. Specifically, the risks of infection, scarring, bleeding, prolonged wound healing, nerve injury, incomplete removal, allergy to anesthesia and recurrence were addressed. Alternatives to ED&C, such as: surgical removal and XRT were also discussed.  Prior to the procedure, the treatment site was clearly identified and confirmed by the patient. All components of Universal Protocol/PAUSE Rule completed.
Post-Care Instructions: I reviewed with the patient in detail post-care instructions. Patient is to keep the area dry for 48 hours, and not to engage in any swimming until the area is healed. Should the patient develop any fevers, chills, bleeding, severe pain patient will contact the office immediately.
Bill As A Line Item Or As Units: Line Item

## 2023-01-19 NOTE — PROCEDURE: LIQUID NITROGEN
Consent: The patient's consent was obtained including but not limited to risks of crusting, scabbing, blistering, scarring, darker or lighter pigmentary change, recurrence, incomplete removal and infection.
Show Aperture Variable?: Yes
Duration Of Freeze Thaw-Cycle (Seconds): 0
Post-Care Instructions: I reviewed with the patient in detail post-care instructions. Patient is to wear sunprotection, and avoid picking at any of the treated lesions. Pt may apply Vaseline to crusted or scabbing areas.
Number Of Freeze-Thaw Cycles: 1 freeze-thaw cycle
Detail Level: Detailed
Render Post-Care Instructions In Note?: no

## 2023-07-10 ENCOUNTER — APPOINTMENT (RX ONLY)
Dept: URBAN - METROPOLITAN AREA CLINIC 22 | Facility: CLINIC | Age: 78
Setting detail: DERMATOLOGY
End: 2023-07-10

## 2023-07-10 DIAGNOSIS — L57.0 ACTINIC KERATOSIS: ICD-10-CM

## 2023-07-10 DIAGNOSIS — L82.1 OTHER SEBORRHEIC KERATOSIS: ICD-10-CM

## 2023-07-10 DIAGNOSIS — L81.4 OTHER MELANIN HYPERPIGMENTATION: ICD-10-CM

## 2023-07-10 DIAGNOSIS — Z85.828 PERSONAL HISTORY OF OTHER MALIGNANT NEOPLASM OF SKIN: ICD-10-CM

## 2023-07-10 PROCEDURE — ? COUNSELING

## 2023-07-10 PROCEDURE — 99213 OFFICE O/P EST LOW 20 MIN: CPT | Mod: 25

## 2023-07-10 PROCEDURE — 17003 DESTRUCT PREMALG LES 2-14: CPT

## 2023-07-10 PROCEDURE — 17000 DESTRUCT PREMALG LESION: CPT

## 2023-07-10 PROCEDURE — ? LIQUID NITROGEN

## 2023-07-10 ASSESSMENT — LOCATION DETAILED DESCRIPTION DERM
LOCATION DETAILED: LEFT CENTRAL FRONTAL SCALP
LOCATION DETAILED: RIGHT CENTRAL PARIETAL SCALP
LOCATION DETAILED: RIGHT MEDIAL FRONTAL SCALP
LOCATION DETAILED: RIGHT DISTAL DORSAL FOREARM
LOCATION DETAILED: POSTERIOR MID-PARIETAL SCALP
LOCATION DETAILED: LEFT DISTAL DORSAL FOREARM
LOCATION DETAILED: MID-OCCIPITAL SCALP
LOCATION DETAILED: LEFT SUPERIOR PARIETAL SCALP

## 2023-07-10 ASSESSMENT — LOCATION SIMPLE DESCRIPTION DERM
LOCATION SIMPLE: LEFT SCALP
LOCATION SIMPLE: LEFT FOREARM
LOCATION SIMPLE: POSTERIOR SCALP
LOCATION SIMPLE: RIGHT SCALP
LOCATION SIMPLE: SCALP
LOCATION SIMPLE: RIGHT FOREARM

## 2023-07-10 ASSESSMENT — LOCATION ZONE DERM
LOCATION ZONE: ARM
LOCATION ZONE: SCALP

## 2023-07-10 NOTE — PROCEDURE: LIQUID NITROGEN
Post-Care Instructions: I reviewed with the patient in detail post-care instructions. Patient is to wear sunprotection, and avoid picking at any of the treated lesions. Pt may apply Vaseline to crusted or scabbing areas.
Detail Level: Detailed
Number Of Freeze-Thaw Cycles: 2 freeze-thaw cycles
Show Applicator Variable?: Yes
Render Note In Bullet Format When Appropriate: No
Consent: The patient's consent was obtained including but not limited to risks of crusting, scabbing, blistering, scarring, darker or lighter pigmentary change, recurrence, incomplete removal and infection.
Duration Of Freeze Thaw-Cycle (Seconds): 0

## 2024-01-10 ENCOUNTER — APPOINTMENT (RX ONLY)
Dept: URBAN - METROPOLITAN AREA CLINIC 22 | Facility: CLINIC | Age: 79
Setting detail: DERMATOLOGY
End: 2024-01-10

## 2024-01-10 DIAGNOSIS — Z87.2 PERSONAL HISTORY OF DISEASES OF THE SKIN AND SUBCUTANEOUS TISSUE: ICD-10-CM | Status: RESOLVED

## 2024-01-10 DIAGNOSIS — L82.1 OTHER SEBORRHEIC KERATOSIS: ICD-10-CM

## 2024-01-10 DIAGNOSIS — L81.4 OTHER MELANIN HYPERPIGMENTATION: ICD-10-CM

## 2024-01-10 PROCEDURE — ? COUNSELING

## 2024-01-10 PROCEDURE — 99213 OFFICE O/P EST LOW 20 MIN: CPT

## 2024-01-10 ASSESSMENT — LOCATION SIMPLE DESCRIPTION DERM
LOCATION SIMPLE: SCALP
LOCATION SIMPLE: RIGHT FOREHEAD
LOCATION SIMPLE: LEFT SCALP
LOCATION SIMPLE: RIGHT SCALP
LOCATION SIMPLE: POSTERIOR SCALP

## 2024-01-10 ASSESSMENT — LOCATION DETAILED DESCRIPTION DERM
LOCATION DETAILED: RIGHT SUPERIOR MEDIAL FOREHEAD
LOCATION DETAILED: RIGHT CENTRAL FRONTAL SCALP
LOCATION DETAILED: LEFT CENTRAL FRONTAL SCALP
LOCATION DETAILED: RIGHT CENTRAL PARIETAL SCALP
LOCATION DETAILED: MID-OCCIPITAL SCALP
LOCATION DETAILED: POSTERIOR MID-PARIETAL SCALP

## 2024-01-10 ASSESSMENT — LOCATION ZONE DERM
LOCATION ZONE: FACE
LOCATION ZONE: SCALP